# Patient Record
Sex: FEMALE | Race: WHITE | Employment: OTHER | RURAL
[De-identification: names, ages, dates, MRNs, and addresses within clinical notes are randomized per-mention and may not be internally consistent; named-entity substitution may affect disease eponyms.]

---

## 2017-01-01 ENCOUNTER — TELEPHONE (OUTPATIENT)
Dept: FAMILY MEDICINE CLINIC | Age: 72
End: 2017-01-01

## 2017-01-06 ENCOUNTER — OFFICE VISIT (OUTPATIENT)
Dept: FAMILY MEDICINE CLINIC | Age: 72
End: 2017-01-06

## 2017-01-06 VITALS
BODY MASS INDEX: 42.4 KG/M2 | DIASTOLIC BLOOD PRESSURE: 60 MMHG | OXYGEN SATURATION: 95 % | WEIGHT: 202 LBS | RESPIRATION RATE: 20 BRPM | SYSTOLIC BLOOD PRESSURE: 120 MMHG | HEIGHT: 58 IN | TEMPERATURE: 97.9 F | HEART RATE: 56 BPM

## 2017-01-06 DIAGNOSIS — J20.9 ACUTE BRONCHITIS, UNSPECIFIED ORGANISM: ICD-10-CM

## 2017-01-06 DIAGNOSIS — R73.02 GLUCOSE INTOLERANCE (IMPAIRED GLUCOSE TOLERANCE): ICD-10-CM

## 2017-01-06 DIAGNOSIS — J42 CHRONIC BRONCHITIS, UNSPECIFIED CHRONIC BRONCHITIS TYPE (HCC): Primary | ICD-10-CM

## 2017-01-06 DIAGNOSIS — I10 ESSENTIAL HYPERTENSION, BENIGN: ICD-10-CM

## 2017-01-06 DIAGNOSIS — E78.2 MIXED HYPERLIPIDEMIA: ICD-10-CM

## 2017-01-06 DIAGNOSIS — Z79.01 LONG TERM CURRENT USE OF ANTICOAGULANT: ICD-10-CM

## 2017-01-06 DIAGNOSIS — I25.10 ATHEROSCLEROSIS OF NATIVE CORONARY ARTERY OF NATIVE HEART WITHOUT ANGINA PECTORIS: ICD-10-CM

## 2017-01-06 DIAGNOSIS — I48.0 PAROXYSMAL ATRIAL FIBRILLATION (HCC): ICD-10-CM

## 2017-01-06 DIAGNOSIS — M54.50 ACUTE BILATERAL LOW BACK PAIN WITHOUT SCIATICA: ICD-10-CM

## 2017-01-06 RX ORDER — DOXYCYCLINE 100 MG/1
100 TABLET ORAL 2 TIMES DAILY
Qty: 20 TAB | Refills: 0 | Status: SHIPPED | OUTPATIENT
Start: 2017-01-06 | End: 2017-01-16

## 2017-01-06 RX ORDER — TRIAMCINOLONE ACETONIDE 40 MG/ML
40 INJECTION, SUSPENSION INTRA-ARTICULAR; INTRAMUSCULAR ONCE
Qty: 1 ML | Refills: 0
Start: 2017-01-06 | End: 2017-01-06

## 2017-01-06 NOTE — MR AVS SNAPSHOT
Visit Information Date & Time Provider Department Dept. Phone Encounter #  
 1/6/2017 10:30 AM ROLY Carl [facility] [address] [phone] [id number] Your Appointments 2/20/2017 11:00 AM  
ESTABLISHED PATIENT with Luis Manuel Rowland MD  
[address] [place] [facility]) Appt Note: 6 mo fu; 6 mth fu  $0   
 [address] [id number] [phone]  
  
   
 [address] [place] [address] [address] [address]  
  
    
 2/24/2017 10:00 AM  
Nurse Visit with Ahsley ([facility]) Appt Note: Labs for [employer] [address] [place] [address]  
[address] [facility] [address] [address] Upcoming Health Maintenance Date Due Hepatitis C Screening 1945 FOOT EXAM Q1 11/13/1956 DTaP/Tdap/Td series (1 - Tdap) 11/13/1967 FOBT Q 1 YEAR AGE 50-75 11/13/1996 EYE EXAM RETINAL OR DILATED Q1 12/9/2014 BREAST CANCER SCRN MAMMOGRAM 7/15/2015 GLAUCOMA SCREENING Q2Y 12/9/2015 MEDICARE YEARLY EXAM 12/16/2016 HEMOGLOBIN A1C Q6M 1/28/2017 LIPID PANEL Q1 7/22/2017 MICROALBUMIN Q1 9/13/2017 Allergies as of 1/6/2017  Review Complete On: 1/6/2017 By: ROLY Carl Severity Noted Reaction Type Reactions Tramadol Medium 07/29/2013    Hives, Rash Pcn [Penicillins]  10/05/2015    Rash, Nausea and Vomiting Shellfish Containing Products  08/17/2012    Shortness of Breath Statins-hmg-coa Reductase Inhibitors  01/27/2015   Side Effect Myalgia Current Immunizations  Reviewed on 4/15/2016 Name Date Influenza High Dose Vaccine PF 9/26/2016, 11/17/2015 Influenza Vaccine 11/8/2013  1:28 PM  
 Pneumococcal Conjugate (PCV-13) 12/20/2015 Pneumococcal Polysaccharide (PPSV-23) 11/8/2013  1:30 PM  
 Zoster Vaccine, Live 1/1/2010 Not reviewed this visit You Were Diagnosed With   
  
 Codes Comments Chronic bronchitis, unspecified chronic bronchitis type ([facility]    -  Primary ICD-10-CM: Y82 ICD-9-CM: 491.9 Long term current use of anticoagulant     ICD-10-CM: Z79.01 
ICD-9-CM: V58.61 Paroxysmal atrial fibrillation (HCC)     ICD-10-CM: I48.0 ICD-9-CM: 427.31 Atherosclerosis of native coronary artery of native heart without angina pectoris     ICD-10-CM: I25.10 ICD-9-CM: 414.01 Essential hypertension, benign     ICD-10-CM: I10 
ICD-9-CM: 401.1 Mixed hyperlipidemia     ICD-10-CM: E78.2 ICD-9-CM: 272.2 Glucose intolerance (impaired glucose tolerance)     ICD-10-CM: R73.02 
ICD-9-CM: 790.22 Acute bronchitis, unspecified organism     ICD-10-CM: J20.9 ICD-9-CM: 466.0 Acute bilateral low back pain without sciatica     ICD-10-CM: M54.5 ICD-9-CM: 724.2, 338.19 Vitals BP Pulse Temp Resp Height(growth percentile) Weight(growth percentile) 120/60 (BP 1 Location: Left arm, BP Patient Position: Sitting) (!) 56 97.9 °F (36.6 °C) (Oral) 20 4' 10.25\" (1.48 m) 202 lb (91.6 kg) SpO2 BMI OB Status Smoking Status 95% 41.86 kg/m2 Hysterectomy Former Smoker Vitals History BMI and BSA Data Body Mass Index Body Surface Area  
 41.86 kg/m 2 1.94 m 2 Preferred Pharmacy Pharmacy Name [facility] [address] [phone] Your Updated Medication List  
  
   
This list is accurate as of: 1/6/17 11:48 AM.  Always use your most recent med list.  
  
  
  
  
 arformoterol 15 mcg/2 mL Nebu neb solution Commonly known as:  Krishna Crease 15 mcg by Nebulization route two (2) times a day. aspirin 81 mg chewable tablet Take 81 mg by mouth daily. 4 Tablets Daily. atorvastatin 20 mg tablet Commonly known as:  LIPITOR  
TAKE 1 TABLET EVERY DAY Blood-Glucose Meter monitoring kit Commonly known as:  FREESTYLE LITE METER Dx E 11.5  
  
 budesonide 0.25 mg/2 mL Nbsp Commonly known as:  PULMICORT  
by Nebulization route two (2) times a day. bumetanide 1 mg tablet Commonly known as:  [place] TAKE 1 TABLET TWICE DAILY AND THEN MAY TAKE ADDITIONAL DOSE AS NEEDED FOR EDEMA  
  
 cholecalciferol 1,000 unit tablet Commonly known as:  VITAMIN D3 Take 1 Tab by mouth daily. COQ10  PO Take 100 mg by mouth daily. dilTIAZem  mg ER capsule Commonly known as:  CARDIZEM CD Take 1 Cap by mouth daily. doxycycline 100 mg tablet Commonly known as:  ADOXA Take 1 Tab by mouth two (2) times a day for 10 days. esomeprazole 20 mg capsule Commonly known as:  NexIUM Take 1 Cap by mouth daily. Indications: GASTROESOPHAGEAL REFLUX  
  
 famotidine 40 mg tablet Commonly known as:  PEPCID Take 1 Tab by mouth daily. Indications: GASTROESOPHAGEAL REFLUX  
  
 ferrous sulfate 325 mg (65 mg iron) tablet Take 1 Tab by mouth Daily (before breakfast). Indications: IRON DEFICIENCY ANEMIA  
  
 fluticasone-vilanterol 100-25 mcg/dose inhaler Commonly known as:  BREO ELLIPTA Take 1 Puff by inhalation daily. Indications: BRONCHOSPASM PREVENTION WITH COPD  
  
 glucose blood VI test strips strip Commonly known as:  ASCENSIA AUTODISC VI, ONE TOUCH ULTRA TEST VI Test 1 x per day * ipratropium-albuterol  mcg/actuation inhaler Commonly known as:  COMBIVENT One actuation QID * albuterol-ipratropium 2.5 mg-0.5 mg/3 ml Nebu Commonly known as:  DUO-NEB  
3 mL by Nebulization route every six (6) hours as needed. isosorbide mononitrate ER 30 mg tablet Commonly known as:  IMDUR  
TAKE 1 TABLET EVERY DAY  
  
 metoprolol succinate 100 mg tablet Commonly known as:  TOPROL-XL Take  by mouth daily. NITROSTAT 0.4 mg SL tablet Generic drug:  nitroglycerin 0.4 mg by SubLINGual route every five (5) minutes as needed. nystatin powder Commonly known as:  MYCOSTATIN Apply  to affected area three (3) times daily. OT Ultra/FastTrack Control Soln Generic drug:  Blood Glucose Control, Normal  
Dx E11.5 OXYGEN-AIR DELIVERY SYSTEMS  
2 L by Does Not Apply route. predniSONE 10 mg tablet Commonly known as:  DELTASONE  
3 tabs bid for 2 days, then 2 tabs bid for 4 days, then 1 tab bid for 4 days  
  
 theophylline ER(12 hour) 450 mg tablet Commonly known as:  Jenise Lackey Take 1 Tab by mouth daily. You need blood work for this medication * triamcinolone acetonide 0.1 % ointment Commonly known as:  KENALOG Apply  to affected area two (2) times a day. use thin layer * triamcinolone acetonide 40 mg/mL injection Commonly known as:  KENALOG  
1 mL by IntraMUSCular route once for 1 dose. warfarin 5 mg tablet Commonly known as:  COUMADIN Take 1.5 Tabs by mouth daily. TAKE ONE TABLET BY MOUTH ONCE DAILY TO  PREVENT  THROMBOEMBOLISM  WITH  CHRONIC  ATRIAL  FIBRILLATION  
  
 * Notice: This list has 4 medication(s) that are the same as other medications prescribed for you. Read the directions carefully, and ask your doctor or other care provider to review them with you. Prescriptions Sent to Pharmacy Refills  
 doxycycline (ADOXA) 100 mg tablet 0 Sig: Take 1 Tab by mouth two (2) times a day for 10 days. Class: Normal  
 Pharmacy: [facility] [address] [address] [address] Ph #: [phone] Route: Oral  
  
We Performed the Following TRIAMCINOLONE ACETONIDE INJ [ [facility]] To-Do List   
 02/27/2017 Lab:  CBC WITH AUTOMATED DIFF   
  
 02/27/2017 Lab:  HEMOGLOBIN A1C WITH EAG   
  
 02/27/2017 Lab:  LIPID PANEL WITH LDL/HDL RATIO   
  
 02/27/2017 Lab:  METABOLIC PANEL, COMPREHENSIVE Introducing hospitals & HEALTH SERVICES!    
 [facility] [employer] introduces User[employer]jo patient portal. Now you can access parts of your medical record, email your doctor's office, and request medication refills online. 1. In your internet browser, go to [email or web address] 2. Click on the First Time User? Click Here link in the Sign In box. You will see the New Member Sign Up page. 3. Enter your [employer] Access Code exactly as it appears below. You will not need to use this code after youve completed the sign-up process. If you do not sign up before the expiration date, you must request a new code. · [employer] Access Code: [email or web address] Expires: 4/6/2017 11:48 AM 
 
4. Enter the last four digits of your Social Security Number (xxxx) and Date of Birth (mm/dd/yyyy) as indicated and click Submit. You will be taken to the next sign-up page. 5. Create a [employer] ID. This will be your [employer] login ID and cannot be changed, so think of one that is secure and easy to remember. 6. Create a [employer] password. You can change your password at any time. 7. Enter your Password Reset Question and Answer. This can be used at a later time if you forget your password. 8. Enter your e-mail address. You will receive e-mail notification when new information is available in [address] 9. Click Sign Up. You can now view and download portions of your medical record. 10. Click the Download Summary menu link to download a portable copy of your medical information. If you have questions, please visit the Frequently Asked Questions section of the [employer] website. Remember, [employer] is NOT to be used for urgent needs. For medical emergencies, dial 911. Now available from your iPhone and Android! Please provide this summary of care documentation to your next provider. Your primary care clinician is listed as Mony Slade. If you have any questions after today's visit, please call [phone].

## 2017-01-06 NOTE — PROGRESS NOTES
[facility] [address]  [place], [address]   [phone]       HPI  Ms. Fabiola Plasencia is a 80 y.o. female presents today in routine follow up. Left hand- bruised- 2 weeks ago hit left hand-     Recent lab work obtained 11/2016 per Dr. Tashi Yousif- scanned into lab section. Back pain- acute on chronic pain over the past week. Pain bilateral, radiating to buttocks. Patient Active Problem List   Diagnosis Code    COPD (chronic obstructive pulmonary disease) ([facility] J44.9    GERD (gastroesophageal reflux disease) K21.9    Essential hypertension, benign I10    Hyperlipidemia E78.5    Gout M10.9    Osteoporosis M81.0    Anemia D64.9    Morbid obesity with BMI of 45.0-49.9, adult ([facility] E66.01, Z68.42    Coronary atherosclerosis of native coronary artery I25.10    Heart failure (HCC) I50.9    Paroxysmal atrial fibrillation (HCC) I48.0    Long term current use of anticoagulant Z79.01    Bronchitis J40    Pruritic dermatitis L29.9    CYRIL on CPAP G47.33    Dry skin dermatitis L85.3      Allergies   Allergen Reactions    Tramadol Hives and Rash    Pcn [Penicillins] Rash and Nausea and Vomiting    Shellfish Containing Products Shortness of Breath    Statins-Hmg-Coa Reductase Inhibitors Myalgia       Current Outpatient Prescriptions on File Prior to Visit   Medication Sig Dispense Refill    nystatin (MYCOSTATIN) powder Apply  to affected area three (3) times daily. 60 g 3    bumetanide (BUMEX) 1 mg tablet TAKE 1 TABLET TWICE DAILY AND THEN MAY TAKE ADDITIONAL DOSE AS NEEDED FOR EDEMA 200 Tab 1    atorvastatin (LIPITOR) 20 mg tablet TAKE 1 TABLET EVERY DAY 90 Tab 2    esomeprazole (NEXIUM) 20 mg capsule Take 1 Cap by mouth daily. Indications: GASTROESOPHAGEAL REFLUX 90 Cap 1    theophylline ER,12 hour, (THEOCHRON) 450 mg tablet Take 1 Tab by mouth daily.  You need blood work for this medication 90 Tab 1    metoprolol succinate (TOPROL-XL) 100 mg tablet Take  by mouth daily.  aspirin 81 mg chewable tablet Take 81 mg by mouth daily. 4 Tablets Daily.  diltiazem CD (CARDIZEM CD) 120 mg ER capsule Take 1 Cap by mouth daily. (Patient taking differently: Take 120 mg by mouth two (2) times a day. CARTIA XT) 90 Cap 1    warfarin (COUMADIN) 5 mg tablet Take 1.5 Tabs by mouth daily. TAKE ONE TABLET BY MOUTH ONCE DAILY TO  PREVENT  THROMBOEMBOLISM  WITH  CHRONIC  ATRIAL  FIBRILLATION 135 Tab 1    UBIDECARENONE/VITAMIN E MIXED (COQ10  PO) Take 100 mg by mouth daily.  arformoterol (BROVANA) 15 mcg/2 mL nebu neb solution 15 mcg by Nebulization route two (2) times a day.  budesonide (PULMICORT) 0.25 mg/2 mL nbsp by Nebulization route two (2) times a day.  isosorbide mononitrate ER (IMDUR) 30 mg tablet TAKE 1 TABLET EVERY DAY 90 Tab 3    ferrous sulfate 325 mg (65 mg iron) tablet Take 1 Tab by mouth Daily (before breakfast). Indications: IRON DEFICIENCY ANEMIA 90 Tab 1    famotidine (PEPCID) 40 mg tablet Take 1 Tab by mouth daily. Indications: GASTROESOPHAGEAL REFLUX 90 Tab 3    triamcinolone acetonide (KENALOG) 0.1 % ointment Apply  to affected area two (2) times a day. use thin layer 30 g 3    albuterol-ipratropium (DUO-NEB) 2.5 mg-0.5 mg/3 ml nebu 3 mL by Nebulization route every six (6) hours as needed. 90 mL 2    fluticasone-vilanterol (BREO ELLIPTA) 100-25 mcg/dose inhaler Take 1 Puff by inhalation daily. Indications: BRONCHOSPASM PREVENTION WITH COPD 3 Inhaler 1    Blood-Glucose Meter (FREESTYLE LITE METER) monitoring kit Dx E 11.5 1 Kit 0    glucose blood VI test strips (ASCENSIA AUTODISC VI, ONE TOUCH ULTRA TEST VI) strip Test 1 x per day 100 Strip 3    OT ULTRA/FASTTRACK CONTROL soln Dx E11.5 2 Bottle 4    OXYGEN-AIR DELIVERY SYSTEMS 2 L by Does Not Apply route.       ipratropium-albuterol (COMBIVENT)  mcg/actuation inhaler One actuation QID 3 Inhaler 3    cholecalciferol (VITAMIN D3) 1,000 unit tablet Take 1 Tab by mouth daily. (Patient taking differently: Take 2,000 Units by mouth daily. Indications: VITAMIN D DEFICIENCY) 30 Tab 7    nitroglycerin (NITROSTAT) 0.4 mg SL tablet 0.4 mg by SubLINGual route every five (5) minutes as needed.  predniSONE (DELTASONE) 10 mg tablet 3 tabs bid for 2 days, then 2 tabs bid for 4 days, then 1 tab bid for 4 days 36 Tab 0     No current facility-administered medications on file prior to visit. Lab Results   Component Value Date/Time    Hemoglobin A1c 5.7 07/22/2016 09:42 AM    Hemoglobin A1c 6.1 04/15/2016 01:44 PM    Hemoglobin A1c 8.0 01/20/2016 09:41 AM    Glucose 88 09/13/2016 02:35 PM    Microalb/Creat ratio (ug/mg creat.) 1176.1 09/13/2016 02:35 PM    LDL, calculated 88 07/22/2016 09:42 AM    Creatinine 1.38 09/13/2016 02:35 PM    Hemoglobin A1c, External 5.8 07/28/2016           Physical Exam   Cardiovascular: Normal heart sounds. Pulmonary/Chest: She has decreased breath sounds in the right lower field and the left lower field. She has wheezes in the left middle field. She has no rhonchi. She has no rales. Abdominal: Soft. There is no tenderness. Visit Vitals    /60 (BP 1 Location: Left arm, BP Patient Position: Sitting)    Pulse (!) 56    Temp 97.9 °F (36.6 °C) (Oral)    Resp 20    Ht 4' 10.25\" (1.48 m)    Wt 202 lb (91.6 kg)    SpO2 95%    BMI 41.86 kg/m2         Pérez Pang was seen today for follow-up. Diagnoses and all orders for this visit:    Chronic bronchitis, unspecified chronic bronchitis type ([address]    Long term current use of anticoagulant  -     CBC WITH AUTOMATED DIFF; Future    Paroxysmal atrial fibrillation (HCC)    Atherosclerosis of native coronary artery of native heart without angina pectoris    Essential hypertension, benign  -     METABOLIC PANEL, COMPREHENSIVE;  Future    Mixed hyperlipidemia  -     LIPID PANEL WITH LDL/HDL RATIO; Future    Glucose intolerance (impaired glucose tolerance)  -     HEMOGLOBIN A1C WITH EAG; Future    Acute bronchitis, unspecified organism  -     doxycycline (ADOXA) 100 mg tablet; Take 1 Tab by mouth two (2) times a day for 10 days. Acute bilateral low back pain without sciatica  -     TRIAMCINOLONE ACETONIDE INJ  -     triamcinolone acetonide (KENALOG) 40 mg/mL injection; 1 mL by IntraMUSCular route once for 1 dose. Plan     Continue with current regimen     Follow-up Disposition:  Return in about 2 months (around 3/6/2017) for for labs only.       Yumiko Mcadams PA-C, P

## 2017-01-10 RX ORDER — DILTIAZEM HYDROCHLORIDE 120 MG/1
CAPSULE, EXTENDED RELEASE ORAL
Qty: 90 CAP | Refills: 1 | Status: SHIPPED | OUTPATIENT
Start: 2017-01-10 | End: 2017-05-30 | Stop reason: SDUPTHER

## 2017-01-12 ENCOUNTER — TELEPHONE ANTICOAG (OUTPATIENT)
Dept: FAMILY MEDICINE CLINIC | Age: 72
End: 2017-01-12

## 2017-01-12 LAB — INR, EXTERNAL: 3.1

## 2017-01-19 ENCOUNTER — TELEPHONE ANTICOAG (OUTPATIENT)
Dept: FAMILY MEDICINE CLINIC | Age: 72
End: 2017-01-19

## 2017-01-19 ENCOUNTER — TELEPHONE (OUTPATIENT)
Dept: FAMILY MEDICINE CLINIC | Age: 72
End: 2017-01-19

## 2017-01-19 DIAGNOSIS — Z79.01 LONG TERM CURRENT USE OF ANTICOAGULANT: ICD-10-CM

## 2017-01-19 LAB — INR, EXTERNAL: 5

## 2017-01-19 RX ORDER — WARFARIN SODIUM 5 MG/1
TABLET ORAL
Qty: 135 TAB | Refills: 1
Start: 2017-01-19 | End: 2017-05-10 | Stop reason: SDUPTHER

## 2017-01-19 NOTE — PROGRESS NOTES
Hold Coumadin for 2 days presently we have her at 7.5 mg each day if that is the case reduce to 5 mg Monday Wednesday Friday and 7.5 mg rest of the week

## 2017-01-27 ENCOUNTER — TELEPHONE ANTICOAG (OUTPATIENT)
Dept: FAMILY MEDICINE CLINIC | Age: 72
End: 2017-01-27

## 2017-01-27 LAB — INR, EXTERNAL: 2.2

## 2017-01-27 NOTE — PROGRESS NOTES
Spoke with patient has been taking 1 & 1/2 tablets since last week so I advised her to stay on that. TAYO :  Please call patient wants to talk with you about stopping coumadin and starting something else.

## 2017-02-02 RX ORDER — THEOPHYLLINE 450 MG/1
TABLET, EXTENDED RELEASE ORAL
Qty: 90 TAB | Refills: 1 | Status: SHIPPED | OUTPATIENT
Start: 2017-02-02 | End: 2017-07-18 | Stop reason: SDUPTHER

## 2017-02-07 DIAGNOSIS — I50.9 CHRONIC CONGESTIVE HEART FAILURE, UNSPECIFIED CONGESTIVE HEART FAILURE TYPE: ICD-10-CM

## 2017-02-10 ENCOUNTER — TELEPHONE (OUTPATIENT)
Dept: FAMILY MEDICINE CLINIC | Age: 72
End: 2017-02-10

## 2017-02-10 RX ORDER — BUMETANIDE 1 MG/1
TABLET ORAL
Qty: 270 TAB | Refills: 1 | Status: SHIPPED | OUTPATIENT
Start: 2017-02-10 | End: 2017-06-09 | Stop reason: SDUPTHER

## 2017-02-13 DIAGNOSIS — K21.9 GASTROESOPHAGEAL REFLUX DISEASE WITHOUT ESOPHAGITIS: ICD-10-CM

## 2017-02-16 ENCOUNTER — TELEPHONE (OUTPATIENT)
Dept: FAMILY MEDICINE CLINIC | Age: 72
End: 2017-02-16

## 2017-02-16 ENCOUNTER — TELEPHONE ANTICOAG (OUTPATIENT)
Dept: FAMILY MEDICINE CLINIC | Age: 72
End: 2017-02-16

## 2017-02-16 ENCOUNTER — DOCUMENTATION ONLY (OUTPATIENT)
Dept: FAMILY MEDICINE CLINIC | Age: 72
End: 2017-02-16

## 2017-02-16 LAB — INR, EXTERNAL: 4.2

## 2017-02-16 NOTE — TELEPHONE ENCOUNTER
Rogerio García called with abnormal  INR results 4.2.   Prosper Andino she will route results back for Dr. Navarro Read review.

## 2017-02-16 NOTE — PROGRESS NOTES
Patient unknown to me. INR 4.2 taken at home and placed in my inbox. I contacted patient and found that she has been avoiding greens. Has been taking warfarin 7.5 mg daily for 6 months or longer. Reviewed INR history and discovered that this is the third time she has had an INR>4 in 6 weeks. Advised patient on phone today to hold warfarin tonight. Will resume warfarin 7.5 mg 6 days a week and to skip sundays and to have INR checked again in a week. Invited patient to make appt with me in March.       Zaki Major

## 2017-02-17 RX ORDER — FAMOTIDINE 40 MG/1
TABLET, FILM COATED ORAL
Qty: 90 TAB | Refills: 3 | Status: SHIPPED | OUTPATIENT
Start: 2017-02-17 | End: 2017-11-14 | Stop reason: SDUPTHER

## 2017-02-20 ENCOUNTER — OFFICE VISIT (OUTPATIENT)
Dept: CARDIOLOGY CLINIC | Age: 72
End: 2017-02-20

## 2017-02-20 VITALS
SYSTOLIC BLOOD PRESSURE: 124 MMHG | BODY MASS INDEX: 42.82 KG/M2 | HEIGHT: 58 IN | HEART RATE: 76 BPM | WEIGHT: 204 LBS | RESPIRATION RATE: 18 BRPM | OXYGEN SATURATION: 96 % | DIASTOLIC BLOOD PRESSURE: 76 MMHG

## 2017-02-20 DIAGNOSIS — G47.33 OSA ON CPAP: ICD-10-CM

## 2017-02-20 DIAGNOSIS — I48.0 PAROXYSMAL ATRIAL FIBRILLATION (HCC): Primary | ICD-10-CM

## 2017-02-20 DIAGNOSIS — I10 ESSENTIAL HYPERTENSION, BENIGN: ICD-10-CM

## 2017-02-20 DIAGNOSIS — Z99.89 OSA ON CPAP: ICD-10-CM

## 2017-02-20 DIAGNOSIS — E11.9 CONTROLLED TYPE 2 DIABETES MELLITUS WITHOUT COMPLICATION, UNSPECIFIED LONG TERM INSULIN USE STATUS: ICD-10-CM

## 2017-02-20 DIAGNOSIS — K21.9 GASTROESOPHAGEAL REFLUX DISEASE WITHOUT ESOPHAGITIS: ICD-10-CM

## 2017-02-20 DIAGNOSIS — J42 CHRONIC BRONCHITIS, UNSPECIFIED CHRONIC BRONCHITIS TYPE (HCC): ICD-10-CM

## 2017-02-20 DIAGNOSIS — E66.01 MORBID OBESITY WITH BMI OF 45.0-49.9, ADULT (HCC): ICD-10-CM

## 2017-02-20 DIAGNOSIS — I50.32 CHRONIC DIASTOLIC HEART FAILURE (HCC): ICD-10-CM

## 2017-02-20 DIAGNOSIS — I25.10 ATHEROSCLEROSIS OF NATIVE CORONARY ARTERY OF NATIVE HEART WITHOUT ANGINA PECTORIS: ICD-10-CM

## 2017-02-20 NOTE — PROGRESS NOTES
Jeannette Sorensen is a 80 y.o. female is here for routine f/u. Stable PHAM, occ palpitations. Some issues with the warfarin--may discuss NOAC options with PCP. The patient denies chest pain, orthopnea, PND, LE edema,  syncope, presyncope or fatigue.        Patient Active Problem List    Diagnosis Date Noted    Dry skin dermatitis 07/22/2016    CYRIL on CPAP 05/03/2016    Pruritic dermatitis 04/15/2016    Bronchitis 10/01/2015    Long term current use of anticoagulant 08/25/2015    Paroxysmal atrial fibrillation ([facility] 06/22/2015    Heart failure ([facility]     Coronary atherosclerosis of native coronary artery     Morbid obesity with BMI of 45.0-49.9, adult ([facility] 01/23/2015    GERD (gastroesophageal reflux disease)     Essential hypertension, benign     Hyperlipidemia     Gout     Osteoporosis     Anemia     COPD (chronic obstructive pulmonary disease) ([facility] 08/18/2012      ROLY Ballesteros  Past Medical History   Diagnosis Date    Adjustment disorder with depressed mood 2009    Allergic rhinitis due to other allergen 2011    Anemia, unspecified 2009    COPD     Coronary atherosclerosis of native coronary artery     Essential hypertension, benign 2008    GERD (gastroesophageal reflux disease)     Gout, unspecified 2008    Heart failure ([address]      hx of, asymptomic now    Impaired glucose tolerance test 2012    Morbid obesity with BMI of 45.0-49.9, adult ([facility] 1/23/2015    Osteoporosis, unspecified 2009    Other and unspecified hyperlipidemia 2008    Unspecified hereditary and idiopathic peripheral neuropathy 2010    Unspecified vitamin D deficiency 2009      Past Surgical History   Procedure Laterality Date    Hx appendectomy      Hx nohelia and bso      Hx colonoscopy      Hx ptca  2012     Unsuccessful attempt to opne RCA    Hx heart catheterization  2000    Hx heart catheterization  2012     LVEWDP 11, PCWP 11, Inf Hypo EF 45%, LAD 30%, mRCA 100% R to R and L to R collaterals    Hx coronary stent placement  2000     LCx     Allergies   Allergen Reactions    Tramadol Hives and Rash    Pcn [Penicillins] Rash and Nausea and Vomiting    Shellfish Containing Products Shortness of Breath    Statins-Hmg-Coa Reductase Inhibitors Myalgia      Family History   Problem Relation Age of Onset    Arthritis-osteo Mother     Diabetes Mother     Heart Disease Mother     Lung Disease Mother     Hypertension Sister     Asthma Sister     Asthma Brother     Heart Disease Maternal Grandmother     No Known Problems Brother     No Known Problems Sister     Kidney Disease Daughter     Lupus Daughter       Social History     Social History    Marital status:      Spouse name: N/A    Number of children: N/A    Years of education: N/A     Occupational History    Not on file. Social History Main Topics    Smoking status: Former Smoker     Packs/day: 1.00     Years: 40.00     Quit date: 12/1/2013    Smokeless tobacco: Not on file    Alcohol use No    Drug use: No    Sexual activity: Not on file     Other Topics Concern    Not on file     Social History Narrative      Current Outpatient Prescriptions   Medication Sig    famotidine (PEPCID) 40 mg tablet TAKE 1 TAB BY MOUTH DAILY FOR GASTROESOPHAGEAL REFLUX    bumetanide (BUMEX) 1 mg tablet TAKE 1 TABLET TWICE DAILY AND THEN MAY TAKE ADDITIONAL DOSE AS NEEDED FOR EDEMA    theophylline ER,12 hour, (THEOCHRON) 450 mg tablet TAKE 1 TALETB BY MOUTH DAILY. YOU NEED BLOOD WORK FOR THIS MEDICATION    warfarin (COUMADIN) 5 mg tablet 5 mg Monday Wednesday Friday 7.5 mg rest the week    CARTIA  mg ER capsule TAKE 1 CAPSULE EVERY DAY    nystatin (MYCOSTATIN) powder Apply  to affected area three (3) times daily.  atorvastatin (LIPITOR) 20 mg tablet TAKE 1 TABLET EVERY DAY    esomeprazole (NEXIUM) 20 mg capsule Take 1 Cap by mouth daily.  Indications: GASTROESOPHAGEAL REFLUX    metoprolol succinate (TOPROL-XL) 100 mg tablet Take  by mouth daily.  aspirin 81 mg chewable tablet Take 81 mg by mouth daily. 4 Tablets Daily.  UBIDECARENONE/VITAMIN E MIXED (COQ10  PO) Take 100 mg by mouth daily.  arformoterol (BROVANA) 15 mcg/2 mL nebu neb solution 15 mcg by Nebulization route two (2) times a day.  budesonide (PULMICORT) 0.25 mg/2 mL nbsp by Nebulization route two (2) times a day.  isosorbide mononitrate ER (IMDUR) 30 mg tablet TAKE 1 TABLET EVERY DAY    ferrous sulfate 325 mg (65 mg iron) tablet Take 1 Tab by mouth Daily (before breakfast). Indications: IRON DEFICIENCY ANEMIA    triamcinolone acetonide (KENALOG) 0.1 % ointment Apply  to affected area two (2) times a day. use thin layer    albuterol-ipratropium (DUO-NEB) 2.5 mg-0.5 mg/3 ml nebu 3 mL by Nebulization route every six (6) hours as needed.  fluticasone-vilanterol (BREO ELLIPTA) 100-25 mcg/dose inhaler Take 1 Puff by inhalation daily. Indications: BRONCHOSPASM PREVENTION WITH COPD    Blood-Glucose Meter (FREESTYLE LITE METER) monitoring kit Dx E 11.5    glucose blood VI test strips (ASCENSIA AUTODISC VI, ONE TOUCH ULTRA TEST VI) strip Test 1 x per day    OT ULTRA/FASTTRACK CONTROL soln Dx E11.5    OXYGEN-AIR DELIVERY SYSTEMS 2 L by Does Not Apply route.  ipratropium-albuterol (COMBIVENT)  mcg/actuation inhaler One actuation QID    cholecalciferol (VITAMIN D3) 1,000 unit tablet Take 1 Tab by mouth daily. (Patient taking differently: Take 2,000 Units by mouth daily. Indications: VITAMIN D DEFICIENCY)    nitroglycerin (NITROSTAT) 0.4 mg SL tablet 0.4 mg by SubLINGual route every five (5) minutes as needed. No current facility-administered medications for this visit. Review of Symptoms:    CONST  No weight change. No fever, chills, sweats    ENT No visual changes, URI sx, sore throat    CV  See HPI   RESP  No cough, or sputum, wheezing. Also see HPI   GI  No abdominal pain or change in bowel habits.   No heartburn or dysphagia. No melena or rectal bleeding.   No dysuria, urgency, frequency, hematuria   MSKEL  No joint pain, swelling. No muscle pain. SKIN  No rash or lesions. NEURO  No headache, syncope, or seizure. No weakness, loss of sensation, or paresthesias. PSYCH  No low mood or depression  No anxiety. HE/LYMPH  No easy bruising, abnormal bleeding, or enlarged glands.         Physical ExamPhysical Exam:    Visit Vitals    /76 (BP 1 Location: Left arm, BP Patient Position: Sitting)    Pulse 76    Resp 18    Ht 4' 10.25\" (1.48 m)    Wt 204 lb (92.5 kg)    SpO2 96%    BMI 42.27 kg/m2     Gen: NAD  HEENT:  PERRL, throat clear  Neck: no mass or thyromegaly, no JVD   Heart:  sl irregular,Nl S1S2,  no murmur, gallop or rub.   Lungs:  clear  Abdomen:   Soft, non-tender, bowel sounds are active.   Extremities:  No edema  Pulse: symmetric  Neuro: A&O times 3, WNL      Cardiographics    ECG: NSR rate variation/PAC's, NST    CARDIAC TESTING    Lexiscan MPI, 10/2014: Normal perfusion, EF 68%    Echo 7/15/15--normal LV size, LVEF 60-65, mild AS (32.13/16.89), mod LAE, MAC, trace MR, inadequate TR for PASP    Echo 7/27/16--mild LVH, LVEF 60-65, D1, mod LAE, mild AS (p18/m10.8), mod MAC, mild MR, mild MS, RVSP 46.      Labs:   Lab Results   Component Value Date/Time    Sodium 144 09/13/2016 02:35 PM    Sodium 142 08/02/2016 01:50 PM    Sodium 140 07/22/2016 09:42 AM    Sodium 144 04/15/2016 01:44 PM    Sodium 142 01/20/2016 09:41 AM    Potassium 5.2 09/13/2016 02:35 PM    Potassium 5.1 08/02/2016 01:50 PM    Potassium 5.7 07/22/2016 09:42 AM    Potassium 4.3 04/15/2016 01:44 PM    Potassium 3.9 01/20/2016 09:41 AM    Chloride 102 09/13/2016 02:35 PM    Chloride 101 08/02/2016 01:50 PM    Chloride 101 07/22/2016 09:42 AM    Chloride 99 04/15/2016 01:44 PM    Chloride 98 01/20/2016 09:41 AM    CO2 25 09/13/2016 02:35 PM    CO2 25 08/02/2016 01:50 PM    CO2 20 07/22/2016 09:42 AM    CO2 28 04/15/2016 01:44 PM    CO2 29 01/20/2016 09:41 AM    Anion gap 7 08/18/2012 04:00 AM    Glucose 88 09/13/2016 02:35 PM    Glucose 101 08/02/2016 01:50 PM    Glucose 130 07/22/2016 09:42 AM    Glucose 153 04/15/2016 01:44 PM    Glucose 156 01/20/2016 09:41 AM    BUN 52 09/13/2016 02:35 PM    BUN 43 08/02/2016 01:50 PM    BUN 59 07/22/2016 09:42 AM    BUN 22 04/15/2016 01:44 PM    BUN 31 01/20/2016 09:41 AM    Creatinine 1.38 09/13/2016 02:35 PM    Creatinine 1.19 08/02/2016 01:50 PM    Creatinine 1.58 07/22/2016 09:42 AM    Creatinine 1.34 04/15/2016 01:44 PM    Creatinine 1.11 01/20/2016 09:41 AM    BUN/Creatinine ratio 38 09/13/2016 02:35 PM    BUN/Creatinine ratio 36 08/02/2016 01:50 PM    BUN/Creatinine ratio 37 07/22/2016 09:42 AM    BUN/Creatinine ratio 16 04/15/2016 01:44 PM    BUN/Creatinine ratio 28 01/20/2016 09:41 AM    GFR est AA 45 09/13/2016 02:35 PM    GFR est AA 54 08/02/2016 01:50 PM    GFR est AA 38 07/22/2016 09:42 AM    GFR est AA 47 04/15/2016 01:44 PM    GFR est AA 59 01/20/2016 09:41 AM    GFR est non-AA 39 09/13/2016 02:35 PM    GFR est non-AA 47 08/02/2016 01:50 PM    GFR est non-AA 33 07/22/2016 09:42 AM    GFR est non-AA 40 04/15/2016 01:44 PM    GFR est non-AA 51 01/20/2016 09:41 AM    Calcium 10.2 09/13/2016 02:35 PM    Calcium 9.4 08/02/2016 01:50 PM    Calcium 9.5 07/22/2016 09:42 AM    Calcium 10.0 04/15/2016 01:44 PM    Calcium 9.3 01/20/2016 09:41 AM    Bilirubin, total <0.2 08/02/2016 01:50 PM    Bilirubin, total <0.2 07/22/2016 09:42 AM    Bilirubin, total 0.2 04/15/2016 01:44 PM    Bilirubin, total 0.3 01/20/2016 09:41 AM    Bilirubin, total 0.4 12/16/2015 10:17 AM    AST (SGOT) 10 08/02/2016 01:50 PM    AST (SGOT) 13 07/22/2016 09:42 AM    AST (SGOT) 9 04/15/2016 01:44 PM    AST (SGOT) 10 01/20/2016 09:41 AM    AST (SGOT) 9 12/16/2015 10:17 AM    Alk. phosphatase 117 08/02/2016 01:50 PM    Alk. phosphatase 121 07/22/2016 09:42 AM    Alk. phosphatase 114 04/15/2016 01:44 PM    Alk.  phosphatase 111 01/20/2016 09:41 AM    Alk. phosphatase 122 12/16/2015 10:17 AM    Protein, total 6.3 08/02/2016 01:50 PM    Protein, total 7.0 07/22/2016 09:42 AM    Protein, total 6.5 04/15/2016 01:44 PM    Protein, total 6.1 01/20/2016 09:41 AM    Protein, total 6.4 12/16/2015 10:17 AM    Albumin 4.2 09/13/2016 02:35 PM    Albumin 3.9 08/02/2016 01:50 PM    Albumin 4.1 07/22/2016 09:42 AM    Albumin 3.8 04/15/2016 01:44 PM    Albumin 3.6 01/20/2016 09:41 AM    A-G Ratio 1.6 08/02/2016 01:50 PM    A-G Ratio 1.4 07/22/2016 09:42 AM    A-G Ratio 1.4 04/15/2016 01:44 PM    A-G Ratio 1.4 01/20/2016 09:41 AM    A-G Ratio 1.8 12/16/2015 10:17 AM    ALT (SGPT) 13 08/02/2016 01:50 PM    ALT (SGPT) 11 07/22/2016 09:42 AM    ALT (SGPT) 10 04/15/2016 01:44 PM    ALT (SGPT) 9 01/20/2016 09:41 AM    ALT (SGPT) 8 12/16/2015 10:17 AM     No results found for: CPK, CPKX, CPX  Lab Results   Component Value Date/Time    Cholesterol, total 195 07/22/2016 09:42 AM    Cholesterol, total 220 12/16/2015 10:17 AM    Cholesterol, total 227 06/15/2015 10:51 AM    Cholesterol, total 313 01/23/2015 10:56 AM    Cholesterol, total 287 10/10/2014 08:11 AM    HDL Cholesterol 51 07/22/2016 09:42 AM    HDL Cholesterol 59 12/16/2015 10:17 AM    HDL Cholesterol 58 06/15/2015 10:51 AM    HDL Cholesterol 58 01/23/2015 10:56 AM    HDL Cholesterol 53 10/10/2014 08:11 AM    LDL, calculated 88 07/22/2016 09:42 AM    LDL, calculated 111 12/16/2015 10:17 AM    LDL, calculated 120 06/15/2015 10:51 AM    LDL, calculated 209 01/23/2015 10:56 AM    LDL, calculated 170 10/10/2014 08:11 AM    Triglyceride 280 07/22/2016 09:42 AM    Triglyceride 249 12/16/2015 10:17 AM    Triglyceride 243 06/15/2015 10:51 AM    Triglyceride 232 01/23/2015 10:56 AM    Triglyceride 321 10/10/2014 08:11 AM     No results found for this or any previous visit.     Assessment:         Patient Active Problem List    Diagnosis Date Noted    Dry skin dermatitis 07/22/2016    CYRIL on CPAP 05/03/2016    Pruritic dermatitis 04/15/2016    Bronchitis 10/01/2015    Long term current use of anticoagulant 08/25/2015    Paroxysmal atrial fibrillation (HCC) 06/22/2015    Heart failure (HCC)     Coronary atherosclerosis of native coronary artery     Morbid obesity with BMI of 45.0-49.9, adult ([facility] 01/23/2015    GERD (gastroesophageal reflux disease)     Essential hypertension, benign     Hyperlipidemia     Gout     Osteoporosis     Anemia     COPD (chronic obstructive pulmonary disease) (HCC) 08/18/2012      Stable PHAM, occ palpitations. Some issues with the warfarin--may discuss NOAC options with PCP. Plan:     Doing well with no adverse cardiac symptoms. Lipids and labs followed by PCP. Continue current care and f/u in 6 months.     Adrian Chapa MD

## 2017-02-20 NOTE — PROGRESS NOTES
Verified patient with two patient identifiers. Medications reviewed/approved by Dr. Ankita Fong. Verbal from Dr. Ankita Fong to remove the medications that were deleted during the visit.

## 2017-02-20 NOTE — MR AVS SNAPSHOT
Visit Information Date & Time Provider Department Dept. Phone Encounter #  
 2/20/2017 11:00 AM Rm Siegel, [address] [facility] [phone] [id number] Follow-up Instructions Return in about 6 months (around 8/20/2017). Follow-up and Disposition History Your Appointments 2/24/2017 10:00 AM  
[facility] Appt Note: Labs for [employer] [address] [address]  
[address] [facility] [address] [address] Upcoming Health Maintenance Date Due Hepatitis C Screening 1945 FOOT EXAM Q1 11/13/1956 DTaP/Tdap/Td series (1 - Tdap) 11/13/1967 FOBT Q 1 YEAR AGE 50-75 11/13/1996 EYE EXAM RETINAL OR DILATED Q1 12/9/2014 BREAST CANCER SCRN MAMMOGRAM 7/15/2015 GLAUCOMA SCREENING Q2Y 12/9/2015 MEDICARE YEARLY EXAM 12/16/2016 HEMOGLOBIN A1C Q6M 1/28/2017 LIPID PANEL Q1 7/22/2017 MICROALBUMIN Q1 9/13/2017 Allergies as of 2/20/2017  Review Complete On: 2/20/2017 By: Rm Siegel MD  
  
 Severity Noted Reaction Type Reactions Tramadol Medium 07/29/2013    Hives, Rash Pcn [Penicillins]  10/05/2015    Rash, Nausea and Vomiting Shellfish Containing Products  08/17/2012    Shortness of Breath Statins-hmg-coa Reductase Inhibitors  01/27/2015   Side Effect Myalgia Current Immunizations  Reviewed on 4/15/2016 Name Date Influenza High Dose Vaccine PF 9/26/2016, 11/17/2015 Influenza Vaccine 11/8/2013  1:28 PM  
 Pneumococcal Conjugate (PCV-13) 12/20/2015 Pneumococcal Polysaccharide (PPSV-23) 11/8/2013  1:30 PM  
 Zoster Vaccine, Live 1/1/2010 Not reviewed this visit You Were Diagnosed With   
  
 Codes Comments Paroxysmal atrial fibrillation (HCC)    -  Primary ICD-10-CM: I48.0 ICD-9-CM: 427.31   
 Chronic bronchitis, unspecified chronic bronchitis type ([facility]     ICD-10-CM: B84 ICD-9-CM: 491.9 Chronic diastolic heart failure (HCC)     ICD-10-CM: I50.32 
ICD-9-CM: 428.32 Essential hypertension, benign     ICD-10-CM: I10 
ICD-9-CM: 401.1 Atherosclerosis of native coronary artery of native heart without angina pectoris     ICD-10-CM: I25.10 ICD-9-CM: 414.01   
 YCRIL on CPAP     ICD-10-CM: G47.33 
ICD-9-CM: 327.23 Controlled type 2 diabetes mellitus without complication, unspecified long term insulin use status ([facility]     ICD-10-CM: E11.9 ICD-9-CM: 250.00 Gastroesophageal reflux disease without esophagitis     ICD-10-CM: K21.9 ICD-9-CM: 530.81 Morbid obesity with BMI of 45.0-49.9, adult (HCC)     ICD-10-CM: E66.01, Z68.42 
ICD-9-CM: 278.01, V85.42 Vitals BP Pulse Resp Height(growth percentile) Weight(growth percentile) SpO2  
 124/76 (BP 1 Location: Left arm, BP Patient Position: Sitting) 76 18 4' 10.25\" (1.48 m) 204 lb (92.5 kg) 96% BMI OB Status Smoking Status 42.27 kg/m2 Hysterectomy Former Smoker Vitals History BMI and BSA Data Body Mass Index Body Surface Area  
 42.27 kg/m 2 1.95 m 2 Preferred Pharmacy Pharmacy Name Phone [address] [phone] Your Updated Medication List  
  
   
This list is accurate as of: 2/20/17 11:53 AM.  Always use your most recent med list.  
  
  
  
  
 arformoterol 15 mcg/2 mL Nebu neb solution Commonly known as:  Andrade Meager 15 mcg by Nebulization route two (2) times a day. aspirin 81 mg chewable tablet Take 81 mg by mouth daily. 4 Tablets Daily. atorvastatin 20 mg tablet Commonly known as:  LIPITOR  
TAKE 1 TABLET EVERY DAY Blood-Glucose Meter monitoring kit Commonly known as:  FREESTYLE LITE METER Dx E 11.5  
  
 budesonide 0.25 mg/2 mL Nbsp Commonly known as:  PULMICORT  
 by Nebulization route two (2) times a day. bumetanide 1 mg tablet Commonly known as:  Elina Harms TAKE 1 TABLET TWICE DAILY AND THEN MAY TAKE ADDITIONAL DOSE AS NEEDED FOR EDEMA CARTIA  mg ER capsule Generic drug:  dilTIAZem CD  
TAKE 1 CAPSULE EVERY DAY  
  
 cholecalciferol 1,000 unit tablet Commonly known as:  VITAMIN D3 Take 1 Tab by mouth daily. COQ10  PO Take 100 mg by mouth daily. esomeprazole 20 mg capsule Commonly known as:  NexIUM Take 1 Cap by mouth daily. Indications: GASTROESOPHAGEAL REFLUX  
  
 famotidine 40 mg tablet Commonly known as:  PEPCID  
TAKE 1 TAB BY MOUTH DAILY FOR GASTROESOPHAGEAL REFLUX  
  
 ferrous sulfate 325 mg (65 mg iron) tablet Take 1 Tab by mouth Daily (before breakfast). Indications: IRON DEFICIENCY ANEMIA  
  
 fluticasone-vilanterol 100-25 mcg/dose inhaler Commonly known as:  BREO ELLIPTA Take 1 Puff by inhalation daily. Indications: BRONCHOSPASM PREVENTION WITH COPD  
  
 glucose blood VI test strips strip Commonly known as:  ASCENSIA AUTODISC VI, ONE TOUCH ULTRA TEST VI Test 1 x per day * ipratropium-albuterol  mcg/actuation inhaler Commonly known as:  COMBIVENT One actuation QID * albuterol-ipratropium 2.5 mg-0.5 mg/3 ml Nebu Commonly known as:  DUO-NEB  
3 mL by Nebulization route every six (6) hours as needed. isosorbide mononitrate ER 30 mg tablet Commonly known as:  IMDUR  
TAKE 1 TABLET EVERY DAY  
  
 metoprolol succinate 100 mg tablet Commonly known as:  TOPROL-XL Take  by mouth daily. NITROSTAT 0.4 mg SL tablet Generic drug:  nitroglycerin 0.4 mg by SubLINGual route every five (5) minutes as needed. nystatin powder Commonly known as:  MYCOSTATIN Apply  to affected area three (3) times daily. OT Ultra/FastTrack Control Soln Generic drug:  Blood Glucose Control, Normal  
Dx E11.5 OXYGEN-AIR DELIVERY SYSTEMS  
2 L by Does Not Apply route. theophylline ER(12 hour) 450 mg tablet Commonly known as:  THEOCHRON  
TAKE 1 TALETB BY MOUTH DAILY. YOU NEED BLOOD WORK FOR THIS MEDICATION  
  
 triamcinolone acetonide 0.1 % ointment Commonly known as:  KENALOG Apply  to affected area two (2) times a day. use thin layer  
  
 warfarin 5 mg tablet Commonly known as:  COUMADIN  
5 mg Monday Wednesday Friday 7.5 mg rest the week * Notice: This list has 2 medication(s) that are the same as other medications prescribed for you. Read the directions carefully, and ask your doctor or other care provider to review them with you. We Performed the Following AMB POC EKG ROUTINE W/ 12 LEADS, INTER & REP [87190 CPT(R)] Follow-up Instructions Return in about 6 months (around 8/20/2017). Introducing [facility]! [facility] introduces Tasspass patient portal. Now you can access parts of your medical record, email your doctor's office, and request medication refills online. 1. In your internet browser, go to [email or web address] 2. Click on the First Time User? Click Here link in the Sign In box. You will see the New Member Sign Up page. 3. Enter your Tasspass Access Code exactly as it appears below. You will not need to use this code after youve completed the sign-up process. If you do not sign up before the expiration date, you must request a new code. · Tasspass Access Code: [email or web address] Expires: 4/6/2017 11:48 AM 
 
4. Enter the last four digits of your Social Security Number (xxxx) and Date of Birth (mm/dd/yyyy) as indicated and click Submit. You will be taken to the next sign-up page. 5. Create a Zygat ID. This will be your Tasspass login ID and cannot be changed, so think of one that is secure and easy to remember. 6. Create a Tasspass password. You can change your password at any time. 7. Enter your Password Reset Question and Answer.  This can be used at a later time if you forget your password. 8. Enter your e-mail address. You will receive e-mail notification when new information is available in [address] 9. Click Sign Up. You can now view and download portions of your medical record. 10. Click the Download Summary menu link to download a portable copy of your medical information. If you have questions, please visit the Frequently Asked Questions section of the Piktochart website. Remember, Piktochart is NOT to be used for urgent needs. For medical emergencies, dial 911. Now available from your iPhone and Android! Please provide this summary of care documentation to your next provider. Your primary care clinician is listed as Omkar Mario. If you have any questions after today's visit, please call [phone].

## 2017-02-24 ENCOUNTER — CLINICAL SUPPORT (OUTPATIENT)
Dept: FAMILY MEDICINE CLINIC | Age: 72
End: 2017-02-24

## 2017-02-24 DIAGNOSIS — I10 ESSENTIAL HYPERTENSION, BENIGN: ICD-10-CM

## 2017-02-24 DIAGNOSIS — E78.2 MIXED HYPERLIPIDEMIA: ICD-10-CM

## 2017-02-24 DIAGNOSIS — Z79.01 LONG TERM CURRENT USE OF ANTICOAGULANT: ICD-10-CM

## 2017-02-24 DIAGNOSIS — R73.02 GLUCOSE INTOLERANCE (IMPAIRED GLUCOSE TOLERANCE): ICD-10-CM

## 2017-02-24 NOTE — MR AVS SNAPSHOT
Visit Information Date & Time Provider Department Dept. Phone Encounter #  
 2/24/2017 10:00 AM [facility] [address] [facility] [phone] [id number] Your Appointments 9/1/2017 11:00 AM  
ESTABLISHED PATIENT with Atiya Batres MD  
[address] E[facility] - [facility] Appt Note: 6 mth fu  $0 cp  
 [address] [id number] [phone]  
  
   
 [address] [place] [address] [address] [address] Upcoming Health Maintenance Date Due Hepatitis C Screening 1945 FOOT EXAM Q1 11/13/1956 DTaP/Tdap/Td series (1 - Tdap) 11/13/1967 FOBT Q 1 YEAR AGE 50-75 11/13/1996 EYE EXAM RETINAL OR DILATED Q1 12/9/2014 BREAST CANCER SCRN MAMMOGRAM 7/15/2015 GLAUCOMA SCREENING Q2Y 12/9/2015 MEDICARE YEARLY EXAM 12/16/2016 HEMOGLOBIN A1C Q6M 1/28/2017 LIPID PANEL Q1 7/22/2017 MICROALBUMIN Q1 9/13/2017 Allergies as of 2/24/2017  Review Complete On: 2/20/2017 By: Atiya Batres MD  
  
 Severity Noted Reaction Type Reactions Tramadol Medium 07/29/2013    Hives, Rash Pcn [Penicillins]  10/05/2015    Rash, Nausea and Vomiting Shellfish Containing Products  08/17/2012    Shortness of Breath Statins-hmg-coa Reductase Inhibitors  01/27/2015   Side Effect Myalgia Current Immunizations  Reviewed on 4/15/2016 Name Date Influenza High Dose Vaccine PF 9/26/2016, 11/17/2015 Influenza Vaccine 11/8/2013  1:28 PM  
 Pneumococcal Conjugate (PCV-13) 12/20/2015 Pneumococcal Polysaccharide (PPSV-23) 11/8/2013  1:30 PM  
 Zoster Vaccine, Live 1/1/2010 Not reviewed this visit You Were Diagnosed With   
  
 Codes Comments Long term current use of anticoagulant     ICD-10-CM: Z79.01 
ICD-9-CM: V58.61 Essential hypertension, benign     ICD-10-CM: I10 
ICD-9-CM: 401.1  Glucose intolerance (impaired glucose tolerance)     ICD-10-CM: R73.02 
 ICD-9-CM: 790.22 Mixed hyperlipidemia     ICD-10-CM: E78.2 ICD-9-CM: 272.2 Vitals OB Status Hysterectomy Preferred Pharmacy Pharmacy Name Phone [facility] [place] - [address] [facility] [address] [phone] Your Updated Medication List  
  
   
This list is accurate as of: 2/24/17 10:17 AM.  Always use your most recent med list.  
  
  
  
  
 arformoterol 15 mcg/2 mL Nebu neb solution Commonly known as:  Arleta Trenton 15 mcg by Nebulization route two (2) times a day. aspirin 81 mg chewable tablet Take 81 mg by mouth daily. 4 Tablets Daily. atorvastatin 20 mg tablet Commonly known as:  LIPITOR  
TAKE 1 TABLET EVERY DAY Blood-Glucose Meter monitoring kit Commonly known as:  FREESTYLE LITE METER Dx E 11.5  
  
 budesonide 0.25 mg/2 mL Nbsp Commonly known as:  PULMICORT  
by Nebulization route two (2) times a day. bumetanide 1 mg tablet Commonly known as:  Royal Hutching TAKE 1 TABLET TWICE DAILY AND THEN MAY TAKE ADDITIONAL DOSE AS NEEDED FOR EDEMA CARTIA  mg ER capsule Generic drug:  dilTIAZem CD  
TAKE 1 CAPSULE EVERY DAY  
  
 cholecalciferol 1,000 unit tablet Commonly known as:  VITAMIN D3 Take 1 Tab by mouth daily. COQ10  PO Take 100 mg by mouth daily. esomeprazole 20 mg capsule Commonly known as:  NexIUM Take 1 Cap by mouth daily. Indications: GASTROESOPHAGEAL REFLUX  
  
 famotidine 40 mg tablet Commonly known as:  PEPCID  
TAKE 1 TAB BY MOUTH DAILY FOR GASTROESOPHAGEAL REFLUX  
  
 ferrous sulfate 325 mg (65 mg iron) tablet Take 1 Tab by mouth Daily (before breakfast). Indications: IRON DEFICIENCY ANEMIA  
  
 fluticasone-vilanterol 100-25 mcg/dose inhaler Commonly known as:  BREO ELLIPTA Take 1 Puff by inhalation daily. Indications: BRONCHOSPASM PREVENTION WITH COPD  
  
 glucose blood VI test strips strip Commonly known as:  ASCENSIA AUTODISC VI, ONE TOUCH ULTRA TEST VI Test 1 x per day * ipratropium-albuterol  mcg/actuation inhaler Commonly known as:  COMBIVENT One actuation QID * albuterol-ipratropium 2.5 mg-0.5 mg/3 ml Nebu Commonly known as:  DUO-NEB  
3 mL by Nebulization route every six (6) hours as needed. isosorbide mononitrate ER 30 mg tablet Commonly known as:  IMDUR  
TAKE 1 TABLET EVERY DAY  
  
 metoprolol succinate 100 mg tablet Commonly known as:  TOPROL-XL Take  by mouth daily. NITROSTAT 0.4 mg SL tablet Generic drug:  nitroglycerin 0.4 mg by SubLINGual route every five (5) minutes as needed. nystatin powder Commonly known as:  MYCOSTATIN Apply  to affected area three (3) times daily. OT Ultra/FastTrack Control Soln Generic drug:  Blood Glucose Control, Normal  
Dx E11.5 OXYGEN-AIR DELIVERY SYSTEMS  
2 L by Does Not Apply route. theophylline ER(12 hour) 450 mg tablet Commonly known as:  THEOCHRON  
TAKE 1 TALETB BY MOUTH DAILY. YOU NEED BLOOD WORK FOR THIS MEDICATION  
  
 triamcinolone acetonide 0.1 % ointment Commonly known as:  KENALOG Apply  to affected area two (2) times a day. use thin layer  
  
 warfarin 5 mg tablet Commonly known as:  COUMADIN  
5 mg Monday Wednesday Friday 7.5 mg rest the week * Notice: This list has 2 medication(s) that are the same as other medications prescribed for you. Read the directions carefully, and ask your doctor or other care provider to review them with you. We Performed the Following CBC WITH AUTOMATED DIFF [76004 CPT(R)] HEMOGLOBIN A1C WITH EAG [95127 CPT(R)] LIPID PANEL WITH LDL/HDL RATIO [30102 CPT(R)] METABOLIC PANEL, COMPREHENSIVE [90878 CPT(R)] WI COLLECTION VENOUS BLOOD,VENIPUNCTURE [id number] CPT(R)] Introducing [facility] & HEALTH SERVICES!    
 [facility] introduces [employer] patient portal. Now you can access parts of your medical record, email your doctor's office, and request medication refills online. 1. In your internet browser, go to [email or web address] 2. Click on the First Time User? Click Here link in the Sign In box. You will see the New Member Sign Up page. 3. Enter your [facility] Access Code exactly as it appears below. You will not need to use this code after youve completed the sign-up process. If you do not sign up before the expiration date, you must request a new code. · [facility] Access Code: [email or web address] Expires: 4/6/2017 11:48 AM 
 
4. Enter the last four digits of your Social Security Number (xxxx) and Date of Birth (mm/dd/yyyy) as indicated and click Submit. You will be taken to the next sign-up page. 5. Create a [facility] ID. This will be your [facility] login ID and cannot be changed, so think of one that is secure and easy to remember. 6. Create a [facility] password. You can change your password at any time. 7. Enter your Password Reset Question and Answer. This can be used at a later time if you forget your password. 8. Enter your e-mail address. You will receive e-mail notification when new information is available in [address] 9. Click Sign Up. You can now view and download portions of your medical record. 10. Click the Download Summary menu link to download a portable copy of your medical information. If you have questions, please visit the Frequently Asked Questions section of the [facility] website. Remember, [facility] is NOT to be used for urgent needs. For medical emergencies, dial 911. Now available from your iPhone and Android! Please provide this summary of care documentation to your next provider. Your primary care clinician is listed as Robinsonena Angry. If you have any questions after today's visit, please call [phone].

## 2017-02-25 LAB
ALBUMIN SERPL-MCNC: 4.1 G/DL (ref 3.5–4.8)
ALBUMIN/GLOB SERPL: 1.5 {RATIO} (ref 1.1–2.5)
ALP SERPL-CCNC: 121 IU/L (ref 39–117)
ALT SERPL-CCNC: 11 IU/L (ref 0–32)
AST SERPL-CCNC: 9 IU/L (ref 0–40)
BASOPHILS # BLD AUTO: 0 X10E3/UL (ref 0–0.2)
BASOPHILS NFR BLD AUTO: 0 %
BILIRUB SERPL-MCNC: 0.2 MG/DL (ref 0–1.2)
BUN SERPL-MCNC: 43 MG/DL (ref 8–27)
BUN/CREAT SERPL: 35 (ref 11–26)
CALCIUM SERPL-MCNC: 9.8 MG/DL (ref 8.7–10.3)
CHLORIDE SERPL-SCNC: 97 MMOL/L (ref 96–106)
CHOLEST SERPL-MCNC: 236 MG/DL (ref 100–199)
CO2 SERPL-SCNC: 23 MMOL/L (ref 18–29)
CREAT SERPL-MCNC: 1.24 MG/DL (ref 0.57–1)
EOSINOPHIL # BLD AUTO: 0.2 X10E3/UL (ref 0–0.4)
EOSINOPHIL NFR BLD AUTO: 2 %
ERYTHROCYTE [DISTWIDTH] IN BLOOD BY AUTOMATED COUNT: 16.5 % (ref 12.3–15.4)
EST. AVERAGE GLUCOSE BLD GHB EST-MCNC: 137 MG/DL
GLOBULIN SER CALC-MCNC: 2.7 G/DL (ref 1.5–4.5)
GLUCOSE SERPL-MCNC: 135 MG/DL (ref 65–99)
HBA1C MFR BLD: 6.4 % (ref 4.8–5.6)
HCT VFR BLD AUTO: 32.6 % (ref 34–46.6)
HDLC SERPL-MCNC: 56 MG/DL
HGB BLD-MCNC: 10.5 G/DL (ref 11.1–15.9)
IMM GRANULOCYTES # BLD: 0.1 X10E3/UL (ref 0–0.1)
IMM GRANULOCYTES NFR BLD: 1 %
INTERPRETATION: NORMAL
LDLC SERPL CALC-MCNC: 126 MG/DL (ref 0–99)
LDLC/HDLC SERPL: 2.3 RATIO UNITS (ref 0–3.2)
LYMPHOCYTES # BLD AUTO: 1.8 X10E3/UL (ref 0.7–3.1)
LYMPHOCYTES NFR BLD AUTO: 18 %
MCH RBC QN AUTO: 27.2 PG (ref 26.6–33)
MCHC RBC AUTO-ENTMCNC: 32.2 G/DL (ref 31.5–35.7)
MCV RBC AUTO: 85 FL (ref 79–97)
MONOCYTES # BLD AUTO: 0.6 X10E3/UL (ref 0.1–0.9)
MONOCYTES NFR BLD AUTO: 6 %
NEUTROPHILS # BLD AUTO: 7.5 X10E3/UL (ref 1.4–7)
NEUTROPHILS NFR BLD AUTO: 73 %
PLATELET # BLD AUTO: 270 X10E3/UL (ref 150–379)
POTASSIUM SERPL-SCNC: 4.2 MMOL/L (ref 3.5–5.2)
PROT SERPL-MCNC: 6.8 G/DL (ref 6–8.5)
RBC # BLD AUTO: 3.86 X10E6/UL (ref 3.77–5.28)
SODIUM SERPL-SCNC: 141 MMOL/L (ref 134–144)
TRIGL SERPL-MCNC: 272 MG/DL (ref 0–149)
VLDLC SERPL CALC-MCNC: 54 MG/DL (ref 5–40)
WBC # BLD AUTO: 10.2 X10E3/UL (ref 3.4–10.8)

## 2017-02-27 ENCOUNTER — TELEPHONE (OUTPATIENT)
Dept: FAMILY MEDICINE CLINIC | Age: 72
End: 2017-02-27

## 2017-02-27 NOTE — TELEPHONE ENCOUNTER
Patient needs an OV for a P.T. and INR either this Thursday or Friday.   Where do you want to work her in at?

## 2017-02-27 NOTE — TELEPHONE ENCOUNTER
Message routed to MEME Castanon to see if she wants to take care of this or to forward the message to Minal Marion to schedule for Friday.

## 2017-03-03 ENCOUNTER — OFFICE VISIT (OUTPATIENT)
Dept: FAMILY MEDICINE CLINIC | Age: 72
End: 2017-03-03

## 2017-03-03 VITALS
BODY MASS INDEX: 43.12 KG/M2 | WEIGHT: 205.4 LBS | SYSTOLIC BLOOD PRESSURE: 156 MMHG | TEMPERATURE: 97.2 F | DIASTOLIC BLOOD PRESSURE: 90 MMHG | HEIGHT: 58 IN | OXYGEN SATURATION: 96 % | HEART RATE: 70 BPM | RESPIRATION RATE: 20 BRPM

## 2017-03-03 DIAGNOSIS — F41.1 GENERALIZED ANXIETY DISORDER: ICD-10-CM

## 2017-03-03 DIAGNOSIS — L29.9 PRURITIC DERMATITIS: ICD-10-CM

## 2017-03-03 DIAGNOSIS — I48.0 PAROXYSMAL ATRIAL FIBRILLATION (HCC): Primary | ICD-10-CM

## 2017-03-03 LAB
INR BLD: 2.6
PT POC: 31.3 SEC
VALID INTERNAL CONTROL?: YES

## 2017-03-03 RX ORDER — TRIAMCINOLONE ACETONIDE 1 MG/G
OINTMENT TOPICAL 2 TIMES DAILY
Qty: 30 G | Refills: 3 | Status: SHIPPED | OUTPATIENT
Start: 2017-03-03 | End: 2018-01-01 | Stop reason: SDUPTHER

## 2017-03-03 RX ORDER — ALPRAZOLAM 0.5 MG/1
0.5 TABLET ORAL
Qty: 45 TAB | Refills: 1 | Status: SHIPPED | OUTPATIENT
Start: 2017-03-03 | End: 2018-01-01

## 2017-03-03 NOTE — MR AVS SNAPSHOT
Visit Information Date & Time Provider Department Dept. Phone Encounter #  
 3/3/2017  2:30 PM Vanessa Merida, [address] [phone] [id number] Follow-up Instructions Return in about 2 months (around 5/3/2017) for annual wellness . Your Appointments 9/1/2017 11:00 AM  
ESTABLISHED PATIENT with Luis Manuel Rowland MD  
[address] Parrish - [facility]) Appt Note: 6 mth fu  $0 cp  
 [address] [facility] [address] [address] [phone]  
  
   
 [address] [address] Upcoming Health Maintenance Date Due Hepatitis C Screening 1945 FOOT EXAM Q1 11/13/1956 DTaP/Tdap/Td series (1 - Tdap) 11/13/1967 FOBT Q 1 YEAR AGE 50-75 11/13/1996 EYE EXAM RETINAL OR DILATED Q1 12/9/2014 BREAST CANCER SCRN MAMMOGRAM 7/15/2015 GLAUCOMA SCREENING Q2Y 12/9/2015 MEDICARE YEARLY EXAM 12/16/2016 HEMOGLOBIN A1C Q6M 8/24/2017 MICROALBUMIN Q1 9/13/2017 LIPID PANEL Q1 2/24/2018 Allergies as of 3/3/2017  Review Complete On: 3/3/2017 By: ROLY Carl Severity Noted Reaction Type Reactions Tramadol Medium 07/29/2013    Hives, Rash Pcn [Penicillins]  10/05/2015    Rash, Nausea and Vomiting Shellfish Containing Products  08/17/2012    Shortness of Breath Statins-hmg-coa Reductase Inhibitors  01/27/2015   Side Effect Myalgia Current Immunizations  Reviewed on 4/15/2016 Name Date Influenza High Dose Vaccine PF 9/26/2016, 11/17/2015 Influenza Vaccine 11/8/2013  1:28 PM  
 Pneumococcal Conjugate (PCV-13) 12/20/2015 Pneumococcal Polysaccharide (PPSV-23) 11/8/2013  1:30 PM  
 Zoster Vaccine, Live 1/1/2010 Not reviewed this visit You Were Diagnosed With   
  
 Codes Comments Paroxysmal atrial fibrillation (HCC)    -  Primary ICD-10-CM: I48.0 ICD-9-CM: 427.31 Pruritic dermatitis     ICD-10-CM: L29.9 ICD-9-CM: 698.9 Generalized anxiety disorder     ICD-10-CM: F41.1 ICD-9-CM: 300.02 Vitals BP Pulse Temp Resp Height(growth percentile) 156/90 (BP 1 Location: Right arm, BP Patient Position: Sitting) 70 97.2 °F (36.2 °C) (Temporal) 20 4' 10.25\" (1.48 m) Weight(growth percentile) SpO2 BMI OB Status Smoking Status 205 lb 6.4 oz (93.2 kg) 96% 42.56 kg/m2 Hysterectomy Former Smoker Vitals History BMI and BSA Data Body Mass Index Body Surface Area 42.56 kg/m 2 1.96 m 2 Preferred Pharmacy Pharmacy Name Phone [facility] [place] [facility], [place] - [address] [phone] Your Updated Medication List  
  
   
This list is accurate as of: 3/3/17  3:49 PM.  Always use your most recent med list.  
  
  
  
  
 ALPRAZolam 0.5 mg tablet Commonly known as:  Rogerio January Take 1 Tab by mouth two (2) times daily as needed for Anxiety or Sleep. Max Daily Amount: 1 mg. Indications: ANXIETY  
  
 arformoterol 15 mcg/2 mL Nebu neb solution Commonly known as:  [place] 15 mcg by Nebulization route two (2) times a day. aspirin 81 mg chewable tablet Take 81 mg by mouth daily. 4 Tablets Daily. atorvastatin 20 mg tablet Commonly known as:  LIPITOR  
TAKE 1 TABLET EVERY DAY Blood-Glucose Meter monitoring kit Commonly known as:  FREESTYLE LITE METER Dx E 11.5  
  
 budesonide 0.25 mg/2 mL Nbsp Commonly known as:  PULMICORT  
by Nebulization route two (2) times a day. bumetanide 1 mg tablet Commonly known as:  Lacho Parag TAKE 1 TABLET TWICE DAILY AND THEN MAY TAKE ADDITIONAL DOSE AS NEEDED FOR EDEMA CARTIA  mg ER capsule Generic drug:  dilTIAZem CD  
TAKE 1 CAPSULE EVERY DAY  
  
 cholecalciferol 1,000 unit tablet Commonly known as:  VITAMIN D3 Take 1 Tab by mouth daily. COQ10  PO Take 100 mg by mouth daily. esomeprazole 20 mg capsule Commonly known as:  NexIUM Take 1 Cap by mouth daily. Indications: GASTROESOPHAGEAL REFLUX  
  
 famotidine 40 mg tablet Commonly known as:  PEPCID  
TAKE 1 TAB BY MOUTH DAILY FOR GASTROESOPHAGEAL REFLUX  
  
 ferrous sulfate 325 mg (65 mg iron) tablet Take 1 Tab by mouth Daily (before breakfast). Indications: IRON DEFICIENCY ANEMIA  
  
 fluticasone-vilanterol 100-25 mcg/dose inhaler Commonly known as:  BREO ELLIPTA Take 1 Puff by inhalation daily. Indications: BRONCHOSPASM PREVENTION WITH COPD  
  
 glucose blood VI test strips strip Commonly known as:  ASCENSIA AUTODISC VI, ONE TOUCH ULTRA TEST VI Test 1 x per day * ipratropium-albuterol  mcg/actuation inhaler Commonly known as:  COMBIVENT One actuation QID * albuterol-ipratropium 2.5 mg-0.5 mg/3 ml Nebu Commonly known as:  DUO-NEB  
3 mL by Nebulization route every six (6) hours as needed. isosorbide mononitrate ER 30 mg tablet Commonly known as:  IMDUR  
TAKE 1 TABLET EVERY DAY  
  
 metoprolol succinate 100 mg tablet Commonly known as:  TOPROL-XL Take  by mouth daily. NITROSTAT 0.4 mg SL tablet Generic drug:  nitroglycerin 0.4 mg by SubLINGual route every five (5) minutes as needed. nystatin powder Commonly known as:  MYCOSTATIN Apply  to affected area three (3) times daily. OT Ultra/FastTrack Control Soln Generic drug:  Blood Glucose Control, Normal  
Dx E11.5 OXYGEN-AIR DELIVERY SYSTEMS Take 2 L by inhalation continuous. theophylline ER(12 hour) 450 mg tablet Commonly known as:  THEOCHRON  
TAKE 1 TALETB BY MOUTH DAILY. YOU NEED BLOOD WORK FOR THIS MEDICATION  
  
 triamcinolone acetonide 0.1 % ointment Commonly known as:  KENALOG Apply  to affected area two (2) times a day. use thin layer  
  
 warfarin 5 mg tablet Commonly known as:  COUMADIN  
5 mg Monday Wednesday Friday 7.5 mg rest the week * Notice:   This list has 2 medication(s) that are the same as other medications prescribed for you. Read the directions carefully, and ask your doctor or other care provider to review them with you. Prescriptions Printed Refills ALPRAZolam (XANAX) 0.5 mg tablet 1 Sig: Take 1 Tab by mouth two (2) times daily as needed for Anxiety or Sleep. Max Daily Amount: 1 mg. Indications: ANXIETY Class: Print Route: Oral  
  
Prescriptions Sent to Pharmacy Refills  
 triamcinolone acetonide (KENALOG) 0.1 % ointment 3 Sig: Apply  to affected area two (2) times a day. use thin layer Class: Normal  
 Pharmacy: [address] [facility] [address] Ph #: [phone] Route: Topical  
  
March 2017 Details Mazin [place] Tue Wed Thu Fri Sat  
     1  
  
  
  
   2  
  
  
  
   3  
2.6  
5 mg See details 4  
  
7.5 mg  
  
  
  5  
  
7.5 mg  
  
   6  
  
5 mg  
  
   7  
  
7.5 mg  
  
   8  
  
5 mg  
  
   9  
  
7.5 mg  
  
   10  
  
5 mg  
  
   11  
  
7.5 mg  
  
  
  12  
  
7.5 mg  
  
   13  
  
5 mg  
  
   14  
  
7.5 mg  
  
   15  
  
5 mg  
  
   16  
  
7.5 mg  
  
   17  
  
5 mg  
  
   18  
  
7.5 mg  
  
  
  19  
  
7.5 mg  
  
   20  
  
5 mg  
  
   21  
  
7.5 mg  
  
   22  
  
5 mg  
  
   23  
  
7.5 mg  
  
   24  
  
5 mg  
  
   25  
  
  
  
  
  26  
  
  
  
   27  
  
  
  
   28  
  
  
  
   29  
  
  
  
   30  
  
  
  
   31  
  
  
  
   
 Date Details 03/03 This INR check INR: 2.6 Date of next INR:  3/24/2017 How to take your warfarin dose To take:  5 mg Take 1 of the 5 mg tablets. To take:  7.5 mg Take 1.5 of the 5 mg tablets. We Performed the Following AMB POC PT/INR [32957 CPT(R)] COLLECTION CAPILLARY BLOOD SPECIMEN [28136 CPT(R)] Follow-up Instructions Return in about 2 months (around 5/3/2017) for annual wellness . Introducing hospitals & HEALTH SERVICES!    
 Anibal Mccollum introduces [facility] patient portal. Now you can access parts of your medical record, email your doctor's office, and request medication refills online. 1. In your internet browser, go to [email or web address] 2. Click on the First Time User? Click Here link in the Sign In box. You will see the New Member Sign Up page. 3. Enter your [employer] Access Code exactly as it appears below. You will not need to use this code after youve completed the sign-up process. If you do not sign up before the expiration date, you must request a new code. · [employer] Access Code: [email or web address] Expires: 4/6/2017 11:48 AM 
 
4. Enter the last four digits of your Social Security Number (xxxx) and Date of Birth (mm/dd/yyyy) as indicated and click Submit. You will be taken to the next sign-up page. 5. Create a [employer] ID. This will be your [employer] login ID and cannot be changed, so think of one that is secure and easy to remember. 6. Create a [employer] password. You can change your password at any time. 7. Enter your Password Reset Question and Answer. This can be used at a later time if you forget your password. 8. Enter your e-mail address. You will receive e-mail notification when new information is available in [address] 9. Click Sign Up. You can now view and download portions of your medical record. 10. Click the Download Summary menu link to download a portable copy of your medical information. If you have questions, please visit the Frequently Asked Questions section of the [employer] website. Remember, [employer] is NOT to be used for urgent needs. For medical emergencies, dial 911. Now available from your iPhone and Android! Please provide this summary of care documentation to your next provider. Your primary care clinician is listed as Darin Franks. If you have any questions after today's visit, please call [phone].

## 2017-03-03 NOTE — PROGRESS NOTES
Anemia has improved. Continue with iron supplementation  Kidney function is diminished but stable  Blood sugar is well controlled  Cholesterol, LDL levels have increased. Minimize saturated fats.

## 2017-03-03 NOTE — PROGRESS NOTES
Results for orders placed or performed in visit on 03/03/17   AMB POC PT/INR   Result Value Ref Range    VALID INTERNAL CONTROL POC Yes     Prothrombin time (POC) 31.3 sec    INR POC 2.6      Doc Casarez is a 80 y.o. female who presents today for Anticoagulation monitoring. Indication: Atrial Fibrillation  INR Goal: 2.0-3.0. Current dose:  Coumadin 5 mg M W F and 7.5 mg all other days  Missed Coumadin Doses:  None   Medication Changes:  no  Dietary Changes:  no    Symptoms: taking coumadin appropriately without any bleeding. Latest INRs:  Lab Results   Component Value Date/Time    INR, External 4.2 02/16/2017    INR, External 2.2 01/27/2017    INR, External 5.0 01/19/2017    INR POC 2.6 03/03/2017 03:02 PM        New Coumadin dose:.current treatment plan is effective, no change in therapy. Next check to be scheduled for 1 week home check.

## 2017-03-03 NOTE — PROGRESS NOTES
[address], [facility]   [phone]     3/3/2017  Chief Complaint   Patient presents with    Anticoagulation       HPI  Ms. Silvino Kramer is a 80 y.o. female     Needs refill of anxiety medication and cream for rash       Patient Active Problem List   Diagnosis Code    COPD (chronic obstructive pulmonary disease) ([facility] J44.9    GERD (gastroesophageal reflux disease) K21.9    Essential hypertension, benign I10    Hyperlipidemia E78.5    Gout M10.9    Osteoporosis M81.0    Anemia D64.9    Morbid obesity with BMI of 45.0-49.9, adult ([facility] E66.01, Z68.42    Coronary atherosclerosis of native coronary artery I25.10    Heart failure (HCC) I50.9    Paroxysmal atrial fibrillation (HCC) I48.0    Long term current use of anticoagulant Z79.01    Bronchitis J40    Pruritic dermatitis L29.9    CYRIL on CPAP G47.33    Dry skin dermatitis L85.3      Allergies   Allergen Reactions    Tramadol Hives and Rash    Pcn [Penicillins] Rash and Nausea and Vomiting    Shellfish Containing Products Shortness of Breath    Statins-Hmg-Coa Reductase Inhibitors Myalgia       Current Outpatient Prescriptions on File Prior to Visit   Medication Sig Dispense Refill    famotidine (PEPCID) 40 mg tablet TAKE 1 TAB BY MOUTH DAILY FOR GASTROESOPHAGEAL REFLUX 90 Tab 3    bumetanide (BUMEX) 1 mg tablet TAKE 1 TABLET TWICE DAILY AND THEN MAY TAKE ADDITIONAL DOSE AS NEEDED FOR EDEMA 270 Tab 1    theophylline ER,12 hour, (THEOCHRON) 450 mg tablet TAKE 1 TALETB BY MOUTH DAILY. YOU NEED BLOOD WORK FOR THIS MEDICATION 90 Tab 1    warfarin (COUMADIN) 5 mg tablet 5 mg Monday Wednesday Friday 7.5 mg rest the week 135 Tab 1    CARTIA  mg ER capsule TAKE 1 CAPSULE EVERY DAY 90 Cap 1    nystatin (MYCOSTATIN) powder Apply  to affected area three (3) times daily.  60 g 3    atorvastatin (LIPITOR) 20 mg tablet TAKE 1 TABLET EVERY DAY 90 Tab 2    esomeprazole (NEXIUM) 20 mg capsule Take 1 Cap by mouth daily. Indications: GASTROESOPHAGEAL REFLUX 90 Cap 1    metoprolol succinate (TOPROL-XL) 100 mg tablet Take  by mouth daily.  aspirin 81 mg chewable tablet Take 81 mg by mouth daily. 4 Tablets Daily.  UBIDECARENONE/VITAMIN E MIXED (COQ10  PO) Take 100 mg by mouth daily.  arformoterol (BROVANA) 15 mcg/2 mL nebu neb solution 15 mcg by Nebulization route two (2) times a day.  budesonide (PULMICORT) 0.25 mg/2 mL nbsp by Nebulization route two (2) times a day.  isosorbide mononitrate ER (IMDUR) 30 mg tablet TAKE 1 TABLET EVERY DAY 90 Tab 3    ferrous sulfate 325 mg (65 mg iron) tablet Take 1 Tab by mouth Daily (before breakfast). Indications: IRON DEFICIENCY ANEMIA 90 Tab 1    albuterol-ipratropium (DUO-NEB) 2.5 mg-0.5 mg/3 ml nebu 3 mL by Nebulization route every six (6) hours as needed. 90 mL 2    fluticasone-vilanterol (BREO ELLIPTA) 100-25 mcg/dose inhaler Take 1 Puff by inhalation daily. Indications: BRONCHOSPASM PREVENTION WITH COPD 3 Inhaler 1    Blood-Glucose Meter (FREESTYLE LITE METER) monitoring kit Dx E 11.5 1 Kit 0    glucose blood VI test strips (ASCENSIA AUTODISC VI, ONE TOUCH ULTRA TEST VI) strip Test 1 x per day 100 Strip 3    OT ULTRA/FASTTRACK CONTROL soln Dx E11.5 2 Bottle 4    OXYGEN-AIR DELIVERY SYSTEMS Take 2 L by inhalation continuous.  ipratropium-albuterol (COMBIVENT)  mcg/actuation inhaler One actuation QID 3 Inhaler 3    cholecalciferol (VITAMIN D3) 1,000 unit tablet Take 1 Tab by mouth daily. (Patient taking differently: Take 2,000 Units by mouth daily. Indications: VITAMIN D DEFICIENCY) 30 Tab 7    nitroglycerin (NITROSTAT) 0.4 mg SL tablet 0.4 mg by SubLINGual route every five (5) minutes as needed. No current facility-administered medications on file prior to visit. Physical Exam   Vitals reviewed.     Visit Vitals    /90 (BP 1 Location: Right arm, BP Patient Position: Sitting)    Pulse 70    Temp 97.2 °F (36.2 °C) (Temporal)    Resp 20    Ht 4' 10.25\" (1.48 m)    Wt 205 lb 6.4 oz (93.2 kg)    SpO2 96%    BMI 42.56 kg/m2         Maegan Ansarier was seen today for anticoagulation. Diagnoses and all orders for this visit:    Paroxysmal atrial fibrillation (HCC)  -     AMB POC PT/INR  -     COLLECTION CAPILLARY BLOOD SPECIMEN    Pruritic dermatitis  -     triamcinolone acetonide (KENALOG) 0.1 % ointment; Apply  to affected area two (2) times a day. use thin layer    Generalized anxiety disorder  -     ALPRAZolam (XANAX) 0.5 mg tablet; Take 1 Tab by mouth two (2) times daily as needed for Anxiety or Sleep. Max Daily Amount: 1 mg. Indications: ANXIETY      Plan         Follow-up Disposition:  Return in about 2 months (around 5/3/2017) for annual wellness .       Layne Benson PA-C, P

## 2017-03-24 ENCOUNTER — TELEPHONE ANTICOAG (OUTPATIENT)
Dept: FAMILY MEDICINE CLINIC | Age: 72
End: 2017-03-24

## 2017-03-24 LAB — INR, EXTERNAL: 2.4

## 2017-04-03 ENCOUNTER — TELEPHONE (OUTPATIENT)
Dept: FAMILY MEDICINE CLINIC | Age: 72
End: 2017-04-03

## 2017-04-04 ENCOUNTER — TELEPHONE (OUTPATIENT)
Dept: FAMILY MEDICINE CLINIC | Age: 72
End: 2017-04-04

## 2017-04-04 ENCOUNTER — OFFICE VISIT (OUTPATIENT)
Dept: FAMILY MEDICINE CLINIC | Age: 72
End: 2017-04-04

## 2017-04-04 VITALS
BODY MASS INDEX: 43.03 KG/M2 | HEIGHT: 58 IN | HEART RATE: 76 BPM | TEMPERATURE: 96.7 F | OXYGEN SATURATION: 92 % | WEIGHT: 205 LBS | RESPIRATION RATE: 22 BRPM | SYSTOLIC BLOOD PRESSURE: 140 MMHG | DIASTOLIC BLOOD PRESSURE: 70 MMHG

## 2017-04-04 DIAGNOSIS — K21.9 GASTROESOPHAGEAL REFLUX DISEASE WITHOUT ESOPHAGITIS: ICD-10-CM

## 2017-04-04 DIAGNOSIS — M10.371 ACUTE GOUT DUE TO RENAL IMPAIRMENT INVOLVING RIGHT FOOT: Primary | ICD-10-CM

## 2017-04-04 RX ORDER — PREDNISONE 20 MG/1
40 TABLET ORAL
Qty: 6 TAB | Refills: 0 | Status: SHIPPED | OUTPATIENT
Start: 2017-04-04 | End: 2017-04-21 | Stop reason: SDUPTHER

## 2017-04-04 RX ORDER — HYDROGEN PEROXIDE 3 %
20 SOLUTION, NON-ORAL MISCELLANEOUS DAILY
Qty: 90 CAP | Refills: 1 | Status: SHIPPED | OUTPATIENT
Start: 2017-04-04 | End: 2018-01-01 | Stop reason: SDUPTHER

## 2017-04-04 NOTE — TELEPHONE ENCOUNTER
Xiao Pérez called for clarification on prednisone script written today. Spoke to Dr. Oliver Stark and he stated take 2 tabs daily for 3 days.

## 2017-04-04 NOTE — PROGRESS NOTES
Luda Potts is a 80 y.o. female presenting for/with:    Gout      HPI:  Symptoms include acute swelling, warmth and pain to the R foot 1st TMT. Started 4d ago, gradually worsening. Taking APAP, helps a little. Feels like the last time she had gout. Evaluation to date: none. Treatment to date: rest, APAP. Number of flareups in past year:0  Current treatment: none  Lab Results   Component Value Date/Time    Uric acid 5.4 12/09/2013 09:41 AM     PMH, , Medications/Allergies: reviewed, on chart. ROS:  Constitutional: No fever, chills or weight loss  Respiratory: No cough, SOB   CV: No chest pain or Palpitations    Visit Vitals    /70 (BP 1 Location: Left arm, BP Patient Position: Sitting)    Pulse 76    Temp 96.7 °F (35.9 °C) (Temporal)    Resp 22    Ht 4' 10.25\" (1.48 m)    Wt 205 lb (93 kg)    SpO2 92%    BMI 42.48 kg/m2     Wt Readings from Last 3 Encounters:   04/04/17 205 lb (93 kg)   03/03/17 205 lb 6.4 oz (93.2 kg)   02/20/17 204 lb (92.5 kg)     Physical Examination: General appearance - alert, well appearing, and in no distress  Mental status - alert, oriented to person, place, and time  Extremities - peripheral pulses normal, 2+ pedal edema with moderate varicose vv bilat, no clubbing or cyanosis. R foot with intense warmtha and mod erythema to the instep medially, markedly ttp    A/p:  Acute gout. tx with prednisone 40mg every day x3 days. Check uric acid level, consider r/s allopurinol. F/U with Porsche Singh in a couple weeks for INR.

## 2017-04-04 NOTE — PATIENT INSTRUCTIONS

## 2017-04-04 NOTE — MR AVS SNAPSHOT
Visit Information Date & Time Provider Department Dept. Phone Encounter #  
 4/4/2017 10:50 AM MD VELMA Zapata [facility] [address] [facility] [phone] [id number] Follow-up Instructions Return in about 3 weeks (around 4/25/2017). Follow-up and Disposition History Your Appointments 4/21/2017 11:00 AM  
Medicare Physical with ROLY Boland 4064 ([facility]) Appt Note: Last: none listed [address] [address] [phone]  
  
   
 [facility] [address] [id number]  
  
    
 9/1/2017 11:00 AM  
ESTABLISHED PATIENT with Nataliya Lovett MD  
[address] [facility] Appt Note: 6 mth fu  $0 cp  
 [address] [id number] [phone]  
  
   
 [address] [place] [address] Upcoming Health Maintenance Date Due Hepatitis C Screening 1945 FOOT EXAM Q1 11/13/1956 DTaP/Tdap/Td series (1 - Tdap) 11/13/1967 FOBT Q 1 YEAR AGE 50-75 11/13/1996 EYE EXAM RETINAL OR DILATED Q1 12/9/2014 BREAST CANCER SCRN MAMMOGRAM 7/15/2015 GLAUCOMA SCREENING Q2Y 12/9/2015 MEDICARE YEARLY EXAM 12/16/2016 HEMOGLOBIN A1C Q6M 8/24/2017 MICROALBUMIN Q1 9/13/2017 LIPID PANEL Q1 2/24/2018 Allergies as of 4/4/2017  Review Complete On: 4/4/2017 By: Mila Brooks MD  
  
 Severity Noted Reaction Type Reactions Tramadol Medium 07/29/2013    Hives, Rash Pcn [Penicillins]  10/05/2015    Rash, Nausea and Vomiting Shellfish Containing Products  08/17/2012    Shortness of Breath Statins-hmg-coa Reductase Inhibitors  01/27/2015   Side Effect Myalgia Current Immunizations  Reviewed on 4/15/2016 Name Date Influenza High Dose Vaccine PF 9/26/2016, 11/17/2015 Influenza Vaccine 11/8/2013  1:28 PM  
 Pneumococcal Conjugate (PCV-13) 12/20/2015 Pneumococcal Polysaccharide (PPSV-23) 11/8/2013  1:30 PM  
 Zoster Vaccine, Live 1/1/2010 Not reviewed this visit You Were Diagnosed With   
  
 Codes Comments Acute gout due to renal impairment involving right foot    -  Primary ICD-10-CM: M10.371 ICD-9-CM: 274.10 Gastroesophageal reflux disease without esophagitis     ICD-10-CM: K21.9 ICD-9-CM: 530.81 Vitals BP Pulse Temp Resp Height(growth percentile) 140/70 (BP 1 Location: Left arm, BP Patient Position: Sitting) 76 96.7 °F (35.9 °C) (Temporal) 22 4' 10.25\" (1.48 m) Weight(growth percentile) SpO2 BMI OB Status Smoking Status 205 lb (93 kg) 92% 42.48 kg/m2 Hysterectomy Former Smoker BMI and BSA Data Body Mass Index Body Surface Area  
 42.48 kg/m 2 1.96 m 2 Preferred Pharmacy Pharmacy Name Phone [facility] [place] [facility] [place] [address] [phone] Your Updated Medication List  
  
   
This list is accurate as of: 4/4/17 12:17 PM.  Always use your most recent med list.  
  
  
  
  
 ALPRAZolam 0.5 mg tablet Commonly known as:  Shavon Oh Take 1 Tab by mouth two (2) times daily as needed for Anxiety or Sleep. Max Daily Amount: 1 mg. Indications: ANXIETY  
  
 arformoterol 15 mcg/2 mL Nebu neb solution Commonly known as:  Meagan Dredge 15 mcg by Nebulization route two (2) times a day. aspirin 81 mg chewable tablet Take 81 mg by mouth daily. 4 Tablets Daily. atorvastatin 20 mg tablet Commonly known as:  LIPITOR  
TAKE 1 TABLET EVERY DAY Blood-Glucose Meter monitoring kit Commonly known as:  FREESTYLE LITE METER Dx E 11.5  
  
 budesonide 0.25 mg/2 mL Nbsp Commonly known as:  PULMICORT  
by Nebulization route two (2) times a day. bumetanide 1 mg tablet Commonly known as:  Silvestre Golds TAKE 1 TABLET TWICE DAILY AND THEN MAY TAKE ADDITIONAL DOSE AS NEEDED FOR EDEMA CARTIA  mg ER capsule Generic drug:  dilTIAZem CD  
 TAKE 1 CAPSULE EVERY DAY  
  
 cholecalciferol 1,000 unit tablet Commonly known as:  VITAMIN D3 Take 1 Tab by mouth daily. COQ10  PO Take 100 mg by mouth daily. esomeprazole 20 mg capsule Commonly known as:  NexIUM Take 1 Cap by mouth daily. Indications: GASTROESOPHAGEAL REFLUX  
  
 famotidine 40 mg tablet Commonly known as:  PEPCID  
TAKE 1 TAB BY MOUTH DAILY FOR GASTROESOPHAGEAL REFLUX  
  
 ferrous sulfate 325 mg (65 mg iron) tablet Take 1 Tab by mouth Daily (before breakfast). Indications: IRON DEFICIENCY ANEMIA  
  
 fluticasone-vilanterol 100-25 mcg/dose inhaler Commonly known as:  BREO ELLIPTA Take 1 Puff by inhalation daily. Indications: BRONCHOSPASM PREVENTION WITH COPD  
  
 glucose blood VI test strips strip Commonly known as:  ASCENSIA AUTODISC VI, ONE TOUCH ULTRA TEST VI Test 1 x per day * ipratropium-albuterol  mcg/actuation inhaler Commonly known as:  COMBIVENT One actuation QID * albuterol-ipratropium 2.5 mg-0.5 mg/3 ml Nebu Commonly known as:  DUO-NEB  
3 mL by Nebulization route every six (6) hours as needed. isosorbide mononitrate ER 30 mg tablet Commonly known as:  IMDUR  
TAKE 1 TABLET EVERY DAY  
  
 metoprolol succinate 100 mg tablet Commonly known as:  TOPROL-XL Take  by mouth daily. NITROSTAT 0.4 mg SL tablet Generic drug:  nitroglycerin 0.4 mg by SubLINGual route every five (5) minutes as needed. nystatin powder Commonly known as:  MYCOSTATIN Apply  to affected area three (3) times daily. OT Ultra/FastTrack Control Soln Generic drug:  Blood Glucose Control, Normal  
Dx E11.5 OXYGEN-AIR DELIVERY SYSTEMS Take 2 L by inhalation continuous. predniSONE 20 mg tablet Commonly known as:  Buck Grief Take 2 Tabs by mouth daily (with breakfast) for 4 days. Indications: gout  
  
 theophylline ER(12 hour) 450 mg tablet Commonly known as:  Syed Police  
 TAKE 1 TALETB BY MOUTH DAILY. YOU NEED BLOOD WORK FOR THIS MEDICATION  
  
 triamcinolone acetonide 0.1 % ointment Commonly known as:  KENALOG Apply  to affected area two (2) times a day. use thin layer  
  
 warfarin 5 mg tablet Commonly known as:  COUMADIN  
5 mg Monday Wednesday Friday 7.5 mg rest the week * Notice: This list has 2 medication(s) that are the same as other medications prescribed for you. Read the directions carefully, and ask your doctor or other care provider to review them with you. Prescriptions Sent to Pharmacy Refills  
 esomeprazole (NEXIUM) 20 mg capsule 1 Sig: Take 1 Cap by mouth daily. Indications: GASTROESOPHAGEAL REFLUX Class: Normal  
 Pharmacy: [address] [facility], [address] [address] [address] Ph #: [phone] Route: Oral  
 predniSONE (DELTASONE) 20 mg tablet 0 Sig: Take 2 Tabs by mouth daily (with breakfast) for 4 days. Indications: gout Class: Normal  
 Pharmacy: [address] [facility], [address] [address] [address] Ph #: [phone] Route: Oral  
  
We Performed the Following COLLECTION VENOUS BLOOD,VENIPUNCTURE [id number] CPT(R)] URIC ACID [id number] CPT(R)] Follow-up Instructions Return in about 3 weeks (around 4/25/2017). Patient Instructions Gout: Care Instructions Your Care Instructions Gout is a form of arthritis caused by a buildup of uric acid crystals in a joint. It causes sudden attacks of pain, swelling, redness, and stiffness, usually in one joint, especially the big toe. Gout usually comes on without a cause. But it can be brought on by drinking alcohol (especially beer) or eating seafood and red meat. Taking certain medicines, such as diuretics or aspirin, also can bring on an attack of gout. Taking your medicines as prescribed and following up with your doctor regularly can help you avoid gout attacks in the future. Follow-up care is a key part of your treatment and safety. Be sure to make and go to all appointments, and call your doctor if you are having problems. Its also a good idea to know your test results and keep a list of the medicines you take. How can you care for yourself at home? · If the joint is swollen, put ice or a cold pack on the area for 10 to 20 minutes at a time. Put a thin cloth between the ice and your skin. · Prop up the sore limb on a pillow when you ice it or anytime you sit or lie down during the next 3 days. Try to keep it above the level of your heart. This will help reduce swelling. · Rest sore joints. Avoid activities that put weight or strain on the joints for a few days. Take short rest breaks from your regular activities during the day. · Take your medicines exactly as prescribed. Call your doctor if you think you are having a problem with your medicine. · Take pain medicines exactly as directed. ¨ If the doctor gave you a prescription medicine for pain, take it as prescribed. ¨ If you are not taking a prescription pain medicine, ask your doctor if you can take an over-the-counter medicine. · Eat less seafood and red meat. · Check with your doctor before drinking alcohol. · Losing weight, if you are overweight, may help reduce attacks of gout. But do not go on a [employer]. \" Losing a lot of weight in a short amount of time can cause a gout attack. When should you call for help? Call your doctor now or seek immediate medical care if: 
· You have a fever. · The joint is so painful you cannot use it. · You have sudden, unexplained swelling, redness, warmth, or severe pain in one or more joints. Watch closely for changes in your health, and be sure to contact your doctor if: 
· You have joint pain. · Your symptoms get worse or are not improving after 2 or 3 days. Where can you learn more? Go to http://peter-susi.info/. Enter E495 in the search box to learn more about \"Gout: Care Instructions. \" Current as of: October 31, 2016 Content Version: 11.2 © 8479-8183 [employer], [email or web address]. Care instructions adapted under license by [employer] (which disclaims liability or warranty for this information). If you have questions about a medical condition or this instruction, always ask your healthcare professional. [address] any warranty or liability for your use of this information. Introducing \[facility] [place] [facility]\"" & HEALTH SERVICES! [employer] introduces [employer] patient portal. Now you can access parts of your medical record, email your doctor's office, and request medication refills online. 1. In your internet browser, go to [email or web address] 2. Click on the First Time User? Click Here link in the Sign In box. You will see the New Member Sign Up page. 3. Enter your [employer] Access Code exactly as it appears below. You will not need to use this code after youve completed the sign-up process. If you do not sign up before the expiration date, you must request a new code. · [employer] Access Code: [email or web address] Expires: 4/6/2017 12:48 PM 
 
4. Enter the last four digits of your Social Security Number (xxxx) and Date of Birth (mm/dd/yyyy) as indicated and click Submit. You will be taken to the next sign-up page. 5. Create a [employer] ID. This will be your [employer] login ID and cannot be changed, so think of one that is secure and easy to remember. 6. Create a [employer] password. You can change your password at any time. 7. Enter your Password Reset Question and Answer. This can be used at a later time if you forget your password. 8. Enter your e-mail address. You will receive e-mail notification when new information is available in [address] 9. Click Sign Up. You can now view and download portions of your medical record. 10. Click the Download Summary menu link to download a portable copy of your medical information. If you have questions, please visit the Frequently Asked Questions section of the [employer] website. Remember, [employer] is NOT to be used for urgent needs. For medical emergencies, dial 911. Now available from your iPhone and Android! Please provide this summary of care documentation to your next provider. Your primary care clinician is listed as Lesa Sylvester. If you have any questions after today's visit, please call [phone].

## 2017-04-05 LAB — URATE SERPL-MCNC: 13.4 MG/DL (ref 2.5–7.1)

## 2017-04-06 ENCOUNTER — TELEPHONE (OUTPATIENT)
Dept: FAMILY MEDICINE CLINIC | Age: 72
End: 2017-04-06

## 2017-04-13 ENCOUNTER — OFFICE VISIT (OUTPATIENT)
Dept: FAMILY MEDICINE CLINIC | Age: 72
End: 2017-04-13

## 2017-04-13 VITALS
DIASTOLIC BLOOD PRESSURE: 60 MMHG | HEART RATE: 84 BPM | TEMPERATURE: 98.6 F | WEIGHT: 210.6 LBS | BODY MASS INDEX: 44.21 KG/M2 | SYSTOLIC BLOOD PRESSURE: 124 MMHG | RESPIRATION RATE: 26 BRPM | HEIGHT: 58 IN | OXYGEN SATURATION: 91 %

## 2017-04-13 DIAGNOSIS — Z79.01 LONG TERM CURRENT USE OF ANTICOAGULANT: ICD-10-CM

## 2017-04-13 DIAGNOSIS — I48.0 PAROXYSMAL ATRIAL FIBRILLATION (HCC): Primary | ICD-10-CM

## 2017-04-13 DIAGNOSIS — M10.9 ACUTE GOUT OF RIGHT ANKLE, UNSPECIFIED CAUSE: ICD-10-CM

## 2017-04-13 LAB
INR BLD: 4.3
PT POC: 52 SEC
VALID INTERNAL CONTROL?: YES

## 2017-04-13 RX ORDER — ALLOPURINOL 100 MG/1
300 TABLET ORAL DAILY
Qty: 90 TAB | Refills: 1 | Status: SHIPPED | OUTPATIENT
Start: 2017-04-13 | End: 2017-04-21 | Stop reason: SDUPTHER

## 2017-04-13 NOTE — MR AVS SNAPSHOT
Visit Information Date & Time Provider Department Dept. Phone Encounter #  
 4/13/2017 11:00 AM Renaee Boas, NP [facility] [address] [facility] [phone] [id number] Your Appointments 4/21/2017 11:00 AM  
Medicare Physical with Oral Heimlich, [place] B[facility] Joaquin Suarez [address] ([address]) Appt Note: Last: none listed [address] [address] [phone]  
  
   
 [facility] [id number]  
  
    
 9/1/2017 11:00 AM  
ESTABLISHED PATIENT with Argenis Neville MD  
[address] [facility] [place] [address]) Appt Note: 6 mth fu  $0 cp  
 [address] [facility] [address] [address] [phone]  
  
   
 [address] [address] Upcoming Health Maintenance Date Due Hepatitis C Screening 1945 FOOT EXAM Q1 11/13/1956 DTaP/Tdap/Td series (1 - Tdap) 11/13/1967 FOBT Q 1 YEAR AGE 50-75 11/13/1996 EYE EXAM RETINAL OR DILATED Q1 12/9/2014 BREAST CANCER SCRN MAMMOGRAM 7/15/2015 GLAUCOMA SCREENING Q2Y 12/9/2015 MEDICARE YEARLY EXAM 12/16/2016 HEMOGLOBIN A1C Q6M 8/24/2017 MICROALBUMIN Q1 9/13/2017 LIPID PANEL Q1 2/24/2018 Allergies as of 4/13/2017  Review Complete On: 4/13/2017 By: Renaee Boas, NP Severity Noted Reaction Type Reactions Tramadol Medium 07/29/2013    Hives, Rash Pcn [Penicillins]  10/05/2015    Rash, Nausea and Vomiting Shellfish Containing Products  08/17/2012    Shortness of Breath Statins-hmg-coa Reductase Inhibitors  01/27/2015   Side Effect Myalgia Current Immunizations  Reviewed on 4/15/2016 Name Date Influenza High Dose Vaccine PF 9/26/2016, 11/17/2015 Influenza Vaccine 11/8/2013  1:28 PM  
 Pneumococcal Conjugate (PCV-13) 12/20/2015 Pneumococcal Polysaccharide (PPSV-23) 11/8/2013  1:30 PM  
 Zoster Vaccine, Live 1/1/2010 Not reviewed this visit You Were Diagnosed With   
  
 Codes Comments Paroxysmal atrial fibrillation (HCC)    -  Primary ICD-10-CM: I48.0 ICD-9-CM: 427.31 Long term current use of anticoagulant     ICD-10-CM: Z79.01 
ICD-9-CM: V58.61 Acute gout of right ankle, unspecified cause     ICD-10-CM: M10.9 ICD-9-CM: 274.01 Vitals BP Pulse Temp Resp Height(growth percentile) Weight(growth percentile) 124/60 (BP 1 Location: Left arm, BP Patient Position: Sitting) 84 98.6 °F (37 °C) (Temporal) 26 4' 10.25\" (1.48 m) 210 lb 9.6 oz (95.5 kg) SpO2 BMI OB Status Smoking Status 91% 43.64 kg/m2 Hysterectomy Former Smoker BMI and BSA Data Body Mass Index Body Surface Area  
 43.64 kg/m 2 1.98 m 2 Preferred Pharmacy Pharmacy Name Phone [facility] [place] [address], [place] [address] [phone] Your Updated Medication List  
  
   
This list is accurate as of: 4/13/17 11:54 AM.  Always use your most recent med list.  
  
  
  
  
 allopurinol 100 mg tablet Commonly known as:  Cleatus Comp Take 3 Tabs by mouth daily. Indications: GOUT  
  
 ALPRAZolam 0.5 mg tablet Commonly known as:  Kunal Poke Take 1 Tab by mouth two (2) times daily as needed for Anxiety or Sleep. Max Daily Amount: 1 mg. Indications: ANXIETY  
  
 arformoterol 15 mcg/2 mL Nebu neb solution Commonly known as:  Ryan Donath 15 mcg by Nebulization route two (2) times a day. aspirin 81 mg chewable tablet Take 81 mg by mouth daily. 4 Tablets Daily. atorvastatin 20 mg tablet Commonly known as:  LIPITOR  
TAKE 1 TABLET EVERY DAY Blood-Glucose Meter monitoring kit Commonly known as:  FREESTYLE LITE METER Dx E 11.5  
  
 budesonide 0.25 mg/2 mL Nbsp Commonly known as:  PULMICORT  
by Nebulization route two (2) times a day. bumetanide 1 mg tablet Commonly known as:  Oscar Maffucci TAKE 1 TABLET TWICE DAILY AND THEN MAY TAKE ADDITIONAL DOSE AS NEEDED FOR EDEMA CARTIA  mg ER capsule Generic drug:  dilTIAZem CD  
TAKE 1 CAPSULE EVERY DAY  
  
 cholecalciferol 1,000 unit tablet Commonly known as:  VITAMIN D3 Take 1 Tab by mouth daily. COQ10  PO Take 100 mg by mouth daily. esomeprazole 20 mg capsule Commonly known as:  NexIUM Take 1 Cap by mouth daily. Indications: GASTROESOPHAGEAL REFLUX  
  
 famotidine 40 mg tablet Commonly known as:  PEPCID  
TAKE 1 TAB BY MOUTH DAILY FOR GASTROESOPHAGEAL REFLUX  
  
 ferrous sulfate 325 mg (65 mg iron) tablet Take 1 Tab by mouth Daily (before breakfast). Indications: IRON DEFICIENCY ANEMIA  
  
 fluticasone-vilanterol 100-25 mcg/dose inhaler Commonly known as:  BREO ELLIPTA Take 1 Puff by inhalation daily. Indications: BRONCHOSPASM PREVENTION WITH COPD  
  
 glucose blood VI test strips strip Commonly known as:  ASCENSIA AUTODISC VI, ONE TOUCH ULTRA TEST VI Test 1 x per day * ipratropium-albuterol  mcg/actuation inhaler Commonly known as:  COMBIVENT One actuation QID * albuterol-ipratropium 2.5 mg-0.5 mg/3 ml Nebu Commonly known as:  DUO-NEB  
3 mL by Nebulization route every six (6) hours as needed. isosorbide mononitrate ER 30 mg tablet Commonly known as:  IMDUR  
TAKE 1 TABLET EVERY DAY  
  
 metoprolol succinate 100 mg tablet Commonly known as:  TOPROL-XL Take  by mouth daily. NITROSTAT 0.4 mg SL tablet Generic drug:  nitroglycerin 0.4 mg by SubLINGual route every five (5) minutes as needed. nystatin powder Commonly known as:  MYCOSTATIN Apply  to affected area three (3) times daily. OT Ultra/FastTrack Control Soln Generic drug:  Blood Glucose Control, Normal  
Dx E11.5 OXYGEN-AIR DELIVERY SYSTEMS Take 2 L by inhalation continuous. theophylline ER(12 hour) 450 mg tablet Commonly known as:  THEOCHRON  
TAKE 1 TALETB BY MOUTH DAILY.  YOU NEED BLOOD WORK FOR THIS MEDICATION  
  
 triamcinolone acetonide 0.1 % ointment Commonly known as:  KENALOG Apply  to affected area two (2) times a day. use thin layer  
  
 warfarin 5 mg tablet Commonly known as:  COUMADIN  
5 mg Monday Wednesday Friday 7.5 mg rest the week * Notice: This list has 2 medication(s) that are the same as other medications prescribed for you. Read the directions carefully, and ask your doctor or other care provider to review them with you. Prescriptions Sent to Pharmacy Refills  
 allopurinol (ZYLOPRIM) 100 mg tablet 1 Sig: Take 3 Tabs by mouth daily. Indications: GOUT Class: Normal  
 Pharmacy: [facility] [address] Ph #: [phone] Route: Oral  
  
We Performed the Following AMB POC PT/INR [16204 CPT(R)] COLLECTION CAPILLARY BLOOD SPECIMEN [07670 CPT(R)] Introducing [facility] SERVICES! [employer] introduces [email or web address] patient portal. Now you can access parts of your medical record, email your doctor's office, and request medication refills online. 1. In your internet browser, go to [email or web address] 2. Click on the First Time User? Click Here link in the Sign In box. You will see the New Member Sign Up page. 3. Enter your [email or web address] Access Code exactly as it appears below. You will not need to use this code after youve completed the sign-up process. If you do not sign up before the expiration date, you must request a new code. · [email or web address] Access Code: [email or web address] Expires: 7/12/2017 11:53 AM 
 
4. Enter the last four digits of your Social Security Number (xxxx) and Date of Birth (mm/dd/yyyy) as indicated and click Submit. You will be taken to the next sign-up page. 5. Create a [employer] ID. This will be your [employer] login ID and cannot be changed, so think of one that is secure and easy to remember. 6. Create a [employer] password. You can change your password at any time. 7. Enter your Password Reset Question and Answer. This can be used at a later time if you forget your password. 8. Enter your e-mail address. You will receive e-mail notification when new information is available in [address] 9. Click Sign Up. You can now view and download portions of your medical record. 10. Click the Download Summary menu link to download a portable copy of your medical information. If you have questions, please visit the Frequently Asked Questions section of the [employer] website. Remember, [employer] is NOT to be used for urgent needs. For medical emergencies, dial 911. Now available from your iPhone and Android! Please provide this summary of care documentation to your next provider. Your primary care clinician is listed as Sully Sifuentes. If you have any questions after today's visit, please call [phone].

## 2017-04-14 NOTE — PROGRESS NOTES
Subjective: Nanette Parks is a 80 y.o. female who presents today with the following:  Chief Complaint   Patient presents with    Foot Swelling     both feet    Anticoagulation   Seen last week for gout tx with prednisone   Gout: Symptoms include acute swelling, warmth and pain to the R foot 1st TMT. Started 10 days ago. . Taking APAP, helps a little. Similar to the last time she had gout. Last evaluation to date: 4/4/17. Treatment to date: rest, APAP. prednisone x 3 days. Number of flareups in past year:1/current  Current treatment: prednisone. Uric acid level 13.4 (4/4/17). ROS:  Gen: denies fever, chills, fatigue, weight loss, weight gain  HEENT:denies blurry vision, nasal congestion, sore throat  Resp: denies dypsnea, cough, wheezing  CV: denies chest pain radiating to the jaws or arms, palpitations, lower extremity edema  Abd: denies nausea, vomiting, diarrhea, constipation  Neuro: denies numbness/tingling  Endo: denies polyuria, polydipsia, heat/cold intolerance  Heme: no lymphadenopathy    Allergies   Allergen Reactions    Tramadol Hives and Rash    Pcn [Penicillins] Rash and Nausea and Vomiting    Shellfish Containing Products Shortness of Breath    Statins-Hmg-Coa Reductase Inhibitors Myalgia         Current Outpatient Prescriptions:     allopurinol (ZYLOPRIM) 100 mg tablet, Take 3 Tabs by mouth daily. Indications: GOUT, Disp: 90 Tab, Rfl: 1    esomeprazole (NEXIUM) 20 mg capsule, Take 1 Cap by mouth daily. Indications: GASTROESOPHAGEAL REFLUX, Disp: 90 Cap, Rfl: 1    triamcinolone acetonide (KENALOG) 0.1 % ointment, Apply  to affected area two (2) times a day. use thin layer, Disp: 30 g, Rfl: 3    ALPRAZolam (XANAX) 0.5 mg tablet, Take 1 Tab by mouth two (2) times daily as needed for Anxiety or Sleep. Max Daily Amount: 1 mg.  Indications: ANXIETY, Disp: 45 Tab, Rfl: 1    famotidine (PEPCID) 40 mg tablet, TAKE 1 TAB BY MOUTH DAILY FOR GASTROESOPHAGEAL REFLUX, Disp: 90 Tab, Rfl: 3   bumetanide (BUMEX) 1 mg tablet, TAKE 1 TABLET TWICE DAILY AND THEN MAY TAKE ADDITIONAL DOSE AS NEEDED FOR EDEMA, Disp: 270 Tab, Rfl: 1    theophylline ER,12 hour, (THEOCHRON) 450 mg tablet, TAKE 1 TALETB BY MOUTH DAILY. YOU NEED BLOOD WORK FOR THIS MEDICATION, Disp: 90 Tab, Rfl: 1    warfarin (COUMADIN) 5 mg tablet, 5 mg Monday Wednesday Friday 7.5 mg rest the week (Patient taking differently: 7.5 mg daily.), Disp: 135 Tab, Rfl: 1    CARTIA  mg ER capsule, TAKE 1 CAPSULE EVERY DAY, Disp: 90 Cap, Rfl: 1    nystatin (MYCOSTATIN) powder, Apply  to affected area three (3) times daily. , Disp: 60 g, Rfl: 3    atorvastatin (LIPITOR) 20 mg tablet, TAKE 1 TABLET EVERY DAY, Disp: 90 Tab, Rfl: 2    metoprolol succinate (TOPROL-XL) 100 mg tablet, Take  by mouth daily. , Disp: , Rfl:     aspirin 81 mg chewable tablet, Take 81 mg by mouth daily. 4 Tablets Daily. , Disp: , Rfl:     UBIDECARENONE/VITAMIN E MIXED (COQ10  PO), Take 100 mg by mouth daily. , Disp: , Rfl:     arformoterol (BROVANA) 15 mcg/2 mL nebu neb solution, 15 mcg by Nebulization route two (2) times a day., Disp: , Rfl:     budesonide (PULMICORT) 0.25 mg/2 mL nbsp, by Nebulization route two (2) times a day., Disp: , Rfl:     isosorbide mononitrate ER (IMDUR) 30 mg tablet, TAKE 1 TABLET EVERY DAY, Disp: 90 Tab, Rfl: 3    ferrous sulfate 325 mg (65 mg iron) tablet, Take 1 Tab by mouth Daily (before breakfast). Indications: IRON DEFICIENCY ANEMIA, Disp: 90 Tab, Rfl: 1    albuterol-ipratropium (DUO-NEB) 2.5 mg-0.5 mg/3 ml nebu, 3 mL by Nebulization route every six (6) hours as needed. , Disp: 90 mL, Rfl: 2    fluticasone-vilanterol (BREO ELLIPTA) 100-25 mcg/dose inhaler, Take 1 Puff by inhalation daily.  Indications: BRONCHOSPASM PREVENTION WITH COPD, Disp: 3 Inhaler, Rfl: 1    Blood-Glucose Meter (FREESTYLE LITE METER) monitoring kit, Dx E 11.5, Disp: 1 Kit, Rfl: 0    glucose blood VI test strips (ASCENSIA AUTODISC VI, ONE TOUCH ULTRA TEST VI) strip, Test 1 x per day, Disp: 100 Strip, Rfl: 3    OT ULTRA/FASTTRACK CONTROL soln, Dx E11.5, Disp: 2 Bottle, Rfl: 4    OXYGEN-AIR DELIVERY SYSTEMS, Take 2 L by inhalation continuous. , Disp: , Rfl:     ipratropium-albuterol (COMBIVENT)  mcg/actuation inhaler, One actuation QID, Disp: 3 Inhaler, Rfl: 3    cholecalciferol (VITAMIN D3) 1,000 unit tablet, Take 1 Tab by mouth daily. (Patient taking differently: Take 2,000 Units by mouth daily. Indications: VITAMIN D DEFICIENCY), Disp: 30 Tab, Rfl: 7    nitroglycerin (NITROSTAT) 0.4 mg SL tablet, 0.4 mg by SubLINGual route every five (5) minutes as needed.   , Disp: , Rfl:     Past Medical History:   Diagnosis Date    Adjustment disorder with depressed mood 2009    Allergic rhinitis due to other allergen 2011    Anemia, unspecified 2009    COPD     Coronary atherosclerosis of native coronary artery     Essential hypertension, benign 2008    GERD (gastroesophageal reflux disease)     Gout, unspecified 2008    Heart failure ([facility]     hx of, asymptomic now    Impaired glucose tolerance test 2012    Morbid obesity with BMI of 45.0-49.9, adult ([facility] 1/23/2015    Osteoporosis, unspecified 2009    Other and unspecified hyperlipidemia 2008    Unspecified hereditary and idiopathic peripheral neuropathy 2010    Unspecified vitamin D deficiency 2009       Past Surgical History:   Procedure Laterality Date    HX APPENDECTOMY      HX COLONOSCOPY      HX CORONARY STENT PLACEMENT  2000    LCx    HX HEART CATHETERIZATION  2000    HX HEART CATHETERIZATION  2012    LVEWDP 6, PCWP 11, Inf Hypo EF 45%, LAD 30%, mRCA 100% R to R and L to R collaterals    HX PTCA  2012    Unsuccessful attempt to opne RCA    HX ROBERT AND BSO         History   Smoking Status    Former Smoker    Packs/day: 1.00    Years: 40.00    Quit date: 12/1/2013   Smokeless Tobacco    Not on file       Social History     Social History    Marital status:      Spouse name: N/A    Number of children: N/A    Years of education: N/A     Social History Main Topics    Smoking status: Former Smoker     Packs/day: 1.00     Years: 40.00     Quit date: 12/1/2013    Smokeless tobacco: None    Alcohol use No    Drug use: No    Sexual activity: Not Asked     Other Topics Concern    None     Social History Narrative       Family History   Problem Relation Age of Onset    Arthritis-osteo Mother     Diabetes Mother     Heart Disease Mother     Lung Disease Mother     Hypertension Sister     Asthma Sister     Asthma Brother     Heart Disease Maternal Grandmother     No Known Problems Brother     No Known Problems Sister     Kidney Disease Daughter     Lupus Daughter          Objective:     Visit Vitals    /60 (BP 1 Location: Left arm, BP Patient Position: Sitting)    Pulse 84    Temp 98.6 °F (37 °C) (Temporal)    Resp 26    Ht 4' 10.25\" (1.48 m)    Wt 210 lb 9.6 oz (95.5 kg)    SpO2 91%  Comment: 02 2/L started    BMI 43.64 kg/m2     Body mass index is 43.64 kg/(m^2). Physical Examination: General appearance - alert, well appearing, and in no distress  Mental status - alert, oriented to person, place, and time  Extremities - peripheral pulses normal, 2+ pedal edema with moderate varicose vv bilat, no clubbing or cyanosis. R foot with  warmth and mod erythema to the instep medially, markedly ttp     A/p:  Acute gout. Allopurinol 300 mg daily . Repeat  uric acid levelat next visit.          Results for orders placed or performed in visit on 04/13/17   AMB POC PT/INR   Result Value Ref Range    VALID INTERNAL CONTROL POC Yes     Prothrombin time (POC) 52.0 sec    INR POC 4.3        Results for orders placed or performed in visit on 04/13/17   AMB POC PT/INR   Result Value Ref Range    VALID INTERNAL CONTROL POC Yes     Prothrombin time (POC) 52.0 sec    INR POC 4.3        Assessment/ Plan: Rocael Varner was seen today for foot swelling and anticoagulation.     Diagnoses and all orders for this visit:    Paroxysmal atrial fibrillation (HCC)  -     COLLECTION CAPILLARY BLOOD SPECIMEN  -     AMB POC PT/INR    Long term current use of anticoagulant  -     COLLECTION CAPILLARY BLOOD SPECIMEN  -     AMB POC PT/INR    Acute gout of right ankle, unspecified cause    Other orders  -     allopurinol (ZYLOPRIM) 100 mg tablet; Take 3 Tabs by mouth daily. Indications: GOUT         1. Paroxysmal atrial fibrillation (HCC)    2. Long term current use of anticoagulant    3. Acute gout of right ankle, unspecified cause        Orders Placed This Encounter    COLLECTION CAPILLARY BLOOD SPECIMEN    AMB POC PT/INR    allopurinol (ZYLOPRIM) 100 mg tablet     Sig: Take 3 Tabs by mouth daily. Indications: GOUT     Dispense:  90 Tab     Refill:  1     RTO in 2 weeks f/u Angelina DUNHAM    Verbal and written instructions (see AVS) provided.  Patient expresses understanding of diagnosis and treatment plan. RADHA Markham    Anticoagulation. PT and INR 4.3/52.0  Dx: Atrial fibrillation. Current symptoms: none  Current control agent: B-blocker  Current anticoagulant: warfarinhold x 2 days. Then 5 mg daily.    History of bleeding problems: none  Lab Results   Component Value Date/Time    INR, External 2.4 03/24/2017    INR, External 4.2 02/16/2017    INR, External 2.2 01/27/2017    INR POC 4.3 04/13/2017 11:15 AM    INR POC 2.6 03/03/2017 03:02 PM

## 2017-04-21 ENCOUNTER — OFFICE VISIT (OUTPATIENT)
Dept: FAMILY MEDICINE CLINIC | Age: 72
End: 2017-04-21

## 2017-04-21 VITALS
WEIGHT: 210 LBS | TEMPERATURE: 98.4 F | DIASTOLIC BLOOD PRESSURE: 72 MMHG | OXYGEN SATURATION: 91 % | BODY MASS INDEX: 44.08 KG/M2 | HEART RATE: 71 BPM | SYSTOLIC BLOOD PRESSURE: 146 MMHG | RESPIRATION RATE: 20 BRPM | HEIGHT: 58 IN

## 2017-04-21 DIAGNOSIS — I10 ESSENTIAL HYPERTENSION, BENIGN: ICD-10-CM

## 2017-04-21 DIAGNOSIS — N18.30 CKD (CHRONIC KIDNEY DISEASE) STAGE 3, GFR 30-59 ML/MIN (HCC): ICD-10-CM

## 2017-04-21 DIAGNOSIS — Z13.39 SCREENING FOR ALCOHOLISM: ICD-10-CM

## 2017-04-21 DIAGNOSIS — E78.2 MIXED HYPERLIPIDEMIA: ICD-10-CM

## 2017-04-21 DIAGNOSIS — Z00.00 ROUTINE GENERAL MEDICAL EXAMINATION AT A HEALTH CARE FACILITY: Primary | ICD-10-CM

## 2017-04-21 DIAGNOSIS — M10.371 ACUTE GOUT DUE TO RENAL IMPAIRMENT INVOLVING RIGHT FOOT: ICD-10-CM

## 2017-04-21 DIAGNOSIS — J42 CHRONIC BRONCHITIS, UNSPECIFIED CHRONIC BRONCHITIS TYPE (HCC): ICD-10-CM

## 2017-04-21 DIAGNOSIS — E11.9 CONTROLLED TYPE 2 DIABETES MELLITUS WITHOUT COMPLICATION, WITHOUT LONG-TERM CURRENT USE OF INSULIN (HCC): ICD-10-CM

## 2017-04-21 DIAGNOSIS — E66.01 MORBID OBESITY WITH BMI OF 45.0-49.9, ADULT (HCC): ICD-10-CM

## 2017-04-21 DIAGNOSIS — M1A.09X0 CHRONIC GOUT OF MULTIPLE SITES, UNSPECIFIED CAUSE: ICD-10-CM

## 2017-04-21 RX ORDER — PREDNISONE 20 MG/1
40 TABLET ORAL
Qty: 8 TAB | Refills: 0 | Status: SHIPPED | OUTPATIENT
Start: 2017-04-21 | End: 2017-04-25

## 2017-04-21 RX ORDER — PREDNISONE 20 MG/1
40 TABLET ORAL
Qty: 6 TAB | Refills: 0 | Status: SHIPPED | OUTPATIENT
Start: 2017-04-21 | End: 2017-04-21 | Stop reason: SDUPTHER

## 2017-04-21 RX ORDER — ALLOPURINOL 100 MG/1
200 TABLET ORAL DAILY
Qty: 90 TAB | Refills: 1
Start: 2017-04-21 | End: 2018-01-01 | Stop reason: SDUPTHER

## 2017-04-21 NOTE — PROGRESS NOTES
This is a Subsequent Medicare Annual Wellness Visit providing Personalized Prevention Plan Services (PPPS) (Performed 12 months after initial AWV and PPPS )    I have reviewed the patient's medical history in detail and updated the computerized patient record. History     Past Medical History:   Diagnosis Date    Adjustment disorder with depressed mood 2009    Allergic rhinitis due to other allergen 2011    Anemia, unspecified 2009    COPD     Coronary atherosclerosis of native coronary artery     Essential hypertension, benign 2008    GERD (gastroesophageal reflux disease)     Gout, unspecified 2008    Heart failure ([facility]     hx of, asymptomic now    Impaired glucose tolerance test 2012    Morbid obesity with BMI of 45.0-49.9, adult ([facility] 1/23/2015    Osteoporosis, unspecified 2009    Other and unspecified hyperlipidemia 2008    Unspecified hereditary and idiopathic peripheral neuropathy 2010    Unspecified vitamin D deficiency 2009      Past Surgical History:   Procedure Laterality Date    HX APPENDECTOMY      HX COLONOSCOPY      HX CORONARY STENT PLACEMENT  2000    LCx    HX HEART CATHETERIZATION  2000    HX HEART CATHETERIZATION  2012    LVEWDP 11, PCWP 11, Inf Hypo EF 45%, LAD 30%, mRCA 100% R to R and L to R collaterals    HX PTCA  2012    Unsuccessful attempt to opne RCA    HX ROBERT AND BSO       Current Outpatient Prescriptions   Medication Sig Dispense Refill    allopurinol (ZYLOPRIM) 100 mg tablet Take 3 Tabs by mouth daily. Indications: GOUT 90 Tab 1    esomeprazole (NEXIUM) 20 mg capsule Take 1 Cap by mouth daily. Indications: GASTROESOPHAGEAL REFLUX 90 Cap 1    triamcinolone acetonide (KENALOG) 0.1 % ointment Apply  to affected area two (2) times a day. use thin layer 30 g 3    ALPRAZolam (XANAX) 0.5 mg tablet Take 1 Tab by mouth two (2) times daily as needed for Anxiety or Sleep. Max Daily Amount: 1 mg.  Indications: ANXIETY 45 Tab 1    famotidine (PEPCID) 40 mg tablet TAKE 1 TAB BY MOUTH DAILY FOR GASTROESOPHAGEAL REFLUX 90 Tab 3    bumetanide (BUMEX) 1 mg tablet TAKE 1 TABLET TWICE DAILY AND THEN MAY TAKE ADDITIONAL DOSE AS NEEDED FOR EDEMA 270 Tab 1    theophylline ER,12 hour, (THEOCHRON) 450 mg tablet TAKE 1 TALETB BY MOUTH DAILY. YOU NEED BLOOD WORK FOR THIS MEDICATION 90 Tab 1    warfarin (COUMADIN) 5 mg tablet 5 mg Monday Wednesday Friday 7.5 mg rest the week (Patient taking differently: 5 mg daily.) 135 Tab 1    CARTIA  mg ER capsule TAKE 1 CAPSULE EVERY DAY 90 Cap 1    nystatin (MYCOSTATIN) powder Apply  to affected area three (3) times daily. 60 g 3    atorvastatin (LIPITOR) 20 mg tablet TAKE 1 TABLET EVERY DAY 90 Tab 2    metoprolol succinate (TOPROL-XL) 100 mg tablet Take  by mouth daily.  aspirin 81 mg chewable tablet Take 81 mg by mouth daily. 4 Tablets Daily.  UBIDECARENONE/VITAMIN E MIXED (COQ10  PO) Take 100 mg by mouth daily.  arformoterol (BROVANA) 15 mcg/2 mL nebu neb solution 15 mcg by Nebulization route two (2) times a day.  budesonide (PULMICORT) 0.25 mg/2 mL nbsp by Nebulization route two (2) times a day.  isosorbide mononitrate ER (IMDUR) 30 mg tablet TAKE 1 TABLET EVERY DAY 90 Tab 3    ferrous sulfate 325 mg (65 mg iron) tablet Take 1 Tab by mouth Daily (before breakfast). Indications: IRON DEFICIENCY ANEMIA 90 Tab 1    albuterol-ipratropium (DUO-NEB) 2.5 mg-0.5 mg/3 ml nebu 3 mL by Nebulization route every six (6) hours as needed. 90 mL 2    fluticasone-vilanterol (BREO ELLIPTA) 100-25 mcg/dose inhaler Take 1 Puff by inhalation daily. Indications: BRONCHOSPASM PREVENTION WITH COPD 3 Inhaler 1    Blood-Glucose Meter (FREESTYLE LITE METER) monitoring kit Dx E 11.5 1 Kit 0    glucose blood VI test strips (ASCENSIA AUTODISC VI, ONE TOUCH ULTRA TEST VI) strip Test 1 x per day 100 Strip 3    OT ULTRA/FASTTRACK CONTROL soln Dx E11.5 2 Bottle 4    OXYGEN-AIR DELIVERY SYSTEMS Take 2 L by inhalation continuous.  ipratropium-albuterol (COMBIVENT)  mcg/actuation inhaler One actuation QID 3 Inhaler 3    cholecalciferol (VITAMIN D3) 1,000 unit tablet Take 1 Tab by mouth daily. (Patient taking differently: Take 2,000 Units by mouth daily. Indications: VITAMIN D DEFICIENCY) 30 Tab 7    nitroglycerin (NITROSTAT) 0.4 mg SL tablet 0.4 mg by SubLINGual route every five (5) minutes as needed.          Allergies   Allergen Reactions    Tramadol Hives and Rash    Pcn [Penicillins] Rash and Nausea and Vomiting    Shellfish Containing Products Shortness of Breath    Statins-Hmg-Coa Reductase Inhibitors Myalgia     Family History   Problem Relation Age of Onset    Arthritis-osteo Mother     Diabetes Mother     Heart Disease Mother     Lung Disease Mother     Hypertension Sister     Asthma Sister     Asthma Brother     Heart Disease Maternal Grandmother     No Known Problems Brother     No Known Problems Sister     Kidney Disease Daughter     Lupus Daughter      Social History   Substance Use Topics    Smoking status: Former Smoker     Packs/day: 1.00     Years: 40.00     Quit date: 12/1/2013    Smokeless tobacco: Not on file    Alcohol use No     Patient Active Problem List   Diagnosis Code    COPD (chronic obstructive pulmonary disease) ([facility] J44.9    GERD (gastroesophageal reflux disease) K21.9    Essential hypertension, benign I10    Hyperlipidemia E78.5    Gout M10.9    Osteoporosis M81.0    Anemia D64.9    Morbid obesity with BMI of 45.0-49.9, adult ([facility] E66.01, Z68.42    Coronary atherosclerosis of native coronary artery I25.10    Heart failure ([facility]) I50.9    Paroxysmal atrial fibrillation ([facility]) I48.0    Long term current use of anticoagulant Z79.01    Bronchitis J40    Pruritic dermatitis L29.9    CYRIL on CPAP G47.33, Z99.89    Dry skin dermatitis L85.3       Depression Risk Factor Screening:     PHQ 2 / 9, over the last two weeks 4/21/2017   Little interest or pleasure in doing things Not at all   Feeling down, depressed or hopeless Not at all   Total Score PHQ 2 0     Alcohol Risk Factor Screening: On any occasion during the past 3 months, have you had more than 3 drinks containing alcohol? No    Do you average more than 7 drinks per week? No      Functional Ability and Level of Safety:     Hearing Loss   none    Activities of Daily Living   Self-care. Requires assistance with: no ADLs    Fall Risk     Fall Risk Assessment, last 12 mths 4/21/2017   Able to walk? Yes   Fall in past 12 months? No     Abuse Screen   Patient is not abused    Review of Systems   A comprehensive review of systems was negative except for that written in the HPI. Physical Examination     Evaluation of Cognitive Function:  Mood/affect:  happy  Appearance: age appropriate  Family member/caregiver input: none      Patient Care Team:  ROLY Laurent as PCP - General Jonas Carlson MD (Internal Medicine)  Joni Mendoza MD (Cardiology)    Advice/Referrals/Counseling   Education and counseling provided:  Are appropriate based on today's review and evaluation      Assessment/Plan       ICD-10-CM ICD-9-CM    1. Routine general medical examination at a health care facility Z00.00 V70.0    2. Screening for alcoholism Z13.89 V79.1    3. Chronic bronchitis, unspecified chronic bronchitis type ([facility] J42 491.9    4. Morbid obesity with BMI of 45.0-49.9, adult (HCC) E66.01 278.01     Z68.42 V85.42    5. Essential hypertension, benign I10 401.1    6. Mixed hyperlipidemia E78.2 272.2    7. Chronic gout of multiple sites, unspecified cause M1A. 09X0 274.02      lab results and schedule of future lab studies reviewed with patient.

## 2017-04-21 NOTE — MR AVS SNAPSHOT
Visit Information Date & Time Provider Department Dept. Phone Encounter #  
 4/21/2017 11:00 AM Jeny Valadez, [address] [phone] [id number] Your Appointments 9/1/2017 11:00 AM  
ESTABLISHED PATIENT with Catherine Chadwick MD  
[address] [facility]) Appt Note: 6 mth fu  $0 cp  
 [address] [id number] [phone]  
  
   
 [facility] [address] [address] Upcoming Health Maintenance Date Due Hepatitis C Screening 1945 FOOT EXAM Q1 11/13/1956 DTaP/Tdap/Td series (1 - Tdap) 11/13/1967 FOBT Q 1 YEAR AGE 50-75 11/13/1996 EYE EXAM RETINAL OR DILATED Q1 12/9/2014 GLAUCOMA SCREENING Q2Y 12/9/2015 MEDICARE YEARLY EXAM 12/16/2016 HEMOGLOBIN A1C Q6M 8/24/2017 MICROALBUMIN Q1 9/13/2017 LIPID PANEL Q1 2/24/2018 BREAST CANCER SCRN MAMMOGRAM 3/21/2019 Allergies as of 4/21/2017  Review Complete On: 4/21/2017 By: ROLY Christian Severity Noted Reaction Type Reactions Tramadol Medium 07/29/2013    Hives, Rash Pcn [Penicillins]  10/05/2015    Rash, Nausea and Vomiting Shellfish Containing Products  08/17/2012    Shortness of Breath Statins-hmg-coa Reductase Inhibitors  01/27/2015   Side Effect Myalgia Current Immunizations  Reviewed on 4/21/2017 Name Date Influenza High Dose Vaccine PF 9/26/2016, 11/17/2015 Influenza Vaccine 11/8/2013  1:28 PM  
 Pneumococcal Conjugate (PCV-13) 12/20/2015 Pneumococcal Polysaccharide (PPSV-23) 11/8/2013  1:30 PM  
 Zoster Vaccine, Live 1/1/2010 Reviewed by ROLY Christian on 4/21/2017 at 11:19 AM  
You Were Diagnosed With   
  
 Codes Comments Routine general medical examination at a health care facility    -  Primary ICD-10-CM: Z00.00 ICD-9-CM: V70.0 Screening for alcoholism     ICD-10-CM: Z13.89 ICD-9-CM: V79.1 Chronic bronchitis, unspecified chronic bronchitis type ([facility].)     ICD-10-CM: U44 ICD-9-CM: 491.9 Morbid obesity with BMI of 45.0-49.9, adult (HCC)     ICD-10-CM: E66.01, Z68.42 
ICD-9-CM: 278.01, V85.42 Essential hypertension, benign     ICD-10-CM: I10 
ICD-9-CM: 401.1 Mixed hyperlipidemia     ICD-10-CM: E78.2 ICD-9-CM: 272.2 Chronic gout of multiple sites, unspecified cause     ICD-10-CM: M1A. 70T0 ICD-9-CM: 274.02 Controlled type 2 diabetes mellitus without complication, without long-term current use of insulin ([facility].)     ICD-10-CM: E11.9 ICD-9-CM: 250.00 Acute gout due to renal impairment involving right foot     ICD-10-CM: M10.371 ICD-9-CM: 274.10 Vitals BP Pulse Temp Resp Height(growth percentile) 146/72 (BP 1 Location: Left arm, BP Patient Position: Sitting) 71 98.4 °F (36.9 °C) (Temporal) 20 4' 10.25\" (1.48 m) Weight(growth percentile) SpO2 BMI OB Status Smoking Status 210 lb (95.3 kg) 91% 43.51 kg/m2 Hysterectomy Former Smoker Vitals History BMI and BSA Data Body Mass Index Body Surface Area  
 43.51 kg/m 2 1.98 m 2 Preferred Pharmacy Pharmacy Name Phone [address], [facility] [phone] Your Updated Medication List  
  
   
This list is accurate as of: 4/21/17 11:59 AM.  Always use your most recent med list.  
  
  
  
  
 allopurinol 100 mg tablet Commonly known as:  Ralphadeel Doherty Take 2 Tabs by mouth daily. Indications: GOUT  
  
 ALPRAZolam 0.5 mg tablet Commonly known as:  Peola Jenny Take 1 Tab by mouth two (2) times daily as needed for Anxiety or Sleep. Max Daily Amount: 1 mg. Indications: ANXIETY  
  
 arformoterol 15 mcg/2 mL Nebu neb solution Commonly known as:  [place] Crumb 15 mcg by Nebulization route two (2) times a day. aspirin 81 mg chewable tablet Take 81 mg by mouth daily. 4 Tablets Daily. atorvastatin 20 mg tablet Commonly known as:  LIPITOR TAKE 1 TABLET EVERY DAY Blood-Glucose Meter monitoring kit Commonly known as:  FREESTYLE LITE METER Dx E 11.5  
  
 budesonide 0.25 mg/2 mL Nbsp Commonly known as:  PULMICORT  
by Nebulization route two (2) times a day. bumetanide 1 mg tablet Commonly known as:  Doc Emery TAKE 1 TABLET TWICE DAILY AND THEN MAY TAKE ADDITIONAL DOSE AS NEEDED FOR EDEMA CARTIA  mg ER capsule Generic drug:  dilTIAZem CD  
TAKE 1 CAPSULE EVERY DAY  
  
 cholecalciferol 1,000 unit tablet Commonly known as:  VITAMIN D3 Take 1 Tab by mouth daily. COQ10  PO Take 100 mg by mouth daily. esomeprazole 20 mg capsule Commonly known as:  NexIUM Take 1 Cap by mouth daily. Indications: GASTROESOPHAGEAL REFLUX  
  
 famotidine 40 mg tablet Commonly known as:  PEPCID  
TAKE 1 TAB BY MOUTH DAILY FOR GASTROESOPHAGEAL REFLUX  
  
 ferrous sulfate 325 mg (65 mg iron) tablet Take 1 Tab by mouth Daily (before breakfast). Indications: IRON DEFICIENCY ANEMIA  
  
 fluticasone-vilanterol 100-25 mcg/dose inhaler Commonly known as:  BREO ELLIPTA Take 1 Puff by inhalation daily. Indications: BRONCHOSPASM PREVENTION WITH COPD  
  
 glucose blood VI test strips strip Commonly known as:  ASCENSIA AUTODISC VI, ONE TOUCH ULTRA TEST VI Test 1 x per day * ipratropium-albuterol  mcg/actuation inhaler Commonly known as:  COMBIVENT One actuation QID * albuterol-ipratropium 2.5 mg-0.5 mg/3 ml Nebu Commonly known as:  DUO-NEB  
3 mL by Nebulization route every six (6) hours as needed. isosorbide mononitrate ER 30 mg tablet Commonly known as:  IMDUR  
TAKE 1 TABLET EVERY DAY  
  
 metoprolol succinate 100 mg tablet Commonly known as:  TOPROL-XL Take  by mouth daily. NITROSTAT 0.4 mg SL tablet Generic drug:  nitroglycerin 0.4 mg by SubLINGual route every five (5) minutes as needed. nystatin powder Commonly known as:  MYCOSTATIN  
 Apply  to affected area three (3) times daily. OT Ultra/FastTrack Control Soln Generic drug:  Blood Glucose Control, Normal  
Dx E11.5 OXYGEN-AIR DELIVERY SYSTEMS Take 2 L by inhalation continuous. predniSONE 20 mg tablet Commonly known as:  Chelsie Ringer Take 2 Tabs by mouth daily (with breakfast) for 4 days. 2 tabs daily with breakfast for 3 days  Indications: gout  
  
 theophylline ER(12 hour) 450 mg tablet Commonly known as:  THEOCHRON  
TAKE 1 TALETB BY MOUTH DAILY. YOU NEED BLOOD WORK FOR THIS MEDICATION  
  
 triamcinolone acetonide 0.1 % ointment Commonly known as:  KENALOG Apply  to affected area two (2) times a day. use thin layer  
  
 warfarin 5 mg tablet Commonly known as:  COUMADIN  
5 mg Monday Wednesday Friday 7.5 mg rest the week * Notice: This list has 2 medication(s) that are the same as other medications prescribed for you. Read the directions carefully, and ask your doctor or other care provider to review them with you. Prescriptions Sent to Pharmacy Refills  
 predniSONE (DELTASONE) 20 mg tablet 0 Sig: Take 2 Tabs by mouth daily (with breakfast) for 4 days. 2 tabs daily with breakfast for 3 days  Indications: gout Class: Normal  
 Pharmacy: [address], [address] [facility] Ph #: [phone] Route: Oral  
  
We Performed the Following CBC WITH AUTOMATED DIFF [37372 CPT(R)] HEMOGLOBIN A1C WITH EAG [42445 CPT(R)] LIPID PANEL WITH LDL/HDL RATIO [02231 CPT(R)] METABOLIC PANEL, COMPREHENSIVE [66485 CPT(R)] URIC ACID [id number] CPT(R)] To-Do List   
 06/01/2017 Lab:  URIC ACID Patient Instructions Medicare Wellness Visit, Female The best way to live healthy is to have a healthy lifestyle by eating a well-balanced diet, exercising regularly, limiting alcohol and stopping smoking.  
 
Regular physical exams and screening tests are another way to keep healthy. Preventive exams provided by your health care provider can find health problems before they become diseases or illnesses. Preventive services including immunizations, screening tests, monitoring and exams can help you take care of your own health. All people over age 72 should have a pneumovax  and and a prevnar shot to prevent pneumonia. These are once in a lifetime unless you and your provider decide differently. All people over 65 should have a yearly flu shot and a tetanus vaccine every 10 years. A bone mass density to screen for osteoporosis or thinning of the bones should be done every 2 years after 65. Screening for diabetes mellitus with a blood sugar test should be done every year. Glaucoma is a disease of the eye due to increased ocular pressure that can lead to blindness and it should be done every year by an eye professional. 
 
Cardiovascular screening tests that check for elevated lipids (fatty part of blood) which can lead to heart disease and strokes should be done every 5 years. Colorectal screening that evaluates for blood or polyps in your colon should be done yearly as a stool test or every five years as a flexible sigmoidoscope or every 10 years as a colonoscopy up to age 76. Breast cancer screening with a mammogram is recommended biennially  for women age 54-69. Screening for cervical cancer with a pap smear and pelvic exam is recommended for women after age 72 years every 2 years up to age 79 or when the provider and patient decide to stop. If there is a history of cervical abnormalities or other increased risk for cancer then the test is recommended yearly. Hepatitis C screening is also recommended for anyone born between [birth year] through [address]. A shingles vaccine is also recommended once in a lifetime after age 61. Your Medicare Wellness Exam is recommended annually. Here is a list of your current Health Maintenance items with a due date: Health Maintenance Due Topic Date Due  
 Hepatitis C Test  1945 [facility] Diabetic Foot Care  11/13/1956  DTaP/Tdap/Td  (1 - Tdap) 11/13/1967  Stool testing for trace blood  11/13/1996 [facility] Eye Exam  12/09/2014  Glaucoma Screening   12/09/2015 [address] [place] [address] Annual Well Visit  12/16/2016 Purine-Restricted Diet: Care Instructions Your Care Instructions Purines are substances that are found in some foods. Your body turns purines into uric acid. High levels of uric acid can cause gout, which is a form of arthritis that causes pain and inflammation in joints. You may be able to help control the amount of uric acid in your body by limiting high-purine foods in your diet. Follow-up care is a key part of your treatment and safety. Be sure to make and go to all appointments, and call your doctor if you are having problems. It's also a good idea to know your test results and keep a list of the medicines you take. How can you care for yourself at home? · Plan your meals and snacks around foods that are low in purines and are safe for you to eat. These foods include: ¨ Green vegetables and tomatoes. ¨ Fruits. ¨ Whole-grain breads, rice, and cereals. ¨ Eggs, peanut butter, and nuts. ¨ Low-fat milk, cheese, and other milk products. ¨ Popcorn. ¨ Gelatin desserts, chocolate, cocoa, and cakes and sweets, in small amounts. · You can eat certain foods that are medium-high in purines, but eat them only once in a while. These foods include: ¨ Legumes, such as dried beans and dried peas. You can have 1 cup cooked legumes each day. ¨ Asparagus, cauliflower, spinach, mushrooms, and green peas. ¨ Fish and seafood (other than very high-purine seafood). ¨ Oatmeal, wheat bran, and wheat germ. · Limit very high-purine foods, including: ¨ Organ meats, such as liver, kidneys, sweetbreads, and brains. ¨ Meats, including avilez, beef, pork, and lamb. ¨ Game meats and any other meats in large amounts. ¨ Anchovies, sardines, herring, mackerel, and scallops. ¨ Gravy. ¨ Beer. Where can you learn more? Go to http://peter-susi.info/. Enter F448 in the search box to learn more about \"Purine-Restricted Diet: Care Instructions. \" Current as of: July 26, 2016 Content Version: 11.2 © 7751-8598 General Assembly. Care instructions adapted under license by [employer] (which disclaims liability or warranty for this information). If you have questions about a medical condition or this instruction, always ask your healthcare professional. [address] [address] any warranty or liability for your use of this information. Introducing hospitals & HEALTH SERVICES! Nimo Santana introduces FIZZA patient portal. Now you can access parts of your medical record, email your doctor's office, and request medication refills online. 1. In your internet browser, go to [email or web address] 2. Click on the First Time User? Click Here link in the Sign In box. You will see the New Member Sign Up page. 3. Enter your FIZZA Access Code exactly as it appears below. You will not need to use this code after youve completed the sign-up process. If you do not sign up before the expiration date, you must request a new code. · FIZZA Access Code: [email or web address] Expires: 7/12/2017 11:53 AM 
 
4. Enter the last four digits of your Social Security Number (xxxx) and Date of Birth (mm/dd/yyyy) as indicated and click Submit. You will be taken to the next sign-up page. 5. Create a BrightEdget ID. This will be your FIZZA login ID and cannot be changed, so think of one that is secure and easy to remember. 6. Create a FIZZA password. You can change your password at any time. 7. Enter your Password Reset Question and Answer. This can be used at a later time if you forget your password. 8. Enter your e-mail address.  You will receive e-mail notification when new information is available in Mengcao. 9. Click Sign Up. You can now view and download portions of your medical record. 10. Click the Download Summary menu link to download a portable copy of your medical information. If you have questions, please visit the Frequently Asked Questions section of the Mengcao website. Remember, Mengcao is NOT to be used for urgent needs. For medical emergencies, dial 911. Now available from your iPhone and Android! Please provide this summary of care documentation to your next provider. Your primary care clinician is listed as Bk Serra. If you have any questions after today's visit, please call [phone].

## 2017-04-21 NOTE — PATIENT INSTRUCTIONS
Medicare Wellness Visit, Female    The best way to live healthy is to have a healthy lifestyle by eating a well-balanced diet, exercising regularly, limiting alcohol and stopping smoking. Regular physical exams and screening tests are another way to keep healthy. Preventive exams provided by your health care provider can find health problems before they become diseases or illnesses. Preventive services including immunizations, screening tests, monitoring and exams can help you take care of your own health. All people over age 72 should have a pneumovax  and and a prevnar shot to prevent pneumonia. These are once in a lifetime unless you and your provider decide differently. All people over 65 should have a yearly flu shot and a tetanus vaccine every 10 years. A bone mass density to screen for osteoporosis or thinning of the bones should be done every 2 years after 65. Screening for diabetes mellitus with a blood sugar test should be done every year. Glaucoma is a disease of the eye due to increased ocular pressure that can lead to blindness and it should be done every year by an eye professional.    Cardiovascular screening tests that check for elevated lipids (fatty part of blood) which can lead to heart disease and strokes should be done every 5 years. Colorectal screening that evaluates for blood or polyps in your colon should be done yearly as a stool test or every five years as a flexible sigmoidoscope or every 10 years as a colonoscopy up to age 76. Breast cancer screening with a mammogram is recommended biennially  for women age 54-69. Screening for cervical cancer with a pap smear and pelvic exam is recommended for women after age 72 years every 2 years up to age 79 or when the provider and patient decide to stop. If there is a history of cervical abnormalities or other increased risk for cancer then the test is recommended yearly.     Hepatitis C screening is also recommended for anyone born between [birth year] [facility]. A shingles vaccine is also recommended once in a lifetime after age 61. Your Medicare Wellness Exam is recommended annually. Here is a list of your current Health Maintenance items with a due date:  Health Maintenance Due   Topic Date Due    Hepatitis C Test  1945    Diabetic Foot Care  11/13/1956    DTaP/Tdap/Td  (1 - Tdap) 11/13/1967    Stool testing for trace blood  11/13/1996    Eye Exam  12/09/2014    Glaucoma Screening   12/09/2015    Annual Well Visit  12/16/2016          Purine-Restricted Diet: Care Instructions  Your Care Instructions  Purines are substances that are found in some foods. Your body turns purines into uric acid. High levels of uric acid can cause gout, which is a form of arthritis that causes pain and inflammation in joints. You may be able to help control the amount of uric acid in your body by limiting high-purine foods in your diet. Follow-up care is a key part of your treatment and safety. Be sure to make and go to all appointments, and call your doctor if you are having problems. It's also a good idea to know your test results and keep a list of the medicines you take. How can you care for yourself at home? · Plan your meals and snacks around foods that are low in purines and are safe for you to eat. These foods include:  ¨ Green vegetables and tomatoes. ¨ Fruits. ¨ Whole-grain breads, rice, and cereals. ¨ Eggs, peanut butter, and nuts. ¨ Low-fat milk, cheese, and other milk products. ¨ Popcorn. ¨ Gelatin desserts, chocolate, cocoa, and cakes and sweets, in small amounts. · You can eat certain foods that are medium-high in purines, but eat them only once in a while. These foods include:  ¨ Legumes, such as dried beans and dried peas. You can have 1 cup cooked legumes each day. ¨ Asparagus, cauliflower, spinach, mushrooms, and green peas. ¨ Fish and seafood (other than very high-purine seafood).   ¨ Oatmeal, wheat bran, and wheat germ. · Limit very high-purine foods, including:  ¨ Organ meats, such as liver, kidneys, sweetbreads, and brains. ¨ Meats, including avilez, beef, pork, and lamb. ¨ Game meats and any other meats in large amounts. ¨ Anchovies, sardines, herring, mackerel, and scallops. ¨ Gravy. ¨ Beer. Where can you learn more? Go to http://peter-susi.info/. Enter F448 in the search box to learn more about \"Purine-Restricted Diet: Care Instructions. \"  Current as of: July 26, 2016  Content Version: 11.2  © 8340-7186 [facility]. Care instructions adapted under license by [employer] (which disclaims liability or warranty for this information). If you have questions about a medical condition or this instruction, always ask your healthcare professional. [address] any warranty or liability for your use of this information.

## 2017-04-21 NOTE — PROGRESS NOTES
[address]   [phone]     4/21/2017  Chief Complaint   Patient presents with    Annual Wellness Visit    Foot Pain     both       HPI  Ms. Lexx Pires is a 80 y.o. female     Gout- acute attack 2 weeks ago. Patient reports pain improved with treatment but the pain began to reoccur last night. Patient Active Problem List   Diagnosis Code    COPD (chronic obstructive pulmonary disease) ([facility] J44.9    GERD (gastroesophageal reflux disease) K21.9    Essential hypertension, benign I10    Hyperlipidemia E78.5    Gout M10.9    Osteoporosis M81.0    Anemia D64.9    Morbid obesity with BMI of 45.0-49.9, adult ([facility] E66.01, Z68.42    Coronary atherosclerosis of native coronary artery I25.10    Heart failure ([facility] I50.9    Paroxysmal atrial fibrillation (HCC) I48.0    Long term current use of anticoagulant Z79.01    Bronchitis J40    Pruritic dermatitis L29.9    CYRIL on CPAP G47.33, Z99.89    Dry skin dermatitis L85.3    Chronic gout of multiple sites M1A. 200X0    CKD (chronic kidney disease) stage 3, GFR 30-59 ml/min N18.3      Allergies   Allergen Reactions    Tramadol Hives and Rash    Pcn [Penicillins] Rash and Nausea and Vomiting    Shellfish Containing Products Shortness of Breath    Statins-Hmg-Coa Reductase Inhibitors Myalgia       Current Outpatient Prescriptions on File Prior to Visit   Medication Sig Dispense Refill    esomeprazole (NEXIUM) 20 mg capsule Take 1 Cap by mouth daily. Indications: GASTROESOPHAGEAL REFLUX 90 Cap 1    triamcinolone acetonide (KENALOG) 0.1 % ointment Apply  to affected area two (2) times a day. use thin layer 30 g 3    ALPRAZolam (XANAX) 0.5 mg tablet Take 1 Tab by mouth two (2) times daily as needed for Anxiety or Sleep. Max Daily Amount: 1 mg.  Indications: ANXIETY 45 Tab 1    famotidine (PEPCID) 40 mg tablet TAKE 1 TAB BY MOUTH DAILY FOR GASTROESOPHAGEAL REFLUX 90 Tab 3    bumetanide (BUMEX) 1 mg tablet TAKE 1 TABLET TWICE DAILY AND THEN MAY TAKE ADDITIONAL DOSE AS NEEDED FOR EDEMA 270 Tab 1    theophylline ER,12 hour, (THEOCHRON) 450 mg tablet TAKE 1 TALETB BY MOUTH DAILY. YOU NEED BLOOD WORK FOR THIS MEDICATION 90 Tab 1    warfarin (COUMADIN) 5 mg tablet 5 mg Monday Wednesday Friday 7.5 mg rest the week (Patient taking differently: 5 mg daily.) 135 Tab 1    CARTIA  mg ER capsule TAKE 1 CAPSULE EVERY DAY 90 Cap 1    nystatin (MYCOSTATIN) powder Apply  to affected area three (3) times daily. 60 g 3    atorvastatin (LIPITOR) 20 mg tablet TAKE 1 TABLET EVERY DAY 90 Tab 2    metoprolol succinate (TOPROL-XL) 100 mg tablet Take  by mouth daily.  aspirin 81 mg chewable tablet Take 81 mg by mouth daily. 4 Tablets Daily.  UBIDECARENONE/VITAMIN E MIXED (COQ10  PO) Take 100 mg by mouth daily.  arformoterol (BROVANA) 15 mcg/2 mL nebu neb solution 15 mcg by Nebulization route two (2) times a day.  budesonide (PULMICORT) 0.25 mg/2 mL nbsp by Nebulization route two (2) times a day.  isosorbide mononitrate ER (IMDUR) 30 mg tablet TAKE 1 TABLET EVERY DAY 90 Tab 3    ferrous sulfate 325 mg (65 mg iron) tablet Take 1 Tab by mouth Daily (before breakfast). Indications: IRON DEFICIENCY ANEMIA 90 Tab 1    albuterol-ipratropium (DUO-NEB) 2.5 mg-0.5 mg/3 ml nebu 3 mL by Nebulization route every six (6) hours as needed. 90 mL 2    fluticasone-vilanterol (BREO ELLIPTA) 100-25 mcg/dose inhaler Take 1 Puff by inhalation daily. Indications: BRONCHOSPASM PREVENTION WITH COPD 3 Inhaler 1    Blood-Glucose Meter (FREESTYLE LITE METER) monitoring kit Dx E 11.5 1 Kit 0    glucose blood VI test strips (ASCENSIA AUTODISC VI, ONE TOUCH ULTRA TEST VI) strip Test 1 x per day 100 Strip 3    OT ULTRA/FASTTRACK CONTROL soln Dx E11.5 2 Bottle 4    OXYGEN-AIR DELIVERY SYSTEMS Take 2 L by inhalation continuous.       ipratropium-albuterol (COMBIVENT)  mcg/actuation inhaler One actuation QID 3 Inhaler 3    cholecalciferol (VITAMIN D3) 1,000 unit tablet Take 1 Tab by mouth daily. (Patient taking differently: Take 2,000 Units by mouth daily. Indications: VITAMIN D DEFICIENCY) 30 Tab 7    nitroglycerin (NITROSTAT) 0.4 mg SL tablet 0.4 mg by SubLINGual route every five (5) minutes as needed. No current facility-administered medications on file prior to visit. Visit Vitals    /72 (BP 1 Location: Left arm, BP Patient Position: Sitting)    Pulse 71    Temp 98.4 °F (36.9 °C) (Temporal)    Resp 20    Ht 4' 10.25\" (1.48 m)    Wt 210 lb (95.3 kg)    SpO2 91%    BMI 43.51 kg/m2     Physical Exam   Cardiovascular: Normal heart sounds. Pulmonary/Chest: Effort normal. She has decreased breath sounds in the right lower field and the left lower field. She has no wheezes. Musculoskeletal:        Right foot: There is swelling. Left foot: There is swelling. Swelling with no erythema noted on feet bilaterally    Vitals reviewed. [facility] was seen today for annual wellness visit and foot pain. Diagnoses and all orders for this visit:    Routine general medical examination at a health care facility  -     CBC WITH AUTOMATED DIFF    Screening for alcoholism    Chronic bronchitis, unspecified chronic bronchitis type (HCC)    CKD (chronic kidney disease) stage 3, GFR 30-59 ml/min    Acute gout due to renal impairment involving right foot  -     predniSONE (DELTASONE) 20 mg tablet; Take 2 Tabs by mouth daily (with breakfast) for 4 days. 2 tabs daily with breakfast for 3 days  Indications: gout  -     URIC ACID;  Future  -     URIC ACID    Morbid obesity with BMI of 45.0-49.9, adult ([facility])    Controlled type 2 diabetes mellitus without complication, without long-term current use of insulin ([facility])  -     HEMOGLOBIN A1C WITH EAG    Essential hypertension, benign  -     METABOLIC PANEL, COMPREHENSIVE    Mixed hyperlipidemia  -     LIPID PANEL WITH LDL/HDL RATIO    Chronic gout of multiple sites, unspecified cause    Other orders  -     allopurinol (ZYLOPRIM) 100 mg tablet; Take 2 Tabs by mouth daily. Indications: GOUT      Plan   Discussed treatment for gout using allopurinol can precipitate acute attacks. Advised to decrease dosing of allopurinol to 2 tablets daily. To use prednisone if pain increases in the next 245-48 hours. Labs pending. Follow-up Disposition:  Return in about 2 months (around 6/21/2017) for for labs only. repeat Uric acid level.           Diana Vallejo PA-C, BRYANNA

## 2017-04-22 LAB
ALBUMIN SERPL-MCNC: 4.2 G/DL (ref 3.5–4.8)
ALBUMIN/GLOB SERPL: 1.6 {RATIO} (ref 1.2–2.2)
ALP SERPL-CCNC: 131 IU/L (ref 39–117)
ALT SERPL-CCNC: 10 IU/L (ref 0–32)
AST SERPL-CCNC: 11 IU/L (ref 0–40)
BASOPHILS # BLD AUTO: 0.1 X10E3/UL (ref 0–0.2)
BASOPHILS NFR BLD AUTO: 1 %
BILIRUB SERPL-MCNC: 0.4 MG/DL (ref 0–1.2)
BUN SERPL-MCNC: 59 MG/DL (ref 8–27)
BUN/CREAT SERPL: 38 (ref 12–28)
CALCIUM SERPL-MCNC: 9.8 MG/DL (ref 8.7–10.3)
CHLORIDE SERPL-SCNC: 96 MMOL/L (ref 96–106)
CHOLEST SERPL-MCNC: 276 MG/DL (ref 100–199)
CO2 SERPL-SCNC: 25 MMOL/L (ref 18–29)
CREAT SERPL-MCNC: 1.54 MG/DL (ref 0.57–1)
EOSINOPHIL # BLD AUTO: 0.3 X10E3/UL (ref 0–0.4)
EOSINOPHIL NFR BLD AUTO: 2 %
ERYTHROCYTE [DISTWIDTH] IN BLOOD BY AUTOMATED COUNT: 16.2 % (ref 12.3–15.4)
EST. AVERAGE GLUCOSE BLD GHB EST-MCNC: 140 MG/DL
GLOBULIN SER CALC-MCNC: 2.7 G/DL (ref 1.5–4.5)
GLUCOSE SERPL-MCNC: 152 MG/DL (ref 65–99)
HBA1C MFR BLD: 6.5 % (ref 4.8–5.6)
HCT VFR BLD AUTO: 34.1 % (ref 34–46.6)
HDLC SERPL-MCNC: 65 MG/DL
HGB BLD-MCNC: 10.7 G/DL (ref 11.1–15.9)
IMM GRANULOCYTES # BLD: 0 X10E3/UL (ref 0–0.1)
IMM GRANULOCYTES NFR BLD: 0 %
INTERPRETATION: NORMAL
LDLC SERPL CALC-MCNC: 139 MG/DL (ref 0–99)
LDLC/HDLC SERPL: 2.1 RATIO UNITS (ref 0–3.2)
LYMPHOCYTES # BLD AUTO: 1.9 X10E3/UL (ref 0.7–3.1)
LYMPHOCYTES NFR BLD AUTO: 15 %
MCH RBC QN AUTO: 27 PG (ref 26.6–33)
MCHC RBC AUTO-ENTMCNC: 31.4 G/DL (ref 31.5–35.7)
MCV RBC AUTO: 86 FL (ref 79–97)
MONOCYTES # BLD AUTO: 0.7 X10E3/UL (ref 0.1–0.9)
MONOCYTES NFR BLD AUTO: 5 %
NEUTROPHILS # BLD AUTO: 10 X10E3/UL (ref 1.4–7)
NEUTROPHILS NFR BLD AUTO: 77 %
PLATELET # BLD AUTO: 278 X10E3/UL (ref 150–379)
POTASSIUM SERPL-SCNC: 4.8 MMOL/L (ref 3.5–5.2)
PROT SERPL-MCNC: 6.9 G/DL (ref 6–8.5)
RBC # BLD AUTO: 3.96 X10E6/UL (ref 3.77–5.28)
SODIUM SERPL-SCNC: 140 MMOL/L (ref 134–144)
TRIGL SERPL-MCNC: 358 MG/DL (ref 0–149)
URATE SERPL-MCNC: 9.4 MG/DL (ref 2.5–7.1)
VLDLC SERPL CALC-MCNC: 72 MG/DL (ref 5–40)
WBC # BLD AUTO: 13 X10E3/UL (ref 3.4–10.8)

## 2017-04-27 ENCOUNTER — TELEPHONE (OUTPATIENT)
Dept: FAMILY MEDICINE CLINIC | Age: 72
End: 2017-04-27

## 2017-04-27 DIAGNOSIS — E13.40 DIABETIC NEUROPATHY ASSOCIATED WITH OTHER SPECIFIED DIABETES MELLITUS: Primary | ICD-10-CM

## 2017-04-28 NOTE — PROGRESS NOTES
Cholesterol, triglyceride and LDL levels are increased from 2 months ago but good cholesterol HDL has increased and the ratio is stable. Kidney function is slightly worse. You are dehydrated.   Increase fluid intake   Blood sugar level is stable and well controlled     F/u in 3 months to repeat blood work

## 2017-05-05 RX ORDER — GABAPENTIN 300 MG/1
CAPSULE ORAL
Qty: 60 CAP | Refills: 6 | Status: SHIPPED | OUTPATIENT
Start: 2017-05-05 | End: 2017-09-21 | Stop reason: SDUPTHER

## 2017-05-05 NOTE — TELEPHONE ENCOUNTER
Spoke with Brenton Venita Sanchez    Continued c/o bilateral foot pain, R>L    Swelling has improved  No redness   Denies burning sensation but has occasional tingling     States it is different than the gout pain.

## 2017-05-10 DIAGNOSIS — Z79.01 LONG TERM CURRENT USE OF ANTICOAGULANT: ICD-10-CM

## 2017-05-12 RX ORDER — WARFARIN SODIUM 5 MG/1
TABLET ORAL
Qty: 135 TAB | Refills: 1 | Status: SHIPPED | OUTPATIENT
Start: 2017-05-12 | End: 2017-08-09 | Stop reason: SDUPTHER

## 2017-05-26 RX ORDER — ATORVASTATIN CALCIUM 20 MG/1
TABLET, FILM COATED ORAL
Qty: 90 TAB | Refills: 2 | Status: SHIPPED | OUTPATIENT
Start: 2017-05-26 | End: 2018-01-01 | Stop reason: SDUPTHER

## 2017-05-26 RX ORDER — METOPROLOL SUCCINATE 100 MG/1
50 TABLET, EXTENDED RELEASE ORAL DAILY
Qty: 90 TAB | Refills: 3 | Status: SHIPPED | OUTPATIENT
Start: 2017-05-26 | End: 2017-08-09 | Stop reason: DRUGHIGH

## 2017-05-30 RX ORDER — DILTIAZEM HYDROCHLORIDE 120 MG/1
CAPSULE, COATED, EXTENDED RELEASE ORAL
Qty: 90 CAP | Refills: 1 | Status: SHIPPED | OUTPATIENT
Start: 2017-05-30 | End: 2018-01-01 | Stop reason: SDUPTHER

## 2017-06-09 DIAGNOSIS — I50.9 CHRONIC CONGESTIVE HEART FAILURE, UNSPECIFIED CONGESTIVE HEART FAILURE TYPE: ICD-10-CM

## 2017-06-09 RX ORDER — BUMETANIDE 1 MG/1
TABLET ORAL
Qty: 300 TAB | Refills: 1 | Status: SHIPPED | OUTPATIENT
Start: 2017-06-09 | End: 2017-06-23 | Stop reason: SDUPTHER

## 2017-06-09 RX ORDER — METOLAZONE 2.5 MG/1
2.5 TABLET ORAL EVERY OTHER DAY
Qty: 45 TAB | Refills: 1 | Status: SHIPPED | OUTPATIENT
Start: 2017-06-09 | End: 2017-06-23 | Stop reason: SDUPTHER

## 2017-06-09 RX ORDER — METOPROLOL TARTRATE 50 MG/1
50 TABLET ORAL 2 TIMES DAILY
Qty: 180 TAB | Refills: 3 | Status: SHIPPED | OUTPATIENT
Start: 2017-06-09 | End: 2018-01-01 | Stop reason: SDUPTHER

## 2017-06-09 NOTE — TELEPHONE ENCOUNTER
Spoke with Brenton Nat Cabot     she reports increased peripheral edema and has increased the dose of the Bumex to 3 pills BID with minimal improvement. Plan:    Begin metolazone every other day for edema.

## 2017-06-09 NOTE — TELEPHONE ENCOUNTER
Spoke with patient needs some refills ,reorders done. She wanted me to advise Shnaice Lanius that swelling edema has gotten worse. Has increased Bumex to 3 pills bid. Can something else be called in ? Please advise.

## 2017-06-12 ENCOUNTER — TELEPHONE (OUTPATIENT)
Dept: FAMILY MEDICINE CLINIC | Age: 72
End: 2017-06-12

## 2017-06-12 NOTE — TELEPHONE ENCOUNTER
Pharmacist calling had a Rx for metoprolol for 1/2 tablet daily. Then a new Rx come in 06-09-17 for 50 mg 1 pill bid . Just wanted to confirm the latest Rx is the correct Rx to fill which is what patient has been on all along. Advised pharmacy [patient os on 50 mg 1 bid.

## 2017-06-22 ENCOUNTER — TELEPHONE (OUTPATIENT)
Dept: FAMILY MEDICINE CLINIC | Age: 72
End: 2017-06-22

## 2017-06-23 ENCOUNTER — OFFICE VISIT (OUTPATIENT)
Dept: FAMILY MEDICINE CLINIC | Age: 72
End: 2017-06-23

## 2017-06-23 VITALS
SYSTOLIC BLOOD PRESSURE: 124 MMHG | HEART RATE: 74 BPM | BODY MASS INDEX: 44.42 KG/M2 | DIASTOLIC BLOOD PRESSURE: 68 MMHG | OXYGEN SATURATION: 91 % | RESPIRATION RATE: 20 BRPM | TEMPERATURE: 97.7 F | WEIGHT: 211.6 LBS | HEIGHT: 58 IN

## 2017-06-23 DIAGNOSIS — R60.0 BILATERAL EDEMA OF LOWER EXTREMITY: ICD-10-CM

## 2017-06-23 DIAGNOSIS — I50.9 CHRONIC CONGESTIVE HEART FAILURE, UNSPECIFIED CONGESTIVE HEART FAILURE TYPE: Primary | ICD-10-CM

## 2017-06-23 DIAGNOSIS — N18.30 CKD (CHRONIC KIDNEY DISEASE) STAGE 3, GFR 30-59 ML/MIN (HCC): ICD-10-CM

## 2017-06-23 RX ORDER — BUMETANIDE 1 MG/1
2 TABLET ORAL DAILY
Qty: 300 TAB | Refills: 1
Start: 2017-06-23 | End: 2017-08-09 | Stop reason: SDUPTHER

## 2017-06-23 RX ORDER — METOLAZONE 2.5 MG/1
5 TABLET ORAL EVERY OTHER DAY
Qty: 45 TAB | Refills: 1
Start: 2017-06-23 | End: 2017-07-28 | Stop reason: SDUPTHER

## 2017-06-23 NOTE — MR AVS SNAPSHOT
Visit Information Date & Time Provider Department Dept. Phone Encounter #  
 6/23/2017 11:30 AM Kale Avalos, [address] [phone] [id number] Your Appointments 9/1/2017 11:00 AM  
ESTABLISHED PATIENT with Manas Esparza MD  
[address] [facility] Appt Note: 6 mth fu  $0 cp  
 [address] [id number] [phone]  
  
   
 [address] [address] Upcoming Health Maintenance Date Due Hepatitis C Screening 1945 FOOT EXAM Q1 11/13/1956 DTaP/Tdap/Td series (1 - Tdap) 11/13/1967 FOBT Q 1 YEAR AGE 50-75 11/13/1996 EYE EXAM RETINAL OR DILATED Q1 12/9/2014 GLAUCOMA SCREENING Q2Y 12/9/2015 INFLUENZA AGE 9 TO ADULT 8/1/2017 MICROALBUMIN Q1 9/13/2017 HEMOGLOBIN A1C Q6M 10/21/2017 LIPID PANEL Q1 4/21/2018 MEDICARE YEARLY EXAM 4/22/2018 BREAST CANCER SCRN MAMMOGRAM 3/21/2019 Allergies as of 6/23/2017  Review Complete On: 6/23/2017 By: ROLY Park Severity Noted Reaction Type Reactions Tramadol Medium 07/29/2013    Hives, Rash Pcn [Penicillins]  10/05/2015    Rash, Nausea and Vomiting Shellfish Containing Products  08/17/2012    Shortness of Breath Statins-hmg-coa Reductase Inhibitors  01/27/2015   Side Effect Myalgia Current Immunizations  Reviewed on 4/21/2017 Name Date Influenza High Dose Vaccine PF 9/26/2016, 11/17/2015 Influenza Vaccine 11/8/2013  1:28 PM  
 Pneumococcal Conjugate (PCV-13) 12/20/2015 Pneumococcal Polysaccharide (PPSV-23) 11/8/2013  1:30 PM  
 Zoster Vaccine, Live 1/1/2010 Not reviewed this visit You Were Diagnosed With   
  
 Codes Comments Chronic congestive heart failure, unspecified congestive heart failure type ([facility]    -  Primary ICD-10-CM: I50.9 ICD-9-CM: 428.0 Bilateral edema of lower extremity     ICD-10-CM: R60.0 ICD-9-CM: 782.3 CKD (chronic kidney disease) stage 3, GFR 30-59 ml/min     ICD-10-CM: N18.3 ICD-9-CM: 887. 3 Vitals BP Pulse Temp Resp Height(growth percentile) 124/68 (BP 1 Location: Left arm, BP Patient Position: Sitting) 74 97.7 °F (36.5 °C) (Temporal) 20 4' 10.25\" (1.48 m) Weight(growth percentile) SpO2 BMI OB Status Smoking Status 211 lb 9.6 oz (96 kg) 91% 43.85 kg/m2 Hysterectomy Former Smoker BMI and BSA Data Body Mass Index Body Surface Area  
 43.85 kg/m 2 1.99 m 2 Preferred Pharmacy Pharmacy Name Phone [facility]  [address] [facility] [address] [phone] Your Updated Medication List  
  
   
This list is accurate as of: 6/23/17 12:18 PM.  Always use your most recent med list.  
  
  
  
  
 allopurinol 100 mg tablet Commonly known as:  Austin Bones Take 2 Tabs by mouth daily. Indications: GOUT  
  
 ALPRAZolam 0.5 mg tablet Commonly known as:  Berny Fuse Take 1 Tab by mouth two (2) times daily as needed for Anxiety or Sleep. Max Daily Amount: 1 mg. Indications: ANXIETY  
  
 arformoterol 15 mcg/2 mL Nebu neb solution Commonly known as:  [place] Leech 15 mcg by Nebulization route two (2) times a day. aspirin 81 mg chewable tablet Take 81 mg by mouth daily. 4 Tablets Daily. atorvastatin 20 mg tablet Commonly known as:  LIPITOR  
TAKE 1 TABLET EVERY DAY Blood-Glucose Meter monitoring kit Commonly known as:  FREESTYLE LITE METER Dx E 11.5  
  
 budesonide 0.25 mg/2 mL Nbsp Commonly known as:  PULMICORT  
by Nebulization route two (2) times a day. bumetanide 1 mg tablet Commonly known as:  Vicenta Finders Take 2 Tabs by mouth daily. TAKE 1 TABLET TWICE DAILY AND THEN MAY TAKE ADDITIONAL DOSE AS NEEDED FOR EDEMA  Indications: Edema  
  
 cholecalciferol 1,000 unit tablet Commonly known as:  VITAMIN D3 Take 1 Tab by mouth daily. COQ10  PO Take 100 mg by mouth daily. dilTIAZem  mg ER capsule Commonly known as:  CARTIA XT  
TAKE 1 CAPSULE EVERY DAY  
  
 esomeprazole 20 mg capsule Commonly known as:  NexIUM Take 1 Cap by mouth daily. Indications: GASTROESOPHAGEAL REFLUX  
  
 famotidine 40 mg tablet Commonly known as:  PEPCID  
TAKE 1 TAB BY MOUTH DAILY FOR GASTROESOPHAGEAL REFLUX  
  
 ferrous sulfate 325 mg (65 mg iron) tablet Take 1 Tab by mouth Daily (before breakfast). Indications: IRON DEFICIENCY ANEMIA  
  
 fluticasone-vilanterol 100-25 mcg/dose inhaler Commonly known as:  BREO ELLIPTA Take 1 Puff by inhalation daily. Indications: BRONCHOSPASM PREVENTION WITH COPD  
  
 gabapentin 300 mg capsule Commonly known as:  NEURONTIN Take 1 tablet nightly for 3 weeks then increase 2 tablets. Indications: NEUROPATHIC PAIN  
  
 glucose blood VI test strips strip Commonly known as:  ASCENSIA AUTODISC VI, ONE TOUCH ULTRA TEST VI Test 1 x per day * ipratropium-albuterol  mcg/actuation inhaler Commonly known as:  COMBIVENT One actuation QID * albuterol-ipratropium 2.5 mg-0.5 mg/3 ml Nebu Commonly known as:  DUO-NEB  
3 mL by Nebulization route every six (6) hours as needed. isosorbide mononitrate ER 30 mg tablet Commonly known as:  IMDUR  
TAKE 1 TABLET EVERY DAY  
  
 metOLazone 2.5 mg tablet Commonly known as:  Sarai Haus Take 2 Tabs by mouth every other day. Indications: PERIPHERAL EDEMA DUE TO CHRONIC HEART FAILURE  
  
 metoprolol succinate 100 mg tablet Commonly known as:  TOPROL-XL Take 0.5 Tabs by mouth daily. metoprolol tartrate 50 mg tablet Commonly known as:  LOPRESSOR Take 1 Tab by mouth two (2) times a day. NITROSTAT 0.4 mg SL tablet Generic drug:  nitroglycerin 0.4 mg by SubLINGual route every five (5) minutes as needed. nystatin powder Commonly known as:  MYCOSTATIN Apply  to affected area three (3) times daily. OT Ultra/FastTrack Control Soln Generic drug:  Blood Glucose Control, Normal  
Dx E11.5 OXYGEN-AIR DELIVERY SYSTEMS Take 2 L by inhalation continuous. theophylline ER(12 hour) 450 mg tablet Commonly known as:  THEOCHRON  
TAKE 1 TALETB BY MOUTH DAILY. YOU NEED BLOOD WORK FOR THIS MEDICATION  
  
 triamcinolone acetonide 0.1 % ointment Commonly known as:  KENALOG Apply  to affected area two (2) times a day. use thin layer  
  
 warfarin 5 mg tablet Commonly known as:  COUMADIN  
5 mg Monday Wednesday Friday 7.5 mg rest the week * Notice: This list has 2 medication(s) that are the same as other medications prescribed for you. Read the directions carefully, and ask your doctor or other care provider to review them with you. We Performed the Following COLLECTION CAPILLARY BLOOD SPECIMEN [56670 CPT(R)] METABOLIC PANEL, BASIC [73160 CPT(R)] Introducing [facility]! Marj Mills introduces [employer] patient portal. Now you can access parts of your medical record, email your doctor's office, and request medication refills online. 1. In your internet browser, go to [email or web address] 2. Click on the First Time User? Click Here link in the Sign In box. You will see the New Member Sign Up page. 3. Enter your [employer] Access Code exactly as it appears below. You will not need to use this code after youve completed the sign-up process. If you do not sign up before the expiration date, you must request a new code. · [employer] Access Code: [email or web address] Expires: 7/12/2017 11:53 AM 
 
4. Enter the last four digits of your Social Security Number (xxxx) and Date of Birth (mm/dd/yyyy) as indicated and click Submit. You will be taken to the next sign-up page. 5. Create a [employer] ID. This will be your [employer] login ID and cannot be changed, so think of one that is secure and easy to remember. 6. Create a [employer] password. You can change your password at any time. 7. Enter your Password Reset Question and Answer. This can be used at a later time if you forget your password. 8. Enter your e-mail address. You will receive e-mail notification when new information is available in [address] 9. Click Sign Up. You can now view and download portions of your medical record. 10. Click the Download Summary menu link to download a portable copy of your medical information. If you have questions, please visit the Frequently Asked Questions section of the [employer] website. Remember, [employer] is NOT to be used for urgent needs. For medical emergencies, dial 911. Now available from your iPhone and Android! Please provide this summary of care documentation to your next provider. Your primary care clinician is listed as Harmony Perdomo. If you have any questions after today's visit, please call [phone].

## 2017-06-23 NOTE — PROGRESS NOTES
[address] [address] [facility] [place]   [phone]     6/23/2017  Chief Complaint   Patient presents with    Follow-up     HTN       HPI  Ms. Mihaela Franklin is a 80 y.o. female presents in routine follow up. She does report intermittent fluttering sensation in the LUQ. Began about 1 month ago. Occurs intermittently. Denies Chest pain, SOB or abdominal pain with occurrence. Lasts a few seconds. No symptoms noted today. Edema- minimal improvement with use of metolazone. Past Medical History:   Diagnosis Date    Adjustment disorder with depressed mood 2009    Allergic rhinitis due to other allergen 2011    Anemia, unspecified 2009    COPD     Coronary atherosclerosis of native coronary artery     Essential hypertension, benign 2008    GERD (gastroesophageal reflux disease)     Gout, unspecified 2008    Heart failure (HCC)     hx of, asymptomic now    Impaired glucose tolerance test 2012    Morbid obesity with BMI of 45.0-49.9, adult ([facility] 1/23/2015    Osteoporosis, unspecified 2009    Other and unspecified hyperlipidemia 2008    Unspecified hereditary and idiopathic peripheral neuropathy 2010    Unspecified vitamin D deficiency 2009       Allergies   Allergen Reactions    Tramadol Hives and Rash    Pcn [Penicillins] Rash and Nausea and Vomiting    Shellfish Containing Products Shortness of Breath    Statins-Hmg-Coa Reductase Inhibitors Myalgia       Current Outpatient Prescriptions on File Prior to Visit   Medication Sig Dispense Refill    metoprolol tartrate (LOPRESSOR) 50 mg tablet Take 1 Tab by mouth two (2) times a day. 180 Tab 3    dilTIAZem CD (CARTIA XT) 120 mg ER capsule TAKE 1 CAPSULE EVERY DAY 90 Cap 1    atorvastatin (LIPITOR) 20 mg tablet TAKE 1 TABLET EVERY DAY 90 Tab 2    metoprolol succinate (TOPROL-XL) 100 mg tablet Take 0.5 Tabs by mouth daily.  90 Tab 3    warfarin (COUMADIN) 5 mg tablet 5 mg Monday Wednesday Friday 7.5 mg rest the week (Patient taking differently: Takes 1 tab 5 mg daily except 1 1/2 tabs Tuesday and Thursday.) 135 Tab 1    gabapentin (NEURONTIN) 300 mg capsule Take 1 tablet nightly for 3 weeks then increase 2 tablets. Indications: NEUROPATHIC PAIN 60 Cap 6    allopurinol (ZYLOPRIM) 100 mg tablet Take 2 Tabs by mouth daily. Indications: GOUT 90 Tab 1    esomeprazole (NEXIUM) 20 mg capsule Take 1 Cap by mouth daily. Indications: GASTROESOPHAGEAL REFLUX 90 Cap 1    triamcinolone acetonide (KENALOG) 0.1 % ointment Apply  to affected area two (2) times a day. use thin layer 30 g 3    ALPRAZolam (XANAX) 0.5 mg tablet Take 1 Tab by mouth two (2) times daily as needed for Anxiety or Sleep. Max Daily Amount: 1 mg. Indications: ANXIETY 45 Tab 1    famotidine (PEPCID) 40 mg tablet TAKE 1 TAB BY MOUTH DAILY FOR GASTROESOPHAGEAL REFLUX 90 Tab 3    theophylline ER,12 hour, (THEOCHRON) 450 mg tablet TAKE 1 TALETB BY MOUTH DAILY. YOU NEED BLOOD WORK FOR THIS MEDICATION 90 Tab 1    nystatin (MYCOSTATIN) powder Apply  to affected area three (3) times daily. 60 g 3    aspirin 81 mg chewable tablet Take 81 mg by mouth daily. 4 Tablets Daily.  UBIDECARENONE/VITAMIN E MIXED (COQ10  PO) Take 100 mg by mouth daily.  arformoterol (BROVANA) 15 mcg/2 mL nebu neb solution 15 mcg by Nebulization route two (2) times a day.  budesonide (PULMICORT) 0.25 mg/2 mL nbsp by Nebulization route two (2) times a day.  isosorbide mononitrate ER (IMDUR) 30 mg tablet TAKE 1 TABLET EVERY DAY 90 Tab 3    ferrous sulfate 325 mg (65 mg iron) tablet Take 1 Tab by mouth Daily (before breakfast). Indications: IRON DEFICIENCY ANEMIA 90 Tab 1    albuterol-ipratropium (DUO-NEB) 2.5 mg-0.5 mg/3 ml nebu 3 mL by Nebulization route every six (6) hours as needed. 90 mL 2    fluticasone-vilanterol (BREO ELLIPTA) 100-25 mcg/dose inhaler Take 1 Puff by inhalation daily.  Indications: BRONCHOSPASM PREVENTION WITH COPD 3 Inhaler 1    Blood-Glucose Meter (FREESTYLE LITE METER) monitoring kit Dx E 11.5 1 Kit 0    glucose blood VI test strips (ASCENSIA AUTODISC VI, ONE TOUCH ULTRA TEST VI) strip Test 1 x per day 100 Strip 3    OT ULTRA/FASTTRACK CONTROL soln Dx E11.5 2 Bottle 4    OXYGEN-AIR DELIVERY SYSTEMS Take 2 L by inhalation continuous.  ipratropium-albuterol (COMBIVENT)  mcg/actuation inhaler One actuation QID 3 Inhaler 3    cholecalciferol (VITAMIN D3) 1,000 unit tablet Take 1 Tab by mouth daily. (Patient taking differently: Take 2,000 Units by mouth daily. Indications: VITAMIN D DEFICIENCY) 30 Tab 7    nitroglycerin (NITROSTAT) 0.4 mg SL tablet 0.4 mg by SubLINGual route every five (5) minutes as needed. No current facility-administered medications on file prior to visit. Visit Vitals    /68 (BP 1 Location: Left arm, BP Patient Position: Sitting)    Pulse 74    Temp 97.7 °F (36.5 °C) (Temporal)    Resp 20    Ht 4' 10.25\" (1.48 m)    Wt 211 lb 9.6 oz (96 kg)    SpO2 91%    BMI 43.85 kg/m2     Physical Exam   Cardiovascular: Normal heart sounds. Pulmonary/Chest: Effort normal. She has decreased breath sounds in the right lower field and the left lower field. She has no wheezes. Abdominal: Soft. There is no tenderness. Musculoskeletal:        Right lower leg: She exhibits edema (1+). She exhibits no tenderness. Left lower leg: She exhibits edema (2+ ). She exhibits no tenderness. Vitals reviewed. ICD-10-CM ICD-9-CM    1. Chronic congestive heart failure, unspecified congestive heart failure type (HCC) I50.9 428.0 metOLazone (ZAROXOLYN) 2.5 mg tablet      bumetanide (BUMEX) 1 mg tablet      COLLECTION CAPILLARY BLOOD SPECIMEN   2. Bilateral edema of lower extremity R60.0 782.3 metOLazone (ZAROXOLYN) 2.5 mg tablet      bumetanide (BUMEX) 1 mg tablet      COLLECTION CAPILLARY BLOOD SPECIMEN   3. CKD (chronic kidney disease) stage 3, GFR 30-59 ml/min N18.3 585. 3 METABOLIC PANEL, BASIC      COLLECTION CAPILLARY BLOOD SPECIMEN     Increase metolazone to 5 mg every other day and bumex to 2 tablets daily. Monitor her weight daily and hold metolazone if > 2 lb weight loss. Warfarin was adjusted yesterday based upon low INR, she will repeat in one week. Monitor LUQ fluttering sensation. F/u should symptoms persist or worsen       Follow-up Disposition:  Return in about 3 months (around 9/23/2017).       Irma Mills PA-C, MHP

## 2017-06-23 NOTE — TELEPHONE ENCOUNTER
Spoke with patient increased her coumadin. Recheck in 1 week.   Update located on current medication list.

## 2017-06-24 LAB
BUN SERPL-MCNC: 34 MG/DL (ref 8–27)
BUN/CREAT SERPL: 25 (ref 12–28)
CALCIUM SERPL-MCNC: 10.1 MG/DL (ref 8.7–10.3)
CHLORIDE SERPL-SCNC: 96 MMOL/L (ref 96–106)
CO2 SERPL-SCNC: 27 MMOL/L (ref 18–29)
CREAT SERPL-MCNC: 1.37 MG/DL (ref 0.57–1)
GLUCOSE SERPL-MCNC: 104 MG/DL (ref 65–99)
INTERPRETATION: NORMAL
POTASSIUM SERPL-SCNC: 5.1 MMOL/L (ref 3.5–5.2)
SODIUM SERPL-SCNC: 142 MMOL/L (ref 134–144)

## 2017-07-19 RX ORDER — THEOPHYLLINE 450 MG/1
TABLET, EXTENDED RELEASE ORAL
Qty: 90 TAB | Refills: 3 | Status: SHIPPED | OUTPATIENT
Start: 2017-07-19 | End: 2018-01-01 | Stop reason: ALTCHOICE

## 2017-07-19 RX ORDER — ISOSORBIDE MONONITRATE 30 MG/1
TABLET, EXTENDED RELEASE ORAL
Qty: 90 TAB | Refills: 3 | Status: SHIPPED | OUTPATIENT
Start: 2017-07-19 | End: 2018-01-01 | Stop reason: SDUPTHER

## 2017-07-20 LAB — INR, EXTERNAL: 2.1

## 2017-07-27 LAB — INR, EXTERNAL: 2.5

## 2017-07-28 ENCOUNTER — TELEPHONE (OUTPATIENT)
Dept: FAMILY MEDICINE CLINIC | Age: 72
End: 2017-07-28

## 2017-07-28 DIAGNOSIS — R60.0 BILATERAL EDEMA OF LOWER EXTREMITY: ICD-10-CM

## 2017-07-28 DIAGNOSIS — I50.9 CHRONIC CONGESTIVE HEART FAILURE, UNSPECIFIED CONGESTIVE HEART FAILURE TYPE: ICD-10-CM

## 2017-07-28 RX ORDER — METOLAZONE 2.5 MG/1
5 TABLET ORAL DAILY
Qty: 45 TAB | Refills: 1
Start: 2017-07-28 | End: 2017-08-09 | Stop reason: ALTCHOICE

## 2017-07-28 NOTE — TELEPHONE ENCOUNTER
Component      Latest Ref Rng & Units 4/21/2017 4/4/2017 12/9/2013          11:21 AM 12:17 PM  9:41 AM   Uric acid      2.5 - 7.1 mg/dL 9.4 (H) 13.4 (H) 5.4       Talked with Mrs. Dylan Espinoza    She is taking the allopurinol   She reports the fluid pill is not working. Taking the metolazone every other day    Discussed increasing to 2 tabs daily.     Schedule an appt in Sept for follow up or sooner if needed

## 2017-08-03 LAB — INR, EXTERNAL: 1.6

## 2017-08-04 ENCOUNTER — TELEPHONE (OUTPATIENT)
Dept: FAMILY MEDICINE CLINIC | Age: 72
End: 2017-08-04

## 2017-08-04 ENCOUNTER — TELEPHONE ANTICOAG (OUTPATIENT)
Dept: FAMILY MEDICINE CLINIC | Age: 72
End: 2017-08-04

## 2017-08-04 LAB — INR, EXTERNAL: 1.6

## 2017-08-04 NOTE — TELEPHONE ENCOUNTER
Spoke with patient    Reports no changes in medication  States she may not have eaten as many greens in the past week. She has been very stable over the 2 months.   Continue with current regimen and repeat INR next Wednesday

## 2017-08-09 ENCOUNTER — OFFICE VISIT (OUTPATIENT)
Dept: FAMILY MEDICINE CLINIC | Age: 72
End: 2017-08-09

## 2017-08-09 VITALS
HEART RATE: 81 BPM | RESPIRATION RATE: 26 BRPM | SYSTOLIC BLOOD PRESSURE: 112 MMHG | DIASTOLIC BLOOD PRESSURE: 70 MMHG | OXYGEN SATURATION: 97 % | TEMPERATURE: 96.8 F | BODY MASS INDEX: 42.84 KG/M2 | WEIGHT: 204.1 LBS | HEIGHT: 58 IN

## 2017-08-09 DIAGNOSIS — L85.3 DRY SKIN DERMATITIS: ICD-10-CM

## 2017-08-09 DIAGNOSIS — I50.9 CHRONIC CONGESTIVE HEART FAILURE, UNSPECIFIED CONGESTIVE HEART FAILURE TYPE: ICD-10-CM

## 2017-08-09 DIAGNOSIS — R60.0 BILATERAL EDEMA OF LOWER EXTREMITY: ICD-10-CM

## 2017-08-09 DIAGNOSIS — Z79.01 LONG TERM CURRENT USE OF ANTICOAGULANT: ICD-10-CM

## 2017-08-09 DIAGNOSIS — I48.0 PAROXYSMAL ATRIAL FIBRILLATION (HCC): Primary | ICD-10-CM

## 2017-08-09 LAB
INR BLD: 1.5
PT POC: 17.7 SECONDS
VALID INTERNAL CONTROL?: YES

## 2017-08-09 RX ORDER — BUMETANIDE 1 MG/1
2 TABLET ORAL 2 TIMES DAILY
Qty: 240 TAB | Refills: 4 | Status: SHIPPED | OUTPATIENT
Start: 2017-08-09 | End: 2018-01-01 | Stop reason: SDUPTHER

## 2017-08-09 RX ORDER — WARFARIN SODIUM 5 MG/1
TABLET ORAL
Qty: 135 TAB | Refills: 1
Start: 2017-08-09 | End: 2017-11-02 | Stop reason: DRUGHIGH

## 2017-08-09 RX ORDER — METOLAZONE 2.5 MG/1
TABLET ORAL
Qty: 240 TAB | Refills: 5 | Status: SHIPPED | OUTPATIENT
Start: 2017-08-09 | End: 2018-01-01 | Stop reason: ALTCHOICE

## 2017-08-09 NOTE — PROGRESS NOTES
[address], Count includes the [facility] [place]   [phone]     8/9/2017  Chief Complaint   Patient presents with    Allergic Reaction     rash    Anticoagulation       HPI  Ms. Dylan Espinoza is a 80 y.o. female     Edema- has had some improvement in the swelling of her legs and feet with the increased dosing. She has however cut back to daily as the swelling has improved           Past Medical History:   Diagnosis Date    Adjustment disorder with depressed mood 2009    Allergic rhinitis due to other allergen 2011    Anemia, unspecified 2009    COPD     Coronary atherosclerosis of native coronary artery     Essential hypertension, benign 2008    GERD (gastroesophageal reflux disease)     Gout, unspecified 2008    Heart failure ([facility]     hx of, asymptomic now    Impaired glucose tolerance test 2012    Morbid obesity with BMI of 45.0-49.9, adult ([facility] 1/23/2015    Osteoporosis, unspecified 2009    Other and unspecified hyperlipidemia 2008    Unspecified hereditary and idiopathic peripheral neuropathy 2010    Unspecified vitamin D deficiency 2009       Allergies   Allergen Reactions    Tramadol Hives and Rash    Pcn [Penicillins] Rash and Nausea and Vomiting    Shellfish Containing Products Shortness of Breath    Statins-Hmg-Coa Reductase Inhibitors Myalgia       Current Outpatient Prescriptions on File Prior to Visit   Medication Sig Dispense Refill    isosorbide mononitrate ER (IMDUR) 30 mg tablet TAKE 1 TABLET EVERY DAY 90 Tab 3    theophylline ER,12 hour, (THEOCHRON) 450 mg tablet TAKE 1 TALETB BY MOUTH DAILY. YOU NEED BLOOD WORK FOR THIS MEDICATION 90 Tab 3    metoprolol tartrate (LOPRESSOR) 50 mg tablet Take 1 Tab by mouth two (2) times a day.  180 Tab 3    dilTIAZem CD (CARTIA XT) 120 mg ER capsule TAKE 1 CAPSULE EVERY DAY 90 Cap 1    atorvastatin (LIPITOR) 20 mg tablet TAKE 1 TABLET EVERY DAY 90 Tab 2    gabapentin (NEURONTIN) 300 mg capsule Take 1 tablet nightly for 3 weeks then increase 2 tablets. Indications: NEUROPATHIC PAIN 60 Cap 6    allopurinol (ZYLOPRIM) 100 mg tablet Take 2 Tabs by mouth daily. Indications: GOUT 90 Tab 1    esomeprazole (NEXIUM) 20 mg capsule Take 1 Cap by mouth daily. Indications: GASTROESOPHAGEAL REFLUX 90 Cap 1    triamcinolone acetonide (KENALOG) 0.1 % ointment Apply  to affected area two (2) times a day. use thin layer 30 g 3    ALPRAZolam (XANAX) 0.5 mg tablet Take 1 Tab by mouth two (2) times daily as needed for Anxiety or Sleep. Max Daily Amount: 1 mg. Indications: ANXIETY 45 Tab 1    famotidine (PEPCID) 40 mg tablet TAKE 1 TAB BY MOUTH DAILY FOR GASTROESOPHAGEAL REFLUX 90 Tab 3    aspirin 81 mg chewable tablet Take 81 mg by mouth daily. 4 Tablets Daily.  albuterol-ipratropium (DUO-NEB) 2.5 mg-0.5 mg/3 ml nebu 3 mL by Nebulization route every six (6) hours as needed. 90 mL 2    OXYGEN-AIR DELIVERY SYSTEMS Take 2 L by inhalation continuous.  cholecalciferol (VITAMIN D3) 1,000 unit tablet Take 1 Tab by mouth daily. (Patient taking differently: Take 2,000 Units by mouth daily. Indications: VITAMIN D DEFICIENCY) 30 Tab 7    nitroglycerin (NITROSTAT) 0.4 mg SL tablet 0.4 mg by SubLINGual route every five (5) minutes as needed.  nystatin (MYCOSTATIN) powder Apply  to affected area three (3) times daily. 60 g 3     No current facility-administered medications on file prior to visit.         Lab Results   Component Value Date/Time    Sodium 142 06/23/2017 11:59 AM    Potassium 5.1 06/23/2017 11:59 AM    Chloride 96 06/23/2017 11:59 AM    CO2 27 06/23/2017 11:59 AM    Anion gap 7 08/18/2012 04:00 AM    Glucose 104 06/23/2017 11:59 AM    BUN 34 06/23/2017 11:59 AM    Creatinine 1.37 06/23/2017 11:59 AM    BUN/Creatinine ratio 25 06/23/2017 11:59 AM    GFR est AA 45 06/23/2017 11:59 AM    GFR est non-AA 39 06/23/2017 11:59 AM    Calcium 10.1 06/23/2017 11:59 AM    Bilirubin, total 0.4 04/21/2017 11:21 AM AST (SGOT) 11 04/21/2017 11:21 AM    Alk. phosphatase 131 04/21/2017 11:21 AM    Protein, total 6.9 04/21/2017 11:21 AM    Albumin 4.2 04/21/2017 11:21 AM    A-G Ratio 1.6 04/21/2017 11:21 AM    ALT (SGPT) 10 04/21/2017 11:21 AM     Results for orders placed or performed in visit on 08/09/17   AMB POC PT/INR   Result Value Ref Range    VALID INTERNAL CONTROL POC Yes     Prothrombin time (POC) 17.7 seconds    INR POC 1.5          Visit Vitals    /70 (BP 1 Location: Left arm, BP Patient Position: Sitting)    Pulse 81    Temp 96.8 °F (36 °C) (Temporal)    Resp 26    Ht 4' 10.25\" (1.48 m)    Wt 204 lb 1.6 oz (92.6 kg)    SpO2 97%    BMI 42.29 kg/m2     Physical Exam   Musculoskeletal:        Right lower leg: She exhibits edema. Left lower leg: She exhibits edema. Skin: Skin is dry. Rash noted. Rash is maculopapular (lower extremities; anterior ). No erythema. Vitals reviewed. ICD-10-CM ICD-9-CM    1. Paroxysmal atrial fibrillation (HCC) I48.0 427.31 COLLECTION CAPILLARY BLOOD SPECIMEN      AMB POC PT/INR   2. Chronic congestive heart failure, unspecified congestive heart failure type (HCC) I50.9 428.0 bumetanide (BUMEX) 1 mg tablet      metOLazone (ZAROXOLYN) 2.5 mg tablet   3. Bilateral edema of lower extremity R60.0 782.3 bumetanide (BUMEX) 1 mg tablet      metOLazone (ZAROXOLYN) 2.5 mg tablet   4. Long term current use of anticoagulant Z79.01 V58.61 warfarin (COUMADIN) 5 mg tablet   5. Dry skin dermatitis L85.3 692.89      Adjusted the diuretic dosing as noted  Dry skin- continue with current regimen. Advised to use aloe with coconut oil or olive oil for hydrating the skin daily. Follow-up Disposition:  Return in about 3 months (around 11/9/2017), or if symptoms worsen or fail to improve.       Mau Garrison PA-C, MHP

## 2017-08-09 NOTE — MR AVS SNAPSHOT
Visit Information Date & Time Provider Department Dept. Phone Encounter #  
 8/9/2017  4:00 PM ROLY Agee [facility] Mack Huffman [address] [phone] [id number] Your Appointments 9/1/2017 11:00 AM  
ESTABLISHED PATIENT with Roman Moyer MD  
[address] [facility]) Appt Note: 6 mth fu  $0 cp  
 [address] [id number] [phone]  
  
   
 [address] [address]  
  
    
 9/22/2017 10:30 AM  
ESTABLISHED PATIENT with ROLY Agee  
[facility] ([facility] Appt Note: check up/labs [address] [address]  
[address] [facility] [address] [address] Upcoming Health Maintenance Date Due Hepatitis C Screening 1945 FOOT EXAM Q1 11/13/1956 DTaP/Tdap/Td series (1 - Tdap) 11/13/1967 FOBT Q 1 YEAR AGE 50-75 11/13/1996 EYE EXAM RETINAL OR DILATED Q1 12/9/2014 GLAUCOMA SCREENING Q2Y 12/9/2015 INFLUENZA AGE 9 TO ADULT 8/1/2017 MICROALBUMIN Q1 9/13/2017 HEMOGLOBIN A1C Q6M 10/21/2017 LIPID PANEL Q1 4/21/2018 MEDICARE YEARLY EXAM 4/22/2018 BREAST CANCER SCRN MAMMOGRAM 3/21/2019 Allergies as of 8/9/2017  Review Complete On: 8/9/2017 By: ROLY Agee Severity Noted Reaction Type Reactions Tramadol Medium 07/29/2013    Hives, Rash Pcn [Penicillins]  10/05/2015    Rash, Nausea and Vomiting Shellfish Containing Products  08/17/2012    Shortness of Breath Statins-hmg-coa Reductase Inhibitors  01/27/2015   Side Effect Myalgia Current Immunizations  Reviewed on 4/21/2017 Name Date Influenza High Dose Vaccine PF 9/26/2016, 11/17/2015 Influenza Vaccine 11/8/2013  1:28 PM  
 Pneumococcal Conjugate (PCV-13) 12/20/2015  Pneumococcal Polysaccharide (PPSV-23) 11/8/2013  1:30 PM  
 Zoster Vaccine, Live 1/1/2010 Not reviewed this visit You Were Diagnosed With   
  
 Codes Comments Paroxysmal atrial fibrillation (HCC)    -  Primary ICD-10-CM: I48.0 ICD-9-CM: 427.31 Chronic congestive heart failure, unspecified congestive heart failure type ([facility]     ICD-10-CM: I50.9 ICD-9-CM: 428.0 Bilateral edema of lower extremity     ICD-10-CM: R60.0 ICD-9-CM: 782.3 Long term current use of anticoagulant     ICD-10-CM: Z79.01 
ICD-9-CM: V58.61 Vitals BP Pulse Temp Resp Height(growth percentile) Weight(growth percentile) 112/70 (BP 1 Location: Left arm, BP Patient Position: Sitting) 81 96.8 °F (36 °C) (Temporal) 26 4' 10.25\" (1.48 m) 204 lb 1.6 oz (92.6 kg) SpO2 BMI OB Status Smoking Status 97% 42.29 kg/m2 Hysterectomy Former Smoker Vitals History BMI and BSA Data Body Mass Index Body Surface Area  
 42.29 kg/m 2 1.95 m 2 Preferred Pharmacy Pharmacy Name Phone [address]  [facility] [address] [phone] Your Updated Medication List  
  
   
This list is accurate as of: 8/9/17  4:38 PM.  Always use your most recent med list.  
  
  
  
  
 albuterol-ipratropium 2.5 mg-0.5 mg/3 ml Nebu Commonly known as:  DUO-NEB  
3 mL by Nebulization route every six (6) hours as needed. allopurinol 100 mg tablet Commonly known as:  Karn Souza Take 2 Tabs by mouth daily. Indications: GOUT  
  
 ALPRAZolam 0.5 mg tablet Commonly known as:  Johnice Salts Take 1 Tab by mouth two (2) times daily as needed for Anxiety or Sleep. Max Daily Amount: 1 mg. Indications: ANXIETY  
  
 aspirin 81 mg chewable tablet Take 81 mg by mouth daily. 4 Tablets Daily. atorvastatin 20 mg tablet Commonly known as:  LIPITOR  
TAKE 1 TABLET EVERY DAY  
  
 bumetanide 1 mg tablet Commonly known as:  Alexandr Patron Take 2 Tabs by mouth two (2) times a day. Indications: Edema  
  
 cholecalciferol 1,000 unit tablet Commonly known as:  VITAMIN D3 Take 1 Tab by mouth daily. dilTIAZem  mg ER capsule Commonly known as:  CARTIA XT  
TAKE 1 CAPSULE EVERY DAY  
  
 esomeprazole 20 mg capsule Commonly known as:  NexIUM Take 1 Cap by mouth daily. Indications: GASTROESOPHAGEAL REFLUX  
  
 famotidine 40 mg tablet Commonly known as:  PEPCID  
TAKE 1 TAB BY MOUTH DAILY FOR GASTROESOPHAGEAL REFLUX  
  
 gabapentin 300 mg capsule Commonly known as:  NEURONTIN Take 1 tablet nightly for 3 weeks then increase 2 tablets. Indications: NEUROPATHIC PAIN  
  
 isosorbide mononitrate ER 30 mg tablet Commonly known as:  IMDUR  
TAKE 1 TABLET EVERY DAY  
  
 metOLazone 2.5 mg tablet Commonly known as:  ZAROXOLYN  
2 tablets BID as needed for edema then maintenance dose of 2 tablets daily  Indications: PERIPHERAL EDEMA DUE TO CHRONIC HEART FAILURE  
  
 metoprolol tartrate 50 mg tablet Commonly known as:  LOPRESSOR Take 1 Tab by mouth two (2) times a day. NITROSTAT 0.4 mg SL tablet Generic drug:  nitroglycerin 0.4 mg by SubLINGual route every five (5) minutes as needed. nystatin powder Commonly known as:  MYCOSTATIN Apply  to affected area three (3) times daily. OXYGEN-AIR DELIVERY SYSTEMS Take 2 L by inhalation continuous. theophylline ER(12 hour) 450 mg tablet Commonly known as:  THEOCHRON  
TAKE 1 TALETB BY MOUTH DAILY. YOU NEED BLOOD WORK FOR THIS MEDICATION  
  
 triamcinolone acetonide 0.1 % ointment Commonly known as:  KENALOG Apply  to affected area two (2) times a day. use thin layer  
  
 warfarin 5 mg tablet Commonly known as:  COUMADIN  
5 mg Monday Wednesday Friday 7.5 mg rest the week Prescriptions Sent to Pharmacy Refills  
 bumetanide (BUMEX) 1 mg tablet 4 Sig: Take 2 Tabs by mouth two (2) times a day. Indications: Edema Class: Normal  
 Pharmacy: [address] [place] [address], [address] [place] [address] Street Ph #: [phone] Route: Oral  
 metOLazone (ZAROXOLYN) 2.5 mg tablet 5 Si tablets BID as needed for edema then maintenance dose of 2 tablets daily  Indications: PERIPHERAL EDEMA DUE TO CHRONIC HEART FAILURE Class: Normal  
 Pharmacy: [address] [place] [address], [facility] [address] [place] [address] #: [phone] 2017 Details Sun Mon Tue Wed Thu Fri Sat  
    1  
  
  
  
   2  
  
  
  
   3  
  
  
  
   4  
  
  
  
   5  
  
  
  
  
  6  
  
  
  
   7  
  
  
  
   8  
  
  
  
   9  
  
7.5 mg  
See details 10  
  
7.5 mg  
  
   11  
  
5 mg  
  
   12  
  
7.5 mg  
  
  
  13  
  
5 mg  
  
   14  
  
5 mg  
  
   15  
  
7.5 mg  
  
   16  
  
7.5 mg  
  
   17  
  
7.5 mg  
  
   18  
  
  
  
   19  
  
  
  
  
  20  
  
  
  
   21  
  
  
  
   22  
  
  
  
   23  
  
  
  
   24  
  
  
  
   25  
  
  
  
   26  
  
  
  
  
  27  
  
  
  
   28  
  
  
  
   29  
  
  
  
   30  
  
  
  
   31  
  
  
  
    
 Date Details  This INR check INR: 1.5 Date of next INR:  2017 How to take your warfarin dose To take:  5 mg Take one of the 5 mg tablets. To take:  7.5 mg Take one and a half of the 5 mg tablets. We Performed the Following AMB POC PT/INR [14278 CPT(R)] COLLECTION CAPILLARY BLOOD SPECIMEN [86009 CPT(R)] Introducing [facility] [place] [facility] SERVICES! [facility] introduces [employer] patient portal. Now you can access parts of your medical record, email your doctor's office, and request medication refills online. 1. In your internet browser, go to [email or web address] 2. Click on the First Time User? Click Here link in the Sign In box. You will see the New Member Sign Up page. 3. Enter your [employer] Access Code exactly as it appears below. You will not need to use this code after youve completed the sign-up process. If you do not sign up before the expiration date, you must request a new code. · [email or web address] Access Code: [email or web address] Expires: 11/2/2017 10:29 AM 
 
4. Enter the last four digits of your Social Security Number (xxxx) and Date of Birth (mm/dd/yyyy) as indicated and click Submit. You will be taken to the next sign-up page. 5. Create a [email or web address] ID. This will be your [email or web address] login ID and cannot be changed, so think of one that is secure and easy to remember. 6. Create a [email or web address] password. You can change your password at any time. 7. Enter your Password Reset Question and Answer. This can be used at a later time if you forget your password. 8. Enter your e-mail address. You will receive e-mail notification when new information is available in [address] 9. Click Sign Up. You can now view and download portions of your medical record. 10. Click the Download Summary menu link to download a portable copy of your medical information. If you have questions, please visit the Frequently Asked Questions section of the [email or web address] website. Remember, [email or web address] is NOT to be used for urgent needs. For medical emergencies, dial 911. Now available from your iPhone and Android! Please provide this summary of care documentation to your next provider. Your primary care clinician is listed as Trudy Cardona. If you have any questions after today's visit, please call [phone].

## 2017-08-11 ENCOUNTER — TELEPHONE (OUTPATIENT)
Dept: FAMILY MEDICINE CLINIC | Age: 72
End: 2017-08-11

## 2017-08-17 ENCOUNTER — TELEPHONE (OUTPATIENT)
Dept: FAMILY MEDICINE CLINIC | Age: 72
End: 2017-08-17

## 2017-08-24 ENCOUNTER — TELEPHONE (OUTPATIENT)
Dept: FAMILY MEDICINE CLINIC | Age: 72
End: 2017-08-24

## 2017-08-24 NOTE — TELEPHONE ENCOUNTER
Mrs. Feliz Lopez has not heard form the [employer] people. She was told by Charmaine Kohler to call us back if she had not heard form them.

## 2017-09-01 ENCOUNTER — OFFICE VISIT (OUTPATIENT)
Dept: CARDIOLOGY CLINIC | Age: 72
End: 2017-09-01

## 2017-09-01 VITALS
SYSTOLIC BLOOD PRESSURE: 132 MMHG | HEART RATE: 73 BPM | BODY MASS INDEX: 42.23 KG/M2 | WEIGHT: 201.2 LBS | RESPIRATION RATE: 24 BRPM | DIASTOLIC BLOOD PRESSURE: 70 MMHG | OXYGEN SATURATION: 93 % | HEIGHT: 58 IN

## 2017-09-01 DIAGNOSIS — I10 ESSENTIAL HYPERTENSION, BENIGN: ICD-10-CM

## 2017-09-01 DIAGNOSIS — Z99.89 OSA ON CPAP: ICD-10-CM

## 2017-09-01 DIAGNOSIS — G47.33 OSA ON CPAP: ICD-10-CM

## 2017-09-01 DIAGNOSIS — I25.10 ATHEROSCLEROSIS OF NATIVE CORONARY ARTERY OF NATIVE HEART WITHOUT ANGINA PECTORIS: Primary | ICD-10-CM

## 2017-09-01 DIAGNOSIS — I50.32 CHRONIC DIASTOLIC HEART FAILURE (HCC): ICD-10-CM

## 2017-09-01 DIAGNOSIS — E78.00 PURE HYPERCHOLESTEROLEMIA: ICD-10-CM

## 2017-09-01 DIAGNOSIS — J42 CHRONIC BRONCHITIS, UNSPECIFIED CHRONIC BRONCHITIS TYPE (HCC): ICD-10-CM

## 2017-09-01 DIAGNOSIS — I48.0 PAROXYSMAL ATRIAL FIBRILLATION (HCC): ICD-10-CM

## 2017-09-01 NOTE — PROGRESS NOTES
Reviewed record In preparation for visit and have obtained necessary documentation. Pt verified with 2 identifiers. Medications reviewed. .    Pt reports not taking ASA 81 and Melatozone.

## 2017-09-01 NOTE — PROGRESS NOTES
Genny Tripp is a 80 y.o. female is here for routine f/u. Mild stable PHAM. Stable CV sx. No CP, no afib episodes. Continues to see PCP. The patient denies chest pain/ shortness of breath, orthopnea, PND, LE edema, palpitations, syncope, presyncope or fatigue.        Patient Active Problem List    Diagnosis Date Noted    Chronic gout of multiple sites 04/21/2017    CKD (chronic kidney disease) stage 3, GFR 30-59 ml/min 04/21/2017    Dry skin dermatitis 07/22/2016    CYRIL on CPAP 05/03/2016    Pruritic dermatitis 04/15/2016    Bronchitis 10/01/2015    Long term current use of anticoagulant 08/25/2015    Paroxysmal atrial fibrillation ([facility] 06/22/2015    Heart failure ([facility]     Coronary atherosclerosis of native coronary artery     Morbid obesity with BMI of 45.0-49.9, adult ([facility] 01/23/2015    GERD (gastroesophageal reflux disease)     Essential hypertension, benign     Hyperlipidemia     Gout     Osteoporosis     Anemia     COPD (chronic obstructive pulmonary disease) ([facility] 08/18/2012      ROLY Greene  Past Medical History:   Diagnosis Date    Adjustment disorder with depressed mood 2009    Allergic rhinitis due to other allergen 2011    Anemia, unspecified 2009    COPD     Coronary atherosclerosis of native coronary artery     Essential hypertension, benign 2008    GERD (gastroesophageal reflux disease)     Gout, unspecified 2008    Heart failure ([facility]     hx of, asymptomic now    Impaired glucose tolerance test 2012    Morbid obesity with BMI of 45.0-49.9, adult ([facility] 1/23/2015    Osteoporosis, unspecified 2009    Other and unspecified hyperlipidemia 2008    Unspecified hereditary and idiopathic peripheral neuropathy 2010    Unspecified vitamin D deficiency 2009      Past Surgical History:   Procedure Laterality Date    HX APPENDECTOMY      HX COLONOSCOPY      HX CORONARY STENT PLACEMENT  2000    LCx    HX HEART CATHETERIZATION  2000    HX HEART CATHETERIZATION  2012    LVEWDP 11, PCWP 11, Inf Hypo EF 45%, LAD 30%, mRCA 100% R to R and L to R collaterals    HX PTCA  2012    Unsuccessful attempt to opne RCA    HX ROBERT AND BSO       Allergies   Allergen Reactions    Tramadol Hives and Rash    Pcn [Penicillins] Rash and Nausea and Vomiting    Shellfish Containing Products Shortness of Breath    Statins-Hmg-Coa Reductase Inhibitors Myalgia      Family History   Problem Relation Age of Onset    Arthritis-osteo Mother     Diabetes Mother     Heart Disease Mother     Lung Disease Mother     Hypertension Sister     Asthma Sister     Asthma Brother     Heart Disease Maternal Grandmother     No Known Problems Brother     No Known Problems Sister     Kidney Disease Daughter     Lupus Daughter       Social History     Social History    Marital status:      Spouse name: N/A    Number of children: N/A    Years of education: N/A     Occupational History    Not on file. Social History Main Topics    Smoking status: Former Smoker     Packs/day: 1.00     Years: 40.00     Quit date: 12/1/2013    Smokeless tobacco: Not on file    Alcohol use No    Drug use: No    Sexual activity: Not on file     Other Topics Concern     Service No    Blood Transfusions Yes    Caffeine Concern No    Occupational Exposure No    Hobby Hazards No    Sleep Concern Yes    Stress Concern No    Weight Concern Yes    Special Diet No    Back Care Yes    Exercise No    Bike Helmet Yes    Seat Belt Yes    Self-Exams Yes     Social History Narrative      Current Outpatient Prescriptions   Medication Sig    bumetanide (BUMEX) 1 mg tablet Take 2 Tabs by mouth two (2) times a day.  Indications: Edema    warfarin (COUMADIN) 5 mg tablet 5 mg Monday Wednesday Friday 7.5 mg rest the week (Patient taking differently: T, W, Th, Sat 7.5 mg;  5 mg all other days)    isosorbide mononitrate ER (IMDUR) 30 mg tablet TAKE 1 TABLET EVERY DAY    theophylline ER,12 hour, (THEOCHRON) 450 mg tablet TAKE 1 TALETB BY MOUTH DAILY. YOU NEED BLOOD WORK FOR THIS MEDICATION    metoprolol tartrate (LOPRESSOR) 50 mg tablet Take 1 Tab by mouth two (2) times a day.  dilTIAZem CD (CARTIA XT) 120 mg ER capsule TAKE 1 CAPSULE EVERY DAY    atorvastatin (LIPITOR) 20 mg tablet TAKE 1 TABLET EVERY DAY    gabapentin (NEURONTIN) 300 mg capsule Take 1 tablet nightly for 3 weeks then increase 2 tablets. Indications: NEUROPATHIC PAIN    esomeprazole (NEXIUM) 20 mg capsule Take 1 Cap by mouth daily. Indications: GASTROESOPHAGEAL REFLUX    triamcinolone acetonide (KENALOG) 0.1 % ointment Apply  to affected area two (2) times a day. use thin layer    famotidine (PEPCID) 40 mg tablet TAKE 1 TAB BY MOUTH DAILY FOR GASTROESOPHAGEAL REFLUX    nystatin (MYCOSTATIN) powder Apply  to affected area three (3) times daily.  albuterol-ipratropium (DUO-NEB) 2.5 mg-0.5 mg/3 ml nebu 3 mL by Nebulization route every six (6) hours as needed.  OXYGEN-AIR DELIVERY SYSTEMS Take 2 L by inhalation continuous.  cholecalciferol (VITAMIN D3) 1,000 unit tablet Take 1 Tab by mouth daily. (Patient taking differently: Take 2,000 Units by mouth daily. Indications: VITAMIN D DEFICIENCY)    nitroglycerin (NITROSTAT) 0.4 mg SL tablet 0.4 mg by SubLINGual route every five (5) minutes as needed.  metOLazone (ZAROXOLYN) 2.5 mg tablet 2 tablets BID as needed for edema then maintenance dose of 2 tablets daily  Indications: PERIPHERAL EDEMA DUE TO CHRONIC HEART FAILURE    allopurinol (ZYLOPRIM) 100 mg tablet Take 2 Tabs by mouth daily. Indications: GOUT    ALPRAZolam (XANAX) 0.5 mg tablet Take 1 Tab by mouth two (2) times daily as needed for Anxiety or Sleep. Max Daily Amount: 1 mg. Indications: ANXIETY    aspirin 81 mg chewable tablet Take 81 mg by mouth daily. 4 Tablets Daily. No current facility-administered medications for this visit. Review of Symptoms:    CONST  No weight change.  No fever, chills, sweats    ENT No visual changes, URI sx, sore throat    CV  See HPI   RESP  No cough, or sputum, wheezing. Also see HPI   GI  No abdominal pain or change in bowel habits. No heartburn or dysphagia. No melena or rectal bleeding.   No dysuria, urgency, frequency, hematuria   MSKEL  No joint pain, swelling. No muscle pain. SKIN  No rash or lesions. NEURO  No headache, syncope, or seizure. No weakness, loss of sensation, or paresthesias. PSYCH  No low mood or depression  No anxiety. HE/LYMPH  No easy bruising, abnormal bleeding, or enlarged glands.         Physical ExamPhysical Exam:    Visit Vitals    /70 (BP 1 Location: Left arm, BP Patient Position: Sitting)    Pulse 73    Resp 24    Ht 4' 10\" (1.473 m)    Wt 201 lb 3.2 oz (91.3 kg)    SpO2 93%    BMI 42.05 kg/m2     Gen: NAD  HEENT:  PERRL, throat clear  Neck: no adenopathy, no thyromegaly, no JVD   Heart:  Regular,Nl S1S2,  II/VI murmur, no gallop or rub.   Lungs:  clear  Abdomen:   Soft, non-tender, bowel sounds are active.   Extremities:  No edema  Pulse: symmetric  Neuro: A&O times 3, No focal neuro deficits    Cardiographics    ECG: NSR, wnl    CARDIAC TESTING    [facility] MPI, 10/2014: Normal perfusion, EF 68%    Echo 7/15/15--normal LV size, LVEF 60-65, mild AS (32.13/16.89), mod LAE, MAC, trace MR, inadequate TR for PASP    Echo 7/27/16--mild LVH, LVEF 60-65, D1, mod LAE, mild AS (p18/m10.8), mod MAC, mild MR, mild MS, RVSP 46.      Labs:   Lab Results   Component Value Date/Time    Sodium 142 06/23/2017 11:59 AM    Sodium 140 04/21/2017 11:21 AM    Sodium 141 02/24/2017 10:00 AM    Sodium 144 09/13/2016 02:35 PM    Sodium 142 08/02/2016 01:50 PM    Potassium 5.1 06/23/2017 11:59 AM    Potassium 4.8 04/21/2017 11:21 AM    Potassium 4.2 02/24/2017 10:00 AM    Potassium 5.2 09/13/2016 02:35 PM    Potassium 5.1 08/02/2016 01:50 PM    Chloride 96 06/23/2017 11:59 AM    Chloride 96 04/21/2017 11:21 AM    Chloride 97 02/24/2017 10:00 AM    Chloride 102 09/13/2016 02:35 PM    Chloride 101 08/02/2016 01:50 PM    CO2 27 06/23/2017 11:59 AM    CO2 25 04/21/2017 11:21 AM    CO2 23 02/24/2017 10:00 AM    CO2 25 09/13/2016 02:35 PM    CO2 25 08/02/2016 01:50 PM    Anion gap 7 08/18/2012 04:00 AM    Glucose 104 06/23/2017 11:59 AM    Glucose 152 04/21/2017 11:21 AM    Glucose 135 02/24/2017 10:00 AM    Glucose 88 09/13/2016 02:35 PM    Glucose 101 08/02/2016 01:50 PM    BUN 34 06/23/2017 11:59 AM    BUN 59 04/21/2017 11:21 AM    BUN 43 02/24/2017 10:00 AM    BUN 52 09/13/2016 02:35 PM    BUN 43 08/02/2016 01:50 PM    Creatinine 1.37 06/23/2017 11:59 AM    Creatinine 1.54 04/21/2017 11:21 AM    Creatinine 1.24 02/24/2017 10:00 AM    Creatinine 1.38 09/13/2016 02:35 PM    Creatinine 1.19 08/02/2016 01:50 PM    BUN/Creatinine ratio 25 06/23/2017 11:59 AM    BUN/Creatinine ratio 38 04/21/2017 11:21 AM    BUN/Creatinine ratio 35 02/24/2017 10:00 AM    BUN/Creatinine ratio 38 09/13/2016 02:35 PM    BUN/Creatinine ratio 36 08/02/2016 01:50 PM    GFR est AA 45 06/23/2017 11:59 AM    GFR est AA 39 04/21/2017 11:21 AM    GFR est AA 51 02/24/2017 10:00 AM    GFR est AA 45 09/13/2016 02:35 PM    GFR est AA 54 08/02/2016 01:50 PM    GFR est non-AA 39 06/23/2017 11:59 AM    GFR est non-AA 34 04/21/2017 11:21 AM    GFR est non-AA 44 02/24/2017 10:00 AM    GFR est non-AA 39 09/13/2016 02:35 PM    GFR est non-AA 47 08/02/2016 01:50 PM    Calcium 10.1 06/23/2017 11:59 AM    Calcium 9.8 04/21/2017 11:21 AM    Calcium 9.8 02/24/2017 10:00 AM    Calcium 10.2 09/13/2016 02:35 PM    Calcium 9.4 08/02/2016 01:50 PM    Bilirubin, total 0.4 04/21/2017 11:21 AM    Bilirubin, total 0.2 02/24/2017 10:00 AM    Bilirubin, total <0.2 08/02/2016 01:50 PM    Bilirubin, total <0.2 07/22/2016 09:42 AM    Bilirubin, total 0.2 04/15/2016 01:44 PM    AST (SGOT) 11 04/21/2017 11:21 AM    AST (SGOT) 9 02/24/2017 10:00 AM    AST (SGOT) 10 08/02/2016 01:50 PM    AST (SGOT) 13 07/22/2016 09:42 AM    AST (SGOT) 9 04/15/2016 01:44 PM    Alk. phosphatase 131 04/21/2017 11:21 AM    Alk. phosphatase 121 02/24/2017 10:00 AM    Alk. phosphatase 117 08/02/2016 01:50 PM    Alk. phosphatase 121 07/22/2016 09:42 AM    Alk.  phosphatase 114 04/15/2016 01:44 PM    Protein, total 6.9 04/21/2017 11:21 AM    Protein, total 6.8 02/24/2017 10:00 AM    Protein, total 6.3 08/02/2016 01:50 PM    Protein, total 7.0 07/22/2016 09:42 AM    Protein, total 6.5 04/15/2016 01:44 PM    Albumin 4.2 04/21/2017 11:21 AM    Albumin 4.1 02/24/2017 10:00 AM    Albumin 4.2 09/13/2016 02:35 PM    Albumin 3.9 08/02/2016 01:50 PM    Albumin 4.1 07/22/2016 09:42 AM    A-G Ratio 1.6 04/21/2017 11:21 AM    A-G Ratio 1.5 02/24/2017 10:00 AM    A-G Ratio 1.6 08/02/2016 01:50 PM    A-G Ratio 1.4 07/22/2016 09:42 AM    A-G Ratio 1.4 04/15/2016 01:44 PM    ALT (SGPT) 10 04/21/2017 11:21 AM    ALT (SGPT) 11 02/24/2017 10:00 AM    ALT (SGPT) 13 08/02/2016 01:50 PM    ALT (SGPT) 11 07/22/2016 09:42 AM    ALT (SGPT) 10 04/15/2016 01:44 PM     No results found for: CPK, CPKX, CPX  Lab Results   Component Value Date/Time    Cholesterol, total 276 04/21/2017 11:21 AM    Cholesterol, total 236 02/24/2017 10:00 AM    Cholesterol, total 195 07/22/2016 09:42 AM    Cholesterol, total 220 12/16/2015 10:17 AM    Cholesterol, total 227 06/15/2015 10:51 AM    HDL Cholesterol 65 04/21/2017 11:21 AM    HDL Cholesterol 56 02/24/2017 10:00 AM    HDL Cholesterol 51 07/22/2016 09:42 AM    HDL Cholesterol 59 12/16/2015 10:17 AM    HDL Cholesterol 58 06/15/2015 10:51 AM    LDL, calculated 139 04/21/2017 11:21 AM    LDL, calculated 126 02/24/2017 10:00 AM    LDL, calculated 88 07/22/2016 09:42 AM    LDL, calculated 111 12/16/2015 10:17 AM    LDL, calculated 120 06/15/2015 10:51 AM    Triglyceride 358 04/21/2017 11:21 AM    Triglyceride 272 02/24/2017 10:00 AM    Triglyceride 280 07/22/2016 09:42 AM    Triglyceride 249 12/16/2015 10:17 AM    Triglyceride 243 06/15/2015 10:51 AM     No results found for this or any previous visit. Assessment:         Patient Active Problem List    Diagnosis Date Noted    Chronic gout of multiple sites 04/21/2017    CKD (chronic kidney disease) stage 3, GFR 30-59 ml/min 04/21/2017    Dry skin dermatitis 07/22/2016    CYRIL on CPAP 05/03/2016    Pruritic dermatitis 04/15/2016    Bronchitis 10/01/2015    Long term current use of anticoagulant 08/25/2015    Paroxysmal atrial fibrillation ([facility] 06/22/2015    Heart failure ([facility]     Coronary atherosclerosis of native coronary artery     Morbid obesity with BMI of 45.0-49.9, adult ([facility] 01/23/2015    GERD (gastroesophageal reflux disease)     Essential hypertension, benign     Hyperlipidemia     Gout     Osteoporosis     Anemia     COPD (chronic obstructive pulmonary disease) ([facility] 08/18/2012        Plan:     Doing well with no adverse cardiac symptoms. Lipids and labs followed by PCP. Continue current care and f/u in 6 months.     Avni Garrett MD

## 2017-09-01 NOTE — MR AVS SNAPSHOT
Visit Information Date & Time Provider Department Dept. Phone Encounter #  
 9/1/2017 11:00 AM Anayeli Evans, [address] [facility] [phone] Follow-up Instructions Return in about 6 months (around 3/1/2018). Follow-up and Disposition History Your Appointments 9/21/2017 10:30 AM  
ESTABLISHED PATIENT with Francee Cooks, PA BON [facility]aquin [facility] [address] ([facility] Appt Note: check up/labs; .  
 [address] [address]  
[address] [facility] [address] [address] Upcoming Health Maintenance Date Due Hepatitis C Screening 1945 FOOT EXAM Q1 11/13/1956 DTaP/Tdap/Td series (1 - Tdap) 11/13/1967 FOBT Q 1 YEAR AGE 50-75 11/13/1996 EYE EXAM RETINAL OR DILATED Q1 12/9/2014 GLAUCOMA SCREENING Q2Y 12/9/2015 INFLUENZA AGE 9 TO ADULT 8/1/2017 MICROALBUMIN Q1 9/13/2017 HEMOGLOBIN A1C Q6M 10/21/2017 LIPID PANEL Q1 4/21/2018 MEDICARE YEARLY EXAM 4/22/2018 BREAST CANCER SCRN MAMMOGRAM 3/21/2019 Allergies as of 9/1/2017  Review Complete On: 9/1/2017 By: Anayeli Evans MD  
  
 Severity Noted Reaction Type Reactions Tramadol Medium 07/29/2013    Hives, Rash Pcn [Penicillins]  10/05/2015    Rash, Nausea and Vomiting Shellfish Containing Products  08/17/2012    Shortness of Breath Statins-hmg-coa Reductase Inhibitors  01/27/2015   Side Effect Myalgia Current Immunizations  Reviewed on 4/21/2017 Name Date Influenza High Dose Vaccine PF 9/26/2016, 11/17/2015 Influenza Vaccine 11/8/2013  1:28 PM  
 Pneumococcal Conjugate (PCV-13) 12/20/2015 Pneumococcal Polysaccharide (PPSV-23) 11/8/2013  1:30 PM  
 Zoster Vaccine, Live 1/1/2010 Not reviewed this visit You Were Diagnosed With   
  
 Codes Comments  Atherosclerosis of native coronary artery of native heart without angina pectoris    -  Primary ICD-10-CM: I25.10 ICD-9-CM: 414.01 Paroxysmal atrial fibrillation (HCC)     ICD-10-CM: I48.0 ICD-9-CM: 427.31 Essential hypertension, benign     ICD-10-CM: I10 
ICD-9-CM: 401.1 Uncontrolled diabetes mellitus type 2 without complications, unspecified long term insulin use status ([facility]     ICD-10-CM: E11.65 ICD-9-CM: 250.02 Chronic bronchitis, unspecified chronic bronchitis type ([facility]     ICD-10-CM: S90 ICD-9-CM: 491.9 CYRIL on CPAP     ICD-10-CM: G47.33, Z99.89 ICD-9-CM: 327.23, V46.8 Pure hypercholesterolemia     ICD-10-CM: E78.00 ICD-9-CM: 272.0 Chronic diastolic heart failure (HCC)     ICD-10-CM: I50.32 
ICD-9-CM: 428.32 Vitals BP Pulse Resp Height(growth percentile) Weight(growth percentile) SpO2  
 132/70 (BP 1 Location: Left arm, BP Patient Position: Sitting) 73 24 4' 10\" (1.473 m) 201 lb 3.2 oz (91.3 kg) 93% BMI OB Status Smoking Status 42.05 kg/m2 Hysterectomy Former Smoker BMI and BSA Data Body Mass Index Body Surface Area 42.05 kg/m 2 1.93 m 2 Preferred Pharmacy Pharmacy Name Phone [address] [phone] Your Updated Medication List  
  
   
This list is accurate as of: 9/1/17 11:37 AM.  Always use your most recent med list.  
  
  
  
  
 albuterol-ipratropium 2.5 mg-0.5 mg/3 ml Nebu Commonly known as:  DUO-NEB  
3 mL by Nebulization route every six (6) hours as needed. allopurinol 100 mg tablet Commonly known as:  Judie Salts Take 2 Tabs by mouth daily. Indications: GOUT  
  
 ALPRAZolam 0.5 mg tablet Commonly known as:  Phoebe Chad Take 1 Tab by mouth two (2) times daily as needed for Anxiety or Sleep. Max Daily Amount: 1 mg. Indications: ANXIETY  
  
 aspirin 81 mg chewable tablet Take 81 mg by mouth daily. 4 Tablets Daily. atorvastatin 20 mg tablet Commonly known as:  LIPITOR  
TAKE 1 TABLET EVERY DAY  
  
 bumetanide 1 mg tablet Commonly known as:  Alexandr Patron Take 2 Tabs by mouth two (2) times a day. Indications: Edema  
  
 cholecalciferol 1,000 unit tablet Commonly known as:  VITAMIN D3 Take 1 Tab by mouth daily. dilTIAZem  mg ER capsule Commonly known as:  CARTIA XT  
TAKE 1 CAPSULE EVERY DAY  
  
 esomeprazole 20 mg capsule Commonly known as:  NexIUM Take 1 Cap by mouth daily. Indications: GASTROESOPHAGEAL REFLUX  
  
 famotidine 40 mg tablet Commonly known as:  PEPCID  
TAKE 1 TAB BY MOUTH DAILY FOR GASTROESOPHAGEAL REFLUX  
  
 gabapentin 300 mg capsule Commonly known as:  NEURONTIN Take 1 tablet nightly for 3 weeks then increase 2 tablets. Indications: NEUROPATHIC PAIN  
  
 isosorbide mononitrate ER 30 mg tablet Commonly known as:  IMDUR  
TAKE 1 TABLET EVERY DAY  
  
 metOLazone 2.5 mg tablet Commonly known as:  ZAROXOLYN  
2 tablets BID as needed for edema then maintenance dose of 2 tablets daily  Indications: PERIPHERAL EDEMA DUE TO CHRONIC HEART FAILURE  
  
 metoprolol tartrate 50 mg tablet Commonly known as:  LOPRESSOR Take 1 Tab by mouth two (2) times a day. NITROSTAT 0.4 mg SL tablet Generic drug:  nitroglycerin 0.4 mg by SubLINGual route every five (5) minutes as needed. nystatin powder Commonly known as:  MYCOSTATIN Apply  to affected area three (3) times daily. OXYGEN-AIR DELIVERY SYSTEMS Take 2 L by inhalation continuous. theophylline ER(12 hour) 450 mg tablet Commonly known as:  THEOCHRON  
TAKE 1 TALETB BY MOUTH DAILY. YOU NEED BLOOD WORK FOR THIS MEDICATION  
  
 triamcinolone acetonide 0.1 % ointment Commonly known as:  KENALOG Apply  to affected area two (2) times a day. use thin layer  
  
 warfarin 5 mg tablet Commonly known as:  COUMADIN  
5 mg Monday Wednesday Friday 7.5 mg rest the week We Performed the Following AMB POC EKG ROUTINE W/ 12 LEADS, INTER & REP [64193 CPT(R)] AMB POC EKG ROUTINE W/ 12 LEADS, INTER & REP [29161 CPT(R)] Follow-up Instructions Return in about 6 months (around 3/1/2018). Introducing [facility]! Eastonlupe Gonzales introduces [employer] patient portal. Now you can access parts of your medical record, email your doctor's office, and request medication refills online. 1. In your internet browser, go to [email or web address] 2. Click on the First Time User? Click Here link in the Sign In box. You will see the New Member Sign Up page. 3. Enter your [employer] Access Code exactly as it appears below. You will not need to use this code after youve completed the sign-up process. If you do not sign up before the expiration date, you must request a new code. · [employer] Access Code: [email or web address] Expires: 11/2/2017 10:29 AM 
 
4. Enter the last four digits of your Social Security Number (xxxx) and Date of Birth (mm/dd/yyyy) as indicated and click Submit. You will be taken to the next sign-up page. 5. Create a [employer] ID. This will be your [employer] login ID and cannot be changed, so think of one that is secure and easy to remember. 6. Create a [employer] password. You can change your password at any time. 7. Enter your Password Reset Question and Answer. This can be used at a later time if you forget your password. 8. Enter your e-mail address. You will receive e-mail notification when new information is available in [address] 9. Click Sign Up. You can now view and download portions of your medical record. 10. Click the Download Summary menu link to download a portable copy of your medical information. If you have questions, please visit the Frequently Asked Questions section of the [employer] website. Remember, [employer] is NOT to be used for urgent needs. For medical emergencies, dial 911. Now available from your iPhone and Android! Please provide this summary of care documentation to your next provider. Your primary care clinician is listed as [facility]. If you have any questions after today's visit, please call [phone].

## 2017-09-08 ENCOUNTER — TELEPHONE ANTICOAG (OUTPATIENT)
Dept: FAMILY MEDICINE CLINIC | Age: 72
End: 2017-09-08

## 2017-09-08 LAB — INR, EXTERNAL: 1.6

## 2017-09-14 ENCOUNTER — TELEPHONE (OUTPATIENT)
Dept: FAMILY MEDICINE CLINIC | Age: 72
End: 2017-09-14

## 2017-09-14 LAB — INR, EXTERNAL: 2.6

## 2017-09-14 NOTE — TELEPHONE ENCOUNTER
Patient reports problem with her INR test strips,so she replaced the test code chip and new strips with success.

## 2017-09-21 ENCOUNTER — OFFICE VISIT (OUTPATIENT)
Dept: FAMILY MEDICINE CLINIC | Age: 72
End: 2017-09-21

## 2017-09-21 VITALS
TEMPERATURE: 98.7 F | HEIGHT: 58 IN | BODY MASS INDEX: 41.56 KG/M2 | DIASTOLIC BLOOD PRESSURE: 78 MMHG | HEART RATE: 67 BPM | SYSTOLIC BLOOD PRESSURE: 140 MMHG | OXYGEN SATURATION: 87 % | WEIGHT: 198 LBS

## 2017-09-21 DIAGNOSIS — M79.671 PAIN IN BOTH FEET: ICD-10-CM

## 2017-09-21 DIAGNOSIS — Z79.01 LONG TERM CURRENT USE OF ANTICOAGULANT: Primary | ICD-10-CM

## 2017-09-21 DIAGNOSIS — Z99.89 OSA ON CPAP: ICD-10-CM

## 2017-09-21 DIAGNOSIS — J42 CHRONIC BRONCHITIS, UNSPECIFIED CHRONIC BRONCHITIS TYPE (HCC): ICD-10-CM

## 2017-09-21 DIAGNOSIS — E79.0 ELEVATED URIC ACID IN BLOOD: ICD-10-CM

## 2017-09-21 DIAGNOSIS — N18.4 CKD (CHRONIC KIDNEY DISEASE), STAGE 4 (SEVERE): ICD-10-CM

## 2017-09-21 DIAGNOSIS — L08.9 TOE INFECTION: ICD-10-CM

## 2017-09-21 DIAGNOSIS — G47.33 OSA ON CPAP: ICD-10-CM

## 2017-09-21 DIAGNOSIS — I25.10 ATHEROSCLEROSIS OF NATIVE CORONARY ARTERY OF NATIVE HEART WITHOUT ANGINA PECTORIS: ICD-10-CM

## 2017-09-21 DIAGNOSIS — M79.672 PAIN IN BOTH FEET: ICD-10-CM

## 2017-09-21 DIAGNOSIS — E11.9 DIABETES MELLITUS TYPE 2, DIET-CONTROLLED (HCC): ICD-10-CM

## 2017-09-21 DIAGNOSIS — Z23 ENCOUNTER FOR IMMUNIZATION: ICD-10-CM

## 2017-09-21 DIAGNOSIS — M1A.09X0 CHRONIC GOUT OF MULTIPLE SITES, UNSPECIFIED CAUSE: ICD-10-CM

## 2017-09-21 PROBLEM — G62.9 PERIPHERAL POLYNEUROPATHY: Status: ACTIVE | Noted: 2017-09-21

## 2017-09-21 LAB
INR BLD: 4.1
PT POC: 49.1 SECONDS
VALID INTERNAL CONTROL?: YES

## 2017-09-21 RX ORDER — CLARITHROMYCIN 500 MG/1
500 TABLET, FILM COATED ORAL 2 TIMES DAILY
Qty: 14 TAB | Refills: 0 | Status: SHIPPED | OUTPATIENT
Start: 2017-09-21 | End: 2017-09-25 | Stop reason: SDUPTHER

## 2017-09-21 RX ORDER — GABAPENTIN 300 MG/1
CAPSULE ORAL
Qty: 60 CAP | Refills: 6 | Status: SHIPPED | OUTPATIENT
Start: 2017-09-21 | End: 2017-09-21

## 2017-09-21 NOTE — PROGRESS NOTES
[address], [facility]   [phone]     9/21/2017  Chief Complaint   Patient presents with    Follow-up       HPI  Ms. Elizabeth Rahman is a 80 y.o. female presents in routine follow up. Feet pain-  Bottom of her feet hurt. On the balls of her feet. Also, states the left second toe is red and inflamed. Had a podiatry appointment Monday to have her toenails cut, this developed 2 days after. Past Medical History:   Diagnosis Date    Adjustment disorder with depressed mood 2009    Allergic rhinitis due to other allergen 2011    Anemia, unspecified 2009    COPD     Coronary atherosclerosis of native coronary artery     Essential hypertension, benign 2008    GERD (gastroesophageal reflux disease)     Gout, unspecified 2008    Heart failure (HCC)     hx of, asymptomic now    Impaired glucose tolerance test 2012    Morbid obesity with BMI of 45.0-49.9, adult ([facility] 1/23/2015    Osteoporosis, unspecified 2009    Other and unspecified hyperlipidemia 2008    Unspecified hereditary and idiopathic peripheral neuropathy 2010    Unspecified vitamin D deficiency 2009       Allergies   Allergen Reactions    Tramadol Hives and Rash    Pcn [Penicillins] Rash and Nausea and Vomiting    Shellfish Containing Products Shortness of Breath    Statins-Hmg-Coa Reductase Inhibitors Myalgia       Current Outpatient Prescriptions   Medication Sig    clarithromycin (BIAXIN) 500 mg tablet Take 1 Tab by mouth two (2) times a day for 7 days. Indications: Skin and Skin Structure Infection    bumetanide (BUMEX) 1 mg tablet Take 2 Tabs by mouth two (2) times a day.  Indications: Edema    metOLazone (ZAROXOLYN) 2.5 mg tablet 2 tablets BID as needed for edema then maintenance dose of 2 tablets daily  Indications: PERIPHERAL EDEMA DUE TO CHRONIC HEART FAILURE    warfarin (COUMADIN) 5 mg tablet 5 mg Monday Wednesday Friday 7.5 mg rest the week (Patient taking differently: T, W, Th, Sat 7.5 mg;  5 mg all other days)    isosorbide mononitrate ER (IMDUR) 30 mg tablet TAKE 1 TABLET EVERY DAY    theophylline ER,12 hour, (THEOCHRON) 450 mg tablet TAKE 1 TALETB BY MOUTH DAILY. YOU NEED BLOOD WORK FOR THIS MEDICATION    metoprolol tartrate (LOPRESSOR) 50 mg tablet Take 1 Tab by mouth two (2) times a day.  dilTIAZem CD (CARTIA XT) 120 mg ER capsule TAKE 1 CAPSULE EVERY DAY    atorvastatin (LIPITOR) 20 mg tablet TAKE 1 TABLET EVERY DAY    allopurinol (ZYLOPRIM) 100 mg tablet Take 2 Tabs by mouth daily. Indications: GOUT    esomeprazole (NEXIUM) 20 mg capsule Take 1 Cap by mouth daily. Indications: GASTROESOPHAGEAL REFLUX    triamcinolone acetonide (KENALOG) 0.1 % ointment Apply  to affected area two (2) times a day. use thin layer    famotidine (PEPCID) 40 mg tablet TAKE 1 TAB BY MOUTH DAILY FOR GASTROESOPHAGEAL REFLUX    nystatin (MYCOSTATIN) powder Apply  to affected area three (3) times daily.  aspirin 81 mg chewable tablet Take 81 mg by mouth daily. 4 Tablets Daily.  albuterol-ipratropium (DUO-NEB) 2.5 mg-0.5 mg/3 ml nebu 3 mL by Nebulization route every six (6) hours as needed.  OXYGEN-AIR DELIVERY SYSTEMS Take 2 L by inhalation continuous.  cholecalciferol (VITAMIN D3) 1,000 unit tablet Take 1 Tab by mouth daily. (Patient taking differently: Take 2,000 Units by mouth daily. Indications: VITAMIN D DEFICIENCY)    nitroglycerin (NITROSTAT) 0.4 mg SL tablet 0.4 mg by SubLINGual route every five (5) minutes as needed.  ALPRAZolam (XANAX) 0.5 mg tablet Take 1 Tab by mouth two (2) times daily as needed for Anxiety or Sleep. Max Daily Amount: 1 mg. Indications: ANXIETY     No current facility-administered medications for this visit.       Lab Results   Component Value Date/Time    Hemoglobin A1c 6.5 04/21/2017 11:21 AM    Hemoglobin A1c 6.4 02/24/2017 10:00 AM    Hemoglobin A1c 5.7 07/22/2016 09:42 AM    Glucose 104 06/23/2017 11:59 AM Microalb/Creat ratio (ug/mg creat.) 1176.1 09/13/2016 02:35 PM    LDL, calculated 139 04/21/2017 11:21 AM    Creatinine 1.37 06/23/2017 11:59 AM    Hemoglobin A1c, External 5.8 07/28/2016     Lab Results   Component Value Date/Time    Uric acid 9.4 04/21/2017 11:21 AM       Results for orders placed or performed in visit on 09/21/17   AMB PT/INR EXTERNAL   Result Value Ref Range    INR, External 2.6    AMB POC PT/INR   Result Value Ref Range    VALID INTERNAL CONTROL POC Yes     Prothrombin time (POC) 49.1 seconds    INR POC 4.1          Visit Vitals    /78 (BP 1 Location: Left leg, BP Patient Position: Sitting)    Pulse 67    Temp 98.7 °F (37.1 °C) (Oral)    Ht 4' 10\" (1.473 m)    Wt 198 lb (89.8 kg)    SpO2 (!) 87%    BMI 41.38 kg/m2     Physical Exam   Cardiovascular: Normal heart sounds. Pulmonary/Chest: Effort normal. She has wheezes. Abdominal: Soft. There is no tenderness. Musculoskeletal:        Feet:    Vitals reviewed. ICD-10-CM ICD-9-CM    1. Long term current use of anticoagulant Z79.01 V58.61 CBC WITH AUTOMATED DIFF      AMB POC PT/INR      COLLECTION CAPILLARY BLOOD SPECIMEN   2. Atherosclerosis of native coronary artery of native heart without angina pectoris I25.10 414.01    3. CYRIL on CPAP G47.33 327.23     Z99.89 V46.8    4. Chronic bronchitis, unspecified chronic bronchitis type ([facility] J42 491.9    5. Chronic gout of multiple sites, unspecified cause M1A. 09X0 274.02 URIC ACID   6. Diabetes mellitus type 2, diet-controlled (HCC) N83.9 224.81 METABOLIC PANEL, COMPREHENSIVE      HEMOGLOBIN A1C WITH EAG   7. Toe infection L08.9 686.9 clarithromycin (BIAXIN) 500 mg tablet   8. Pain in both feet M79.671 729.5     M79.672     9.  Encounter for immunization Z23 V03.89 INFLUENZA VIRUS VACCINE, HIGH DOSE SEASONAL, PRESERVATIVE FREE     Adjusted coumadin dosing; refer to anticoagulation chart   Patient requested lab results be fax to Dr. Marbin Stewart     Follow-up Disposition:  Return in about 6 months (around 3/21/2018).       Koby Paz PA-C, BRYANNA

## 2017-09-21 NOTE — MR AVS SNAPSHOT
Visit Information Date & Time Provider Department Dept. Phone Encounter #  
 9/21/2017 10:30 AM ROLY Del Cid [place] [address] [phone] [id number] Your Appointments 4/11/2018 11:00 AM  
6 MONTH with Geovanny Cole MD  
[address] [place] [facility]) Appt Note: Per Dr. Stephanie Moreno [address] [place] [address] [phone]  
  
   
 [address] [place] [address] [facility] Upcoming Health Maintenance Date Due Hepatitis C Screening 1945 FOOT EXAM Q1 11/13/1956 DTaP/Tdap/Td series (1 - Tdap) 11/13/1967 FOBT Q 1 YEAR AGE 50-75 11/13/1996 EYE EXAM RETINAL OR DILATED Q1 12/9/2014 GLAUCOMA SCREENING Q2Y 12/9/2015 INFLUENZA AGE 9 TO ADULT 8/1/2017 MICROALBUMIN Q1 9/13/2017 HEMOGLOBIN A1C Q6M 10/21/2017 LIPID PANEL Q1 4/21/2018 MEDICARE YEARLY EXAM 4/22/2018 BREAST CANCER SCRN MAMMOGRAM 3/21/2019 Allergies as of 9/21/2017  Review Complete On: 9/21/2017 By: ROLY Del Cid Severity Noted Reaction Type Reactions Tramadol Medium 07/29/2013    Hives, Rash Pcn [Penicillins]  10/05/2015    Rash, Nausea and Vomiting Shellfish Containing Products  08/17/2012    Shortness of Breath Statins-hmg-coa Reductase Inhibitors  01/27/2015   Side Effect Myalgia Current Immunizations  Reviewed on 4/21/2017 Name Date Influenza High Dose Vaccine PF  Incomplete, 9/26/2016, 11/17/2015 Influenza Vaccine 11/8/2013  1:28 PM  
 Pneumococcal Conjugate (PCV-13) 12/20/2015 Pneumococcal Polysaccharide (PPSV-23) 11/8/2013  1:30 PM  
 Zoster Vaccine, Live 1/1/2010 Not reviewed this visit You Were Diagnosed With   
  
 Codes Comments Long term current use of anticoagulant    -  Primary ICD-10-CM: Z79.01 
ICD-9-CM: V58.61 Atherosclerosis of native coronary artery of native heart without angina pectoris     ICD-10-CM: I25.10 ICD-9-CM: 414.01   
 CYRIL on CPAP     ICD-10-CM: G47.33, Z99.89 ICD-9-CM: 327.23, V46.8 Chronic bronchitis, unspecified chronic bronchitis type ([facility]     ICD-10-CM: U77 ICD-9-CM: 491.9 Chronic gout of multiple sites, unspecified cause     ICD-10-CM: M1A. 96Y3 ICD-9-CM: 274.02 Diabetes mellitus type 2, diet-controlled ([facility].)     ICD-10-CM: E11.9 ICD-9-CM: 250.00 Toe infection     ICD-10-CM: L08.9 ICD-9-CM: 686.9 Pain in both feet     ICD-10-CM: M79.671, H78.051 ICD-9-CM: 729.5 Encounter for immunization     ICD-10-CM: W55 ICD-9-CM: V03.89 Vitals BP Pulse Temp Height(growth percentile) Weight(growth percentile) SpO2  
 140/78 (BP 1 Location: Left leg, BP Patient Position: Sitting) 67 98.7 °F (37.1 °C) (Oral) 4' 10\" (1.473 m) 198 lb (89.8 kg) (!) 87% BMI OB Status Smoking Status 41.38 kg/m2 Hysterectomy Former Smoker BMI and BSA Data Body Mass Index Body Surface Area  
 41.38 kg/m 2 1.92 m 2 Preferred Pharmacy Pharmacy Name Phone [address] [facility] [address] [place] [address] [phone] Your Updated Medication List  
  
   
This list is accurate as of: 9/21/17 11:54 AM.  Always use your most recent med list.  
  
  
  
  
 albuterol-ipratropium 2.5 mg-0.5 mg/3 ml Nebu Commonly known as:  DUO-NEB  
3 mL by Nebulization route every six (6) hours as needed. allopurinol 100 mg tablet Commonly known as:  Merriandre Schilder Take 2 Tabs by mouth daily. Indications: GOUT  
  
 ALPRAZolam 0.5 mg tablet Commonly known as:  Deonna Hopes Take 1 Tab by mouth two (2) times daily as needed for Anxiety or Sleep. Max Daily Amount: 1 mg. Indications: ANXIETY  
  
 aspirin 81 mg chewable tablet Take 81 mg by mouth daily. 4 Tablets Daily. atorvastatin 20 mg tablet Commonly known as:  LIPITOR  
TAKE 1 TABLET EVERY DAY  
  
 bumetanide 1 mg tablet Commonly known as:  Eusebio Ramon Take 2 Tabs by mouth two (2) times a day. Indications: Edema  
  
 cholecalciferol 1,000 unit tablet Commonly known as:  VITAMIN D3 Take 1 Tab by mouth daily. clarithromycin 500 mg tablet Commonly known as:  Claire [place] Take 1 Tab by mouth two (2) times a day for 7 days. Indications: Skin and Skin Structure Infection  
  
 dilTIAZem  mg ER capsule Commonly known as:  CARTIA XT  
TAKE 1 CAPSULE EVERY DAY  
  
 esomeprazole 20 mg capsule Commonly known as:  NexIUM Take 1 Cap by mouth daily. Indications: GASTROESOPHAGEAL REFLUX  
  
 famotidine 40 mg tablet Commonly known as:  PEPCID  
TAKE 1 TAB BY MOUTH DAILY FOR GASTROESOPHAGEAL REFLUX  
  
 isosorbide mononitrate ER 30 mg tablet Commonly known as:  IMDUR  
TAKE 1 TABLET EVERY DAY  
  
 metOLazone 2.5 mg tablet Commonly known as:  ZAROXOLYN  
2 tablets BID as needed for edema then maintenance dose of 2 tablets daily  Indications: PERIPHERAL EDEMA DUE TO CHRONIC HEART FAILURE  
  
 metoprolol tartrate 50 mg tablet Commonly known as:  LOPRESSOR Take 1 Tab by mouth two (2) times a day. NITROSTAT 0.4 mg SL tablet Generic drug:  nitroglycerin 0.4 mg by SubLINGual route every five (5) minutes as needed. nystatin powder Commonly known as:  MYCOSTATIN Apply  to affected area three (3) times daily. OXYGEN-AIR DELIVERY SYSTEMS Take 2 L by inhalation continuous. theophylline ER(12 hour) 450 mg tablet Commonly known as:  THEOCHRON  
TAKE 1 TALETB BY MOUTH DAILY. YOU NEED BLOOD WORK FOR THIS MEDICATION  
  
 triamcinolone acetonide 0.1 % ointment Commonly known as:  KENALOG Apply  to affected area two (2) times a day. use thin layer  
  
 warfarin 5 mg tablet Commonly known as:  COUMADIN  
5 mg Monday Wednesday Friday 7.5 mg rest the week Prescriptions Sent to Pharmacy  Refills  
 clarithromycin (BIAXIN) 500 mg tablet 0  
 Sig: Take 1 Tab by mouth two (2) times a day for 7 days. Indications: Skin and Skin Structure Infection Class: Normal  
 Pharmacy: [address], [address] Ph #: [phone] Route: Oral  
  
Description Patient reports she was taking 2 tablets over th past week. Not as noted 1.5 tablets. September 2017 Details Sun Mon Tue Wed Thu Fri Sat  
       1  
  
  
  
   2  
  
  
  
  
  3  
  
  
  
   4  
  
  
  
   5  
  
  
  
   6  
  
  
  
   7  
  
  
  
   8  
  
  
  
   9  
  
  
  
  
  10  
  
  
  
   11  
  
  
  
   12  
  
  
  
   13  
  
  
  
   14  
  
  
  
   15  
  
  
  
   16  
  
  
  
  
  17  
  
  
  
   18  
  
  
  
   19  
  
  
  
   20  
  
  
  
   21  
  
5 mg See details 22  
  
7.5 mg  
  
   23  
  
7.5 mg  
  
  
  24  
  
7.5 mg  
  
   25  
  
7.5 mg  
  
   26  
  
7.5 mg  
  
   27  
  
7.5 mg  
  
   28  
  
7.5 mg  
  
   29  
  
  
  
   30  
  
  
  
  
 Date Details 09/21 This INR check INR: 4.1 Date of next INR:  9/28/2017 How to take your warfarin dose To take:  5 mg Take one of the 5 mg tablets. To take:  7.5 mg Take one and a half of the 5 mg tablets. We Performed the Following AMB POC PT/INR [45637 CPT(R)] AMB PT/INR EXTERNAL [PHB97549 CPT(R)] Comments: This order was created through the anticoagulation tracking navigator section. CBC WITH AUTOMATED DIFF [25844 CPT(R)] COLLECTION CAPILLARY BLOOD SPECIMEN [27770 CPT(R)] HEMOGLOBIN A1C WITH EAG [88244 CPT(R)] INFLUENZA VIRUS VACCINE, HIGH DOSE SEASONAL, PRESERVATIVE FREE [79791 CPT(R)] METABOLIC PANEL, COMPREHENSIVE [81361 CPT(R)] URIC ACID [id number] CPT(R)] Introducing [facility]! [facility] introduces [facility] patient portal. Now you can access parts of your medical record, email your doctor's office, and request medication refills online. 1. In your internet browser, go to [email or web address] 2. Click on the First Time User? Click Here link in the Sign In box. You will see the New Member Sign Up page. 3. Enter your Bellabeat Access Code exactly as it appears below. You will not need to use this code after youve completed the sign-up process. If you do not sign up before the expiration date, you must request a new code. · Bellabeat Access Code: [email or web address] Expires: 11/2/2017 10:29 AM 
 
4. Enter the last four digits of your Social Security Number (xxxx) and Date of Birth (mm/dd/yyyy) as indicated and click Submit. You will be taken to the next sign-up page. 5. Create a Pitzit ID. This will be your Bellabeat login ID and cannot be changed, so think of one that is secure and easy to remember. 6. Create a Bellabeat password. You can change your password at any time. 7. Enter your Password Reset Question and Answer. This can be used at a later time if you forget your password. 8. Enter your e-mail address. You will receive e-mail notification when new information is available in [address] 9. Click Sign Up. You can now view and download portions of your medical record. 10. Click the Download Summary menu link to download a portable copy of your medical information. If you have questions, please visit the Frequently Asked Questions section of the Bellabeat website. Remember, Bellabeat is NOT to be used for urgent needs. For medical emergencies, dial 911. Now available from your iPhone and Android! Please provide this summary of care documentation to your next provider. Your primary care clinician is listed as Erik Bass. If you have any questions after today's visit, please call [phone].

## 2017-09-22 LAB
ALBUMIN SERPL-MCNC: 4.2 G/DL (ref 3.5–4.8)
ALBUMIN/GLOB SERPL: 1.6 {RATIO} (ref 1.2–2.2)
ALP SERPL-CCNC: 122 IU/L (ref 39–117)
ALT SERPL-CCNC: 9 IU/L (ref 0–32)
AST SERPL-CCNC: 8 IU/L (ref 0–40)
BASOPHILS # BLD AUTO: 0 X10E3/UL (ref 0–0.2)
BASOPHILS NFR BLD AUTO: 0 %
BILIRUB SERPL-MCNC: 0.3 MG/DL (ref 0–1.2)
BUN SERPL-MCNC: 81 MG/DL (ref 8–27)
BUN/CREAT SERPL: 40 (ref 12–28)
CALCIUM SERPL-MCNC: 9.7 MG/DL (ref 8.7–10.3)
CHLORIDE SERPL-SCNC: 92 MMOL/L (ref 96–106)
CO2 SERPL-SCNC: 31 MMOL/L (ref 18–29)
CREAT SERPL-MCNC: 2.01 MG/DL (ref 0.57–1)
EOSINOPHIL # BLD AUTO: 0.3 X10E3/UL (ref 0–0.4)
EOSINOPHIL NFR BLD AUTO: 3 %
ERYTHROCYTE [DISTWIDTH] IN BLOOD BY AUTOMATED COUNT: 15.6 % (ref 12.3–15.4)
EST. AVERAGE GLUCOSE BLD GHB EST-MCNC: 146 MG/DL
GLOBULIN SER CALC-MCNC: 2.6 G/DL (ref 1.5–4.5)
GLUCOSE SERPL-MCNC: 152 MG/DL (ref 65–99)
HBA1C MFR BLD: 6.7 % (ref 4.8–5.6)
HCT VFR BLD AUTO: 31.4 % (ref 34–46.6)
HGB BLD-MCNC: 10.1 G/DL (ref 11.1–15.9)
IMM GRANULOCYTES # BLD: 0 X10E3/UL (ref 0–0.1)
IMM GRANULOCYTES NFR BLD: 0 %
INTERPRETATION: NORMAL
LYMPHOCYTES # BLD AUTO: 2 X10E3/UL (ref 0.7–3.1)
LYMPHOCYTES NFR BLD AUTO: 17 %
MCH RBC QN AUTO: 28 PG (ref 26.6–33)
MCHC RBC AUTO-ENTMCNC: 32.2 G/DL (ref 31.5–35.7)
MCV RBC AUTO: 87 FL (ref 79–97)
MONOCYTES # BLD AUTO: 0.9 X10E3/UL (ref 0.1–0.9)
MONOCYTES NFR BLD AUTO: 8 %
NEUTROPHILS # BLD AUTO: 8.8 X10E3/UL (ref 1.4–7)
NEUTROPHILS NFR BLD AUTO: 72 %
PLATELET # BLD AUTO: 313 X10E3/UL (ref 150–379)
POTASSIUM SERPL-SCNC: 4.8 MMOL/L (ref 3.5–5.2)
PROT SERPL-MCNC: 6.8 G/DL (ref 6–8.5)
RBC # BLD AUTO: 3.61 X10E6/UL (ref 3.77–5.28)
SODIUM SERPL-SCNC: 140 MMOL/L (ref 134–144)
URATE SERPL-MCNC: 16.9 MG/DL (ref 2.5–7.1)
WBC # BLD AUTO: 12.2 X10E3/UL (ref 3.4–10.8)

## 2017-09-25 ENCOUNTER — OFFICE VISIT (OUTPATIENT)
Dept: FAMILY MEDICINE CLINIC | Age: 72
End: 2017-09-25

## 2017-09-25 VITALS
DIASTOLIC BLOOD PRESSURE: 59 MMHG | HEART RATE: 63 BPM | TEMPERATURE: 97.8 F | SYSTOLIC BLOOD PRESSURE: 120 MMHG | WEIGHT: 198 LBS | OXYGEN SATURATION: 97 % | HEIGHT: 58 IN | RESPIRATION RATE: 16 BRPM | BODY MASS INDEX: 41.56 KG/M2

## 2017-09-25 DIAGNOSIS — L08.9 TOE INFECTION: ICD-10-CM

## 2017-09-25 RX ORDER — CLARITHROMYCIN 500 MG/1
500 TABLET, FILM COATED ORAL 2 TIMES DAILY
Qty: 14 TAB | Refills: 0 | Status: SHIPPED | OUTPATIENT
Start: 2017-09-25 | End: 2017-10-02

## 2017-09-25 NOTE — MR AVS SNAPSHOT
Visit Information Date & Time Provider Department Dept. Phone Encounter #  
 9/25/2017  1:30 PM Ta Chamberlain NP [place] [id number] [facility] [phone] [id number] Your Appointments 4/11/2018 11:00 AM  
6 MONTH with Jimmy Haley MD  
[address] [place] [facility]) Appt Note: Per Dr. Rockey Cranker [address] [place] [address] [phone]  
  
   
 [address] [place] [address] [address] [address] Upcoming Health Maintenance Date Due Hepatitis C Screening 1945 FOOT EXAM Q1 11/13/1956 DTaP/Tdap/Td series (1 - Tdap) 11/13/1967 FOBT Q 1 YEAR AGE 50-75 11/13/1996 EYE EXAM RETINAL OR DILATED Q1 12/9/2014 GLAUCOMA SCREENING Q2Y 12/9/2015 MICROALBUMIN Q1 9/13/2017 HEMOGLOBIN A1C Q6M 3/21/2018 LIPID PANEL Q1 4/21/2018 MEDICARE YEARLY EXAM 4/22/2018 BREAST CANCER SCRN MAMMOGRAM 3/21/2019 Allergies as of 9/25/2017  Review Complete On: 9/25/2017 By: Kelli Freeman LPN Severity Noted Reaction Type Reactions Tramadol Medium 07/29/2013    Hives, Rash Pcn [Penicillins]  10/05/2015    Rash, Nausea and Vomiting Shellfish Containing Products  08/17/2012    Shortness of Breath Statins-hmg-coa Reductase Inhibitors  01/27/2015   Side Effect Myalgia Current Immunizations  Reviewed on 4/21/2017 Name Date Influenza High Dose Vaccine PF 9/21/2017, 9/26/2016, 11/17/2015 Influenza Vaccine 11/8/2013  1:28 PM  
 Pneumococcal Conjugate (PCV-13) 12/20/2015 Pneumococcal Polysaccharide (PPSV-23) 11/8/2013  1:30 PM  
 Zoster Vaccine, Live 1/1/2010 Not reviewed this visit You Were Diagnosed With   
  
 Codes Comments Toe infection     ICD-10-CM: L08.9 ICD-9-CM: 289. 9 Vitals BP Pulse Temp Resp Height(growth percentile) 120/59 (BP 1 Location: Right arm, BP Patient Position: Sitting) 63 97.8 °F (36.6 °C) (Temporal) 16 4' 10\" (1.473 m) Weight(growth percentile) SpO2 BMI OB Status Smoking Status 198 lb (89.8 kg) 97% 41.38 kg/m2 Hysterectomy Former Smoker BMI and BSA Data Body Mass Index Body Surface Area  
 41.38 kg/m 2 1.92 m 2 Preferred Pharmacy Pharmacy Name Phone [address], [address] [place] [phone] Your Updated Medication List  
  
   
This list is accurate as of: 9/25/17  2:31 PM.  Always use your most recent med list.  
  
  
  
  
 albuterol-ipratropium 2.5 mg-0.5 mg/3 ml Nebu Commonly known as:  DUO-NEB  
3 mL by Nebulization route every six (6) hours as needed. allopurinol 100 mg tablet Commonly known as:  Nnamdiconstance Ochoa Take 2 Tabs by mouth daily. Indications: GOUT  
  
 ALPRAZolam 0.5 mg tablet Commonly known as:  Sherlesli Menchaca Take 1 Tab by mouth two (2) times daily as needed for Anxiety or Sleep. Max Daily Amount: 1 mg. Indications: ANXIETY  
  
 aspirin 81 mg chewable tablet Take 81 mg by mouth daily. 4 Tablets Daily. atorvastatin 20 mg tablet Commonly known as:  LIPITOR  
TAKE 1 TABLET EVERY DAY  
  
 bumetanide 1 mg tablet Commonly known as:  [place] Take 2 Tabs by mouth two (2) times a day. Indications: Edema  
  
 cholecalciferol 1,000 unit tablet Commonly known as:  VITAMIN D3 Take 1 Tab by mouth daily. clarithromycin 500 mg tablet Commonly known as:  Derotha Pester Take 1 Tab by mouth two (2) times a day for 7 days. Indications: Skin and Skin Structure Infection  
  
 dilTIAZem  mg ER capsule Commonly known as:  CARTIA XT  
TAKE 1 CAPSULE EVERY DAY  
  
 esomeprazole 20 mg capsule Commonly known as:  NexIUM Take 1 Cap by mouth daily. Indications: GASTROESOPHAGEAL REFLUX  
  
 famotidine 40 mg tablet Commonly known as:  PEPCID  
TAKE 1 TAB BY MOUTH DAILY FOR GASTROESOPHAGEAL REFLUX  
  
 isosorbide mononitrate ER 30 mg tablet Commonly known as:  IMDUR  
TAKE 1 TABLET EVERY DAY  
  
 metOLazone 2.5 mg tablet Commonly known as:  ZAROXOLYN  
2 tablets BID as needed for edema then maintenance dose of 2 tablets daily  Indications: PERIPHERAL EDEMA DUE TO CHRONIC HEART FAILURE  
  
 metoprolol tartrate 50 mg tablet Commonly known as:  LOPRESSOR Take 1 Tab by mouth two (2) times a day. NITROSTAT 0.4 mg SL tablet Generic drug:  nitroglycerin 0.4 mg by SubLINGual route every five (5) minutes as needed. nystatin powder Commonly known as:  MYCOSTATIN Apply  to affected area three (3) times daily. OXYGEN-AIR DELIVERY SYSTEMS Take 2 L by inhalation continuous. theophylline ER(12 hour) 450 mg tablet Commonly known as:  THEOCHRON  
TAKE 1 TALETB BY MOUTH DAILY. YOU NEED BLOOD WORK FOR THIS MEDICATION  
  
 triamcinolone acetonide 0.1 % ointment Commonly known as:  KENALOG Apply  to affected area two (2) times a day. use thin layer  
  
 warfarin 5 mg tablet Commonly known as:  COUMADIN  
5 mg Monday Wednesday Friday 7.5 mg rest the week Prescriptions Sent to Pharmacy Refills  
 clarithromycin (BIAXIN) 500 mg tablet 0 Sig: Take 1 Tab by mouth two (2) times a day for 7 days. Indications: Skin and Skin Structure Infection Class: Normal  
 Pharmacy: [address], [address] [facility] [place] Ph #: [phone] Route: Oral  
  
We Performed the Following AEROBIC BACTERIAL CULTURE [id number] CPT(R)] Introducing [facility] SERVICES! Vasu Carter introduces ufindads patient portal. Now you can access parts of your medical record, email your doctor's office, and request medication refills online. 1. In your internet browser, go to [email or web address] 2. Click on the First Time User? Click Here link in the Sign In box. You will see the New Member Sign Up page. 3. Enter your ufindads Access Code exactly as it appears below. You will not need to use this code after youve completed the sign-up process.  If you do not sign up before the expiration date, you must request a new code. · SoloLearn Access Code: [email or web address] Expires: 11/2/2017 10:29 AM 
 
4. Enter the last four digits of your Social Security Number (xxxx) and Date of Birth (mm/dd/yyyy) as indicated and click Submit. You will be taken to the next sign-up page. 5. Create a SoloLearn ID. This will be your SoloLearn login ID and cannot be changed, so think of one that is secure and easy to remember. 6. Create a SoloLearn password. You can change your password at any time. 7. Enter your Password Reset Question and Answer. This can be used at a later time if you forget your password. 8. Enter your e-mail address. You will receive e-mail notification when new information is available in [address] 9. Click Sign Up. You can now view and download portions of your medical record. 10. Click the Download Summary menu link to download a portable copy of your medical information. If you have questions, please visit the Frequently Asked Questions section of the SoloLearn website. Remember, SoloLearn is NOT to be used for urgent needs. For medical emergencies, dial 911. Now available from your iPhone and Android! Please provide this summary of care documentation to your next provider. Your primary care clinician is listed as Alejo Contreras. If you have any questions after today's visit, please call [phone].

## 2017-09-26 NOTE — PROGRESS NOTES
Subjective: Elen Alonso is a 80 y.o. female who presents today with the following:  Chief Complaint   Patient presents with    Toe Pain     Presents for follow up from toe infection. Taking Biaxin prescribed by Melina Grey. Discussed obtaining a culture of the wound. COMPLIANT WITH MEDICATION:   HTN; Denies chest pain, dyspnea, palpitations, headache and blurred vision. Blood pressure normotensive. ROS:  Gen: denies fever, chills, fatigue, weight loss, weight gain  HEENT:denies blurry vision, nasal congestion, sore throat  Resp: denies dypsnea, cough, wheezing  CV: denies chest pain radiating to the jaws or arms, palpitations, lower extremity edema  Abd: denies nausea, vomiting, diarrhea, constipation  Neuro: denies numbness/tingling  Endo: denies polyuria, polydipsia, heat/cold intolerance  Heme: no lymphadenopathy    Allergies   Allergen Reactions    Tramadol Hives and Rash    Pcn [Penicillins] Rash and Nausea and Vomiting    Shellfish Containing Products Shortness of Breath    Statins-Hmg-Coa Reductase Inhibitors Myalgia         Current Outpatient Prescriptions:     clarithromycin (BIAXIN) 500 mg tablet, Take 1 Tab by mouth two (2) times a day for 7 days. Indications: Skin and Skin Structure Infection, Disp: 14 Tab, Rfl: 0    bumetanide (BUMEX) 1 mg tablet, Take 2 Tabs by mouth two (2) times a day.  Indications: Edema, Disp: 240 Tab, Rfl: 4    metOLazone (ZAROXOLYN) 2.5 mg tablet, 2 tablets BID as needed for edema then maintenance dose of 2 tablets daily  Indications: PERIPHERAL EDEMA DUE TO CHRONIC HEART FAILURE, Disp: 240 Tab, Rfl: 5    warfarin (COUMADIN) 5 mg tablet, 5 mg Monday Wednesday Friday 7.5 mg rest the week (Patient taking differently: T, W, Th, Sat 7.5 mg;  5 mg all other days), Disp: 135 Tab, Rfl: 1    isosorbide mononitrate ER (IMDUR) 30 mg tablet, TAKE 1 TABLET EVERY DAY, Disp: 90 Tab, Rfl: 3    theophylline ER,12 hour, (THEOCHRON) 450 mg tablet, TAKE 1 TALETB BY MOUTH DAILY. YOU NEED BLOOD WORK FOR THIS MEDICATION, Disp: 90 Tab, Rfl: 3    metoprolol tartrate (LOPRESSOR) 50 mg tablet, Take 1 Tab by mouth two (2) times a day., Disp: 180 Tab, Rfl: 3    dilTIAZem CD (CARTIA XT) 120 mg ER capsule, TAKE 1 CAPSULE EVERY DAY, Disp: 90 Cap, Rfl: 1    atorvastatin (LIPITOR) 20 mg tablet, TAKE 1 TABLET EVERY DAY, Disp: 90 Tab, Rfl: 2    allopurinol (ZYLOPRIM) 100 mg tablet, Take 2 Tabs by mouth daily. Indications: GOUT, Disp: 90 Tab, Rfl: 1    esomeprazole (NEXIUM) 20 mg capsule, Take 1 Cap by mouth daily. Indications: GASTROESOPHAGEAL REFLUX, Disp: 90 Cap, Rfl: 1    triamcinolone acetonide (KENALOG) 0.1 % ointment, Apply  to affected area two (2) times a day. use thin layer, Disp: 30 g, Rfl: 3    ALPRAZolam (XANAX) 0.5 mg tablet, Take 1 Tab by mouth two (2) times daily as needed for Anxiety or Sleep. Max Daily Amount: 1 mg. Indications: ANXIETY, Disp: 45 Tab, Rfl: 1    famotidine (PEPCID) 40 mg tablet, TAKE 1 TAB BY MOUTH DAILY FOR GASTROESOPHAGEAL REFLUX, Disp: 90 Tab, Rfl: 3    nystatin (MYCOSTATIN) powder, Apply  to affected area three (3) times daily. , Disp: 60 g, Rfl: 3    aspirin 81 mg chewable tablet, Take 81 mg by mouth daily. 4 Tablets Daily. , Disp: , Rfl:     albuterol-ipratropium (DUO-NEB) 2.5 mg-0.5 mg/3 ml nebu, 3 mL by Nebulization route every six (6) hours as needed. , Disp: 90 mL, Rfl: 2    OXYGEN-AIR DELIVERY SYSTEMS, Take 2 L by inhalation continuous. , Disp: , Rfl:     cholecalciferol (VITAMIN D3) 1,000 unit tablet, Take 1 Tab by mouth daily. (Patient taking differently: Take 2,000 Units by mouth daily. Indications: VITAMIN D DEFICIENCY), Disp: 30 Tab, Rfl: 7    nitroglycerin (NITROSTAT) 0.4 mg SL tablet, 0.4 mg by SubLINGual route every five (5) minutes as needed.   , Disp: , Rfl:     Past Medical History:   Diagnosis Date    Adjustment disorder with depressed mood 2009    Allergic rhinitis due to other allergen 2011    Anemia, unspecified 2009  COPD     Coronary atherosclerosis of native coronary artery     Essential hypertension, benign 2008    GERD (gastroesophageal reflux disease)     Gout, unspecified 2008    Heart failure (HCC)     hx of, asymptomic now    Impaired glucose tolerance test 2012    Morbid obesity with BMI of 45.0-49.9, adult ([facility] [address] 1/23/2015    Osteoporosis, unspecified 2009    Other and unspecified hyperlipidemia 2008    Unspecified hereditary and idiopathic peripheral neuropathy 2010    Unspecified vitamin D deficiency 2009       Past Surgical History:   Procedure Laterality Date    HX APPENDECTOMY      HX COLONOSCOPY      HX CORONARY STENT PLACEMENT  2000    LCx    HX HEART CATHETERIZATION  2000    HX HEART CATHETERIZATION  2012    LVEWDP 11, PCWP 11, Inf Hypo EF 45%, LAD 30%, mRCA 100% R to R and L to R collaterals    HX PTCA  2012    Unsuccessful attempt to opne RCA    HX ROBERT AND BSO         History   Smoking Status    Former Smoker    Packs/day: 1.00    Years: 40.00    Quit date: 12/1/2013   Smokeless Tobacco    Not on file       Social History     Social History    Marital status:      Spouse name: N/A    Number of children: N/A    Years of education: N/A     Social History Main Topics    Smoking status: Former Smoker     Packs/day: 1.00     Years: 40.00     Quit date: 12/1/2013    Smokeless tobacco: None    Alcohol use No    Drug use: No    Sexual activity: Not Asked     Other Topics Concern     Service No    Blood Transfusions Yes    Caffeine Concern No    Occupational Exposure No    Hobby Hazards No    Sleep Concern Yes    Stress Concern No    Weight Concern Yes    Special Diet No    Back Care Yes    Exercise No    Bike Helmet Yes    Seat Belt Yes    Self-Exams Yes     Social History Narrative       Family History   Problem Relation Age of Onset    Arthritis-osteo Mother     Diabetes Mother     Heart Disease Mother     Lung Disease Mother     Hypertension Sister     Asthma Sister     Asthma Brother     Heart Disease Maternal Grandmother     No Known Problems Brother     No Known Problems Sister     Kidney Disease Daughter     Lupus Daughter          Objective:     Visit Vitals    /59 (BP 1 Location: Right arm, BP Patient Position: Sitting)    Pulse 63    Temp 97.8 °F (36.6 °C) (Temporal)    Resp 16    Ht 4' 10\" (1.473 m)    Wt 198 lb (89.8 kg)    SpO2 97%    BMI 41.38 kg/m2     Body mass index is 41.38 kg/(m^2). General: Alert and oriented. No acute distress. Well nourished  HEENT :  Ears:TMs are normal. Canals are clear. Eyes: pupils equal, round, react to light and accommodation. Extra ocular movements intact. Nose: patent. Mouth and throat is clear. Neck:supple full range of motion no thyromegaly. Trachea midline, No carotid bruits. No significant lymphadenopathy  Lungs[de-identified] clear to auscultation without wheezes, rales, or rhonchi. Heart :RRR, S1 & S2 are normal intensity. No murmur; no gallop  Abdomen: bowel sounds active. No tenderness, guarding, rebound, masses, hepatic or spleen enlargement  Back: no CVA tenderness. Extremities: without clubbing, cyanosis, or edema  Pulses: radial and femoral pulses are normal  Neuro: HMF intact. Cranial nerves II through XII grossly normal.  Motor: is 5 over 5 and symmetrical.   Deep tendon reflexes: +2 equal    Culture of left 2nd toe obtained.          Diabetic foot exam performed by Prosper Lorenzo NP     Measurement  Response Nurse Comment Physician Comment   Monofilament  R - normal sensation with micro filament  L - normal sensation with micro filament     Pulse DP R - present  L - present     Pulse TP R - present  L - present     Structural deformity R - None  L - None     Skin Integrity / Deformity R - None  L - None        Reviewed by: Jamel Zuniga NP-WILDER          Results for orders placed or performed in visit on 09/21/17   URIC ACID   Result Value Ref Range    Uric acid 16.9 (HH) 2.5 - 7.1 mg/dL   METABOLIC PANEL, COMPREHENSIVE   Result Value Ref Range    Glucose 152 (H) 65 - 99 mg/dL    BUN 81 (HH) 8 - 27 mg/dL    Creatinine 2.01 (H) 0.57 - 1.00 mg/dL    GFR est non-AA 25 (L) >59 mL/min/1.73    GFR est AA 28 (L) >59 mL/min/1.73    BUN/Creatinine ratio 40 (H) 12 - 28    Sodium 140 134 - 144 mmol/L    Potassium 4.8 3.5 - 5.2 mmol/L    Chloride 92 (L) 96 - 106 mmol/L    CO2 31 (H) 18 - 29 mmol/L    Calcium 9.7 8.7 - 10.3 mg/dL    Protein, total 6.8 6.0 - 8.5 g/dL    Albumin 4.2 3.5 - 4.8 g/dL    GLOBULIN, TOTAL 2.6 1.5 - 4.5 g/dL    A-G Ratio 1.6 1.2 - 2.2    Bilirubin, total 0.3 0.0 - 1.2 mg/dL    Alk. phosphatase 122 (H) 39 - 117 IU/L    AST (SGOT) 8 0 - 40 IU/L    ALT (SGPT) 9 0 - 32 IU/L   HEMOGLOBIN A1C WITH EAG   Result Value Ref Range    Hemoglobin A1c 6.7 (H) 4.8 - 5.6 %    Estimated average glucose 146 mg/dL   CBC WITH AUTOMATED DIFF   Result Value Ref Range    WBC 12.2 (H) 3.4 - 10.8 x10E3/uL    RBC 3.61 (L) 3.77 - 5.28 x10E6/uL    HGB 10.1 (L) 11.1 - 15.9 g/dL    HCT 31.4 (L) 34.0 - 46.6 %    MCV 87 79 - 97 fL    MCH 28.0 26.6 - 33.0 pg    MCHC 32.2 31.5 - 35.7 g/dL    RDW 15.6 (H) 12.3 - 15.4 %    PLATELET 468 723 - 076 x10E3/uL    NEUTROPHILS 72 %    Lymphocytes 17 %    MONOCYTES 8 %    EOSINOPHILS 3 %    BASOPHILS 0 %    ABS. NEUTROPHILS 8.8 (H) 1.4 - 7.0 x10E3/uL    Abs Lymphocytes 2.0 0.7 - 3.1 x10E3/uL    ABS. MONOCYTES 0.9 0.1 - 0.9 x10E3/uL    ABS. EOSINOPHILS 0.3 0.0 - 0.4 x10E3/uL    ABS. BASOPHILS 0.0 0.0 - 0.2 x10E3/uL    IMMATURE GRANULOCYTES 0 %    ABS. IMM. GRANS. 0.0 0.0 - 0.1 x10E3/uL   AMB PT/INR EXTERNAL   Result Value Ref Range    INR, External 2.6    CKD REPORT   Result Value Ref Range    Interpretation Note    AMB POC PT/INR   Result Value Ref Range    VALID INTERNAL CONTROL POC Yes     Prothrombin time (POC) 49.1 seconds    INR POC 4.1        No results found for this visit on 09/25/17. Assessment/ Plan:     Diagnoses and all orders for this visit:    1.  Toe infection  -     clarithromycin (BIAXIN) 500 mg tablet; Take 1 Tab by mouth two (2) times a day for 7 days. Indications: Skin and Skin Structure Infection  -     AEROBIC BACTERIAL CULTURE         1. Toe infection        Orders Placed This Encounter    AEROBIC BACTERIAL CULTURE    clarithromycin (BIAXIN) 500 mg tablet     Sig: Take 1 Tab by mouth two (2) times a day for 7 days. Indications: Skin and Skin Structure Infection     Dispense:  14 Tab     Refill:  0     RTO in 3 months or sooner if symptoms persist or worsen for follow up for chronic medical conditions. Verbal and written instructions (see AVS) provided.  Patient expresses understanding of diagnosis and treatment plan.     RADHA Phillips

## 2017-09-28 ENCOUNTER — TELEPHONE ANTICOAG (OUTPATIENT)
Dept: FAMILY MEDICINE CLINIC | Age: 72
End: 2017-09-28

## 2017-09-28 LAB — INR, EXTERNAL: 5.9

## 2017-09-29 LAB — BACTERIA SPEC AEROBE CULT: NORMAL

## 2017-09-29 NOTE — PROGRESS NOTES
Called and spoke with the patient to discuss     She reports she has not been drinking well. Encouraged her to increase her water intake and repeat lab work in 2 weeks to recheck blood work.

## 2017-10-05 ENCOUNTER — TELEPHONE (OUTPATIENT)
Dept: FAMILY MEDICINE CLINIC | Age: 72
End: 2017-10-05

## 2017-10-05 NOTE — TELEPHONE ENCOUNTER
MD Inr called to let us know Mrs. Moore's INR was 1.7 today. Her last result was 5.9 and she was advised not to take the Warfarin Thurs, Fri, Sat and Sun. Then resume at 7.5 mg on Monday.

## 2017-10-09 ENCOUNTER — TELEPHONE (OUTPATIENT)
Dept: FAMILY MEDICINE CLINIC | Age: 72
End: 2017-10-09

## 2017-10-09 NOTE — TELEPHONE ENCOUNTER
Spoke with patient. Luca Lund started her on Gabapentin 300 mg 1 hs for 3 weeks. Patient stated she is unable to tolerate medication. It makes her feel dizzy and weak like she can not walk and she might fall. She would like to come off medication. Asking can she just stop med ? Or does she have to dc slowly ?

## 2017-10-10 ENCOUNTER — OFFICE VISIT (OUTPATIENT)
Dept: FAMILY MEDICINE CLINIC | Age: 72
End: 2017-10-10

## 2017-10-10 VITALS
TEMPERATURE: 97.6 F | BODY MASS INDEX: 41.02 KG/M2 | DIASTOLIC BLOOD PRESSURE: 78 MMHG | OXYGEN SATURATION: 93 % | HEIGHT: 58 IN | HEART RATE: 89 BPM | SYSTOLIC BLOOD PRESSURE: 128 MMHG | WEIGHT: 195.4 LBS

## 2017-10-10 DIAGNOSIS — Z23 ENCOUNTER FOR IMMUNIZATION: ICD-10-CM

## 2017-10-10 DIAGNOSIS — I10 ESSENTIAL HYPERTENSION, BENIGN: ICD-10-CM

## 2017-10-10 DIAGNOSIS — I48.0 PAROXYSMAL ATRIAL FIBRILLATION (HCC): Primary | ICD-10-CM

## 2017-10-10 DIAGNOSIS — S91.301D OPEN WOUND OF RIGHT FOOT, SUBSEQUENT ENCOUNTER: ICD-10-CM

## 2017-10-10 DIAGNOSIS — N18.30 CKD (CHRONIC KIDNEY DISEASE) STAGE 3, GFR 30-59 ML/MIN (HCC): ICD-10-CM

## 2017-10-10 LAB
INR BLD: 2.3
PT POC: 27.5 SECONDS
VALID INTERNAL CONTROL?: YES

## 2017-10-10 RX ORDER — GABAPENTIN 300 MG/1
CAPSULE ORAL
COMMUNITY
Start: 2017-09-22 | End: 2018-01-01 | Stop reason: ALTCHOICE

## 2017-10-10 NOTE — PROGRESS NOTES
Results for orders placed or performed in visit on 10/10/17   AMB POC PT/INR   Result Value Ref Range    VALID INTERNAL CONTROL POC Yes     Prothrombin time (POC) 27.5 seconds    INR POC 2.3

## 2017-10-10 NOTE — MR AVS SNAPSHOT
Visit Information Date & Time Provider Department Dept. Phone Encounter #  
 10/10/2017  3:00 PM Venita Ng NP [facility] [address] [facility] [phone] [id number] Your Appointments 12/27/2017 11:00 AM  
ESTABLISHED PATIENT with Venita Ng NP  
[address] ([address]) Appt Note: 3 mo F/U  
 [address] [address]  
[address] [facility] [address] [facility] [address] 4/11/2018 11:00 AM  
6 MONTH with Kristen Whatley MD  
[address] [facility] [address]) Appt Note: Per Dr. Cordelia Cummings [address] [facility] [address] [address] [phone]  
  
   
 [address] [address] Upcoming Health Maintenance Date Due Hepatitis C Screening 1945 DTaP/Tdap/Td series (1 - Tdap) 11/13/1967 FOBT Q 1 YEAR AGE 50-75 11/13/1996 EYE EXAM RETINAL OR DILATED Q1 12/9/2014 GLAUCOMA SCREENING Q2Y 12/9/2015 MICROALBUMIN Q1 9/13/2017 HEMOGLOBIN A1C Q6M 3/21/2018 LIPID PANEL Q1 4/21/2018 MEDICARE YEARLY EXAM 4/22/2018 FOOT EXAM Q1 9/25/2018 BREAST CANCER SCRN MAMMOGRAM 3/21/2019 Allergies as of 10/10/2017  Review Complete On: 10/10/2017 By: Venita Ng NP Severity Noted Reaction Type Reactions Tramadol Medium 07/29/2013    Hives, Rash Pcn [Penicillins]  10/05/2015    Rash, Nausea and Vomiting Shellfish Containing Products  08/17/2012    Shortness of Breath Statins-hmg-coa Reductase Inhibitors  01/27/2015   Side Effect Myalgia Current Immunizations  Reviewed on 4/21/2017 Name Date Influenza High Dose Vaccine PF 9/21/2017, 9/26/2016, 11/17/2015 Influenza Vaccine 11/8/2013  1:28 PM  
 Pneumococcal Conjugate (PCV-13) 12/20/2015 Pneumococcal Polysaccharide (PPSV-23) 11/8/2013  1:30 PM  
 Td, Adsorbed PF  Incomplete Zoster Vaccine, Live 1/1/2010 Not reviewed this visit You Were Diagnosed With   
  
 Codes Comments Paroxysmal atrial fibrillation (HCC)    -  Primary ICD-10-CM: I48.0 ICD-9-CM: 427.31 Encounter for immunization     ICD-10-CM: I62 ICD-9-CM: V03.89 CKD (chronic kidney disease) stage 3, GFR 30-59 ml/min     ICD-10-CM: N18.3 ICD-9-CM: 084. 3 Essential hypertension, benign     ICD-10-CM: I10 
ICD-9-CM: 401.1 Vitals BP Pulse Temp Height(growth percentile) Weight(growth percentile) 128/78 (BP 1 Location: Left arm, BP Patient Position: Sitting) 89 97.6 °F (36.4 °C) (Temporal) 4' 10\" (1.473 m) 195 lb 6.4 oz (88.6 kg) SpO2 BMI OB Status Smoking Status 93% 40.84 kg/m2 Hysterectomy Former Smoker Vitals History BMI and BSA Data Body Mass Index Body Surface Area  
 40.84 kg/m 2 1.9 m 2 Preferred Pharmacy Pharmacy Name Phone [address] [phone] Your Updated Medication List  
  
   
This list is accurate as of: 10/10/17  4:27 PM.  Always use your most recent med list.  
  
  
  
  
 albuterol-ipratropium 2.5 mg-0.5 mg/3 ml Nebu Commonly known as:  DUO-NEB  
3 mL by Nebulization route every six (6) hours as needed. allopurinol 100 mg tablet Commonly known as:  Yuval Newton Take 2 Tabs by mouth daily. Indications: GOUT  
  
 ALPRAZolam 0.5 mg tablet Commonly known as:  Bandar Messina Take 1 Tab by mouth two (2) times daily as needed for Anxiety or Sleep. Max Daily Amount: 1 mg. Indications: ANXIETY  
  
 aspirin 81 mg chewable tablet Take 81 mg by mouth daily. 4 Tablets Daily. atorvastatin 20 mg tablet Commonly known as:  LIPITOR  
TAKE 1 TABLET EVERY DAY  
  
 bumetanide 1 mg tablet Commonly known as:  Royal Hutching Take 2 Tabs by mouth two (2) times a day. Indications: Edema  
  
 cholecalciferol 1,000 unit tablet Commonly known as:  VITAMIN D3 Take 1 Tab by mouth daily. dilTIAZem  mg ER capsule Commonly known as:  CARTIA XT  
TAKE 1 CAPSULE EVERY DAY  
  
 esomeprazole 20 mg capsule Commonly known as:  NexIUM Take 1 Cap by mouth daily. Indications: GASTROESOPHAGEAL REFLUX  
  
 famotidine 40 mg tablet Commonly known as:  PEPCID  
TAKE 1 TAB BY MOUTH DAILY FOR GASTROESOPHAGEAL REFLUX  
  
 gabapentin 300 mg capsule Commonly known as:  NEURONTIN  
  
 isosorbide mononitrate ER 30 mg tablet Commonly known as:  IMDUR  
TAKE 1 TABLET EVERY DAY  
  
 metOLazone 2.5 mg tablet Commonly known as:  ZAROXOLYN  
2 tablets BID as needed for edema then maintenance dose of 2 tablets daily  Indications: PERIPHERAL EDEMA DUE TO CHRONIC HEART FAILURE  
  
 metoprolol tartrate 50 mg tablet Commonly known as:  LOPRESSOR Take 1 Tab by mouth two (2) times a day. NITROSTAT 0.4 mg SL tablet Generic drug:  nitroglycerin 0.4 mg by SubLINGual route every five (5) minutes as needed. nystatin powder Commonly known as:  MYCOSTATIN Apply  to affected area three (3) times daily. OXYGEN-AIR DELIVERY SYSTEMS Take 2 L by inhalation continuous. theophylline ER(12 hour) 450 mg tablet Commonly known as:  THEOCHRON  
TAKE 1 TALETB BY MOUTH DAILY. YOU NEED BLOOD WORK FOR THIS MEDICATION  
  
 triamcinolone acetonide 0.1 % ointment Commonly known as:  KENALOG Apply  to affected area two (2) times a day. use thin layer  
  
 warfarin 5 mg tablet Commonly known as:  COUMADIN  
5 mg Monday Wednesday Friday 7.5 mg rest the week We Performed the Following AMB POC PT/INR [05368 CPT(R)] COLLECTION VENOUS BLOOD,VENIPUNCTURE [id number] CPT(R)] METABOLIC PANEL, COMPREHENSIVE [25485 CPT(R)] MICROALBUMIN, UR, RAND W/ MICROALBUMIN/CREA RATIO [id number] CPT(R)]   
 TD VACCINE PRESERVATIVE FREE [67559 CPT(R)] Introducing [facility]!    
 Danna Chaudhari introduces [employer] patient portal. Now you can access parts of your medical record, email your doctor's office, and request medication refills online. 1. In your internet browser, go to [email or web address] 2. Click on the First Time User? Click Here link in the Sign In box. You will see the New Member Sign Up page. 3. Enter your Eponym Access Code exactly as it appears below. You will not need to use this code after youve completed the sign-up process. If you do not sign up before the expiration date, you must request a new code. · Eponym Access Code: [email or web address] Expires: 11/2/2017 10:29 AM 
 
4. Enter the last four digits of your Social Security Number (xxxx) and Date of Birth (mm/dd/yyyy) as indicated and click Submit. You will be taken to the next sign-up page. 5. Create a Eponym ID. This will be your Eponym login ID and cannot be changed, so think of one that is secure and easy to remember. 6. Create a Eponym password. You can change your password at any time. 7. Enter your Password Reset Question and Answer. This can be used at a later time if you forget your password. 8. Enter your e-mail address. You will receive e-mail notification when new information is available in [address] 9. Click Sign Up. You can now view and download portions of your medical record. 10. Click the Download Summary menu link to download a portable copy of your medical information. If you have questions, please visit the Frequently Asked Questions section of the Eponym website. Remember, Eponym is NOT to be used for urgent needs. For medical emergencies, dial 911. Now available from your iPhone and Android! Please provide this summary of care documentation to your next provider. Your primary care clinician is listed as Myrna Medeiros. If you have any questions after today's visit, please call [phone].

## 2017-10-10 NOTE — PROGRESS NOTES
Subjective: Doc Casarez is a 80 y.o. female who presents today with the following:  Chief Complaint   Patient presents with    Toe Pain     left 2nd toe, f/u toe infection    Dizziness     Scottie Aguilar presented to the office today for follow up of an acute left foot second toe pain and acute dizziness. Toe: 2nd toe left foot healing and less red and swollen from previous visit. No infection present according to culture results. HM: Declined Hepatitis C screening, given a fit test, declined a colonoscopy, given the TDAP vaccine today. Dizziness:resolved after stopping Gabapentin    HTN: in general well health without complaints. Normotensive today. Kidney disease: chronic disease, microablumin done today and CMP      COMPLIANT WITH MEDICATION:   HTN; Denies chest pain, dyspnea, palpitations, headache and blurred vision. Blood pressure normotensive. ROS:  Gen: denies fever, chills, fatigue, weight loss, weight gain  HEENT:denies blurry vision, nasal congestion, sore throat  Resp: denies dypsnea, cough, wheezing  CV: denies chest pain radiating to the jaws or arms, palpitations, lower extremity edema  Abd: denies nausea, vomiting, diarrhea, constipation  Neuro: denies numbness/tingling  Endo: denies polyuria, polydipsia, heat/cold intolerance  Heme: no lymphadenopathy    Allergies   Allergen Reactions    Tramadol Hives and Rash    Pcn [Penicillins] Rash and Nausea and Vomiting    Shellfish Containing Products Shortness of Breath    Statins-Hmg-Coa Reductase Inhibitors Myalgia         Current Outpatient Prescriptions:     bumetanide (BUMEX) 1 mg tablet, Take 2 Tabs by mouth two (2) times a day.  Indications: Edema, Disp: 240 Tab, Rfl: 4    metOLazone (ZAROXOLYN) 2.5 mg tablet, 2 tablets BID as needed for edema then maintenance dose of 2 tablets daily  Indications: PERIPHERAL EDEMA DUE TO CHRONIC HEART FAILURE, Disp: 240 Tab, Rfl: 5    warfarin (COUMADIN) 5 mg tablet, 5 mg Monday Wednesday Friday 7.5 mg rest the week (Patient taking differently: T, W, Th, Sat 7.5 mg;  5 mg all other days), Disp: 135 Tab, Rfl: 1    isosorbide mononitrate ER (IMDUR) 30 mg tablet, TAKE 1 TABLET EVERY DAY, Disp: 90 Tab, Rfl: 3    theophylline ER,12 hour, (THEOCHRON) 450 mg tablet, TAKE 1 TALETB BY MOUTH DAILY. YOU NEED BLOOD WORK FOR THIS MEDICATION, Disp: 90 Tab, Rfl: 3    metoprolol tartrate (LOPRESSOR) 50 mg tablet, Take 1 Tab by mouth two (2) times a day., Disp: 180 Tab, Rfl: 3    dilTIAZem CD (CARTIA XT) 120 mg ER capsule, TAKE 1 CAPSULE EVERY DAY, Disp: 90 Cap, Rfl: 1    atorvastatin (LIPITOR) 20 mg tablet, TAKE 1 TABLET EVERY DAY, Disp: 90 Tab, Rfl: 2    allopurinol (ZYLOPRIM) 100 mg tablet, Take 2 Tabs by mouth daily. Indications: GOUT, Disp: 90 Tab, Rfl: 1    esomeprazole (NEXIUM) 20 mg capsule, Take 1 Cap by mouth daily. Indications: GASTROESOPHAGEAL REFLUX, Disp: 90 Cap, Rfl: 1    triamcinolone acetonide (KENALOG) 0.1 % ointment, Apply  to affected area two (2) times a day. use thin layer, Disp: 30 g, Rfl: 3    ALPRAZolam (XANAX) 0.5 mg tablet, Take 1 Tab by mouth two (2) times daily as needed for Anxiety or Sleep. Max Daily Amount: 1 mg. Indications: ANXIETY, Disp: 45 Tab, Rfl: 1    famotidine (PEPCID) 40 mg tablet, TAKE 1 TAB BY MOUTH DAILY FOR GASTROESOPHAGEAL REFLUX, Disp: 90 Tab, Rfl: 3    nystatin (MYCOSTATIN) powder, Apply  to affected area three (3) times daily. , Disp: 60 g, Rfl: 3    aspirin 81 mg chewable tablet, Take 81 mg by mouth daily. 4 Tablets Daily. , Disp: , Rfl:     albuterol-ipratropium (DUO-NEB) 2.5 mg-0.5 mg/3 ml nebu, 3 mL by Nebulization route every six (6) hours as needed. , Disp: 90 mL, Rfl: 2    OXYGEN-AIR DELIVERY SYSTEMS, Take 2 L by inhalation continuous. , Disp: , Rfl:     cholecalciferol (VITAMIN D3) 1,000 unit tablet, Take 1 Tab by mouth daily. (Patient taking differently: Take 2,000 Units by mouth daily.  Indications: VITAMIN D DEFICIENCY), Disp: 30 Tab, Rfl: 7   nitroglycerin (NITROSTAT) 0.4 mg SL tablet, 0.4 mg by SubLINGual route every five (5) minutes as needed.   , Disp: , Rfl:     gabapentin (NEURONTIN) 300 mg capsule, , Disp: , Rfl:     Past Medical History:   Diagnosis Date    Adjustment disorder with depressed mood 2009    Allergic rhinitis due to other allergen 2011    Anemia, unspecified 2009    COPD     Coronary atherosclerosis of native coronary artery     Essential hypertension, benign 2008    GERD (gastroesophageal reflux disease)     Gout, unspecified 2008    Heart failure ([facility]     hx of, asymptomic now    Impaired glucose tolerance test 2012    Morbid obesity with BMI of 45.0-49.9, adult ([facility] 1/23/2015    Osteoporosis, unspecified 2009    Other and unspecified hyperlipidemia 2008    Unspecified hereditary and idiopathic peripheral neuropathy 2010    Unspecified vitamin D deficiency 2009       Past Surgical History:   Procedure Laterality Date    HX APPENDECTOMY      HX COLONOSCOPY      HX CORONARY STENT PLACEMENT  2000    LCx    HX HEART CATHETERIZATION  2000    HX HEART CATHETERIZATION  2012    LVEWDP 11, PCWP 11, Inf Hypo EF 45%, LAD 30%, mRCA 100% R to R and L to R collaterals    HX PTCA  2012    Unsuccessful attempt to opne RCA    HX ROBERT AND BSO         History   Smoking Status    Former Smoker    Packs/day: 1.00    Years: 40.00    Quit date: 12/1/2013   Smokeless Tobacco    Not on file       Social History     Social History    Marital status:      Spouse name: N/A    Number of children: N/A    Years of education: N/A     Social History Main Topics    Smoking status: Former Smoker     Packs/day: 1.00     Years: 40.00     Quit date: 12/1/2013    Smokeless tobacco: None    Alcohol use No    Drug use: No    Sexual activity: Not Asked     Other Topics Concern     Service No    Blood Transfusions Yes    Caffeine Concern No    Occupational Exposure No    Hobby Hazards No    Sleep Concern Yes    Stress Concern No    Weight Concern Yes    Special Diet No    Back Care Yes    Exercise No    Bike Helmet Yes    Seat Belt Yes    Self-Exams Yes     Social History Narrative       Family History   Problem Relation Age of Onset    Arthritis-osteo Mother     Diabetes Mother     Heart Disease Mother     Lung Disease Mother     Hypertension Sister     Asthma Sister     Asthma Brother     Heart Disease Maternal Grandmother     No Known Problems Brother     No Known Problems Sister     Kidney Disease Daughter     Lupus Daughter          Objective:     Visit Vitals    /78 (BP 1 Location: Left arm, BP Patient Position: Sitting)    Pulse 89    Temp 97.6 °F (36.4 °C) (Temporal)    Ht 4' 10\" (1.473 m)    Wt 195 lb 6.4 oz (88.6 kg)    SpO2 93%    BMI 40.84 kg/m2     Body mass index is 40.84 kg/(m^2). General: Alert and oriented. No acute distress. Well nourished  HEENT :  Ears:TMs are normal. Canals are clear. Eyes: pupils equal, round, react to light and accommodation. Extra ocular movements intact. Nose: patent. Mouth and throat is clear. Neck:supple full range of motion no thyromegaly. Trachea midline, No carotid bruits. No significant lymphadenopathy  Lungs[de-identified] clear to auscultation without wheezes, rales, or rhonchi. Heart :RRR, S1 & S2 are normal intensity. No murmur; no gallop  Abdomen: bowel sounds active. No tenderness, guarding  Back: no CVA tenderness. Extremities: without clubbing, cyanosis, or edema, 2nd toe left foot healing with slight redness. Slight numbness and tingling pain on the bottom of both feet-declined a referral to podiatrist.   Pulses: radial and femoral pulses are normal  Neuro: HMF intact.  Cranial nerves II through XII grossly normal.    Results for orders placed or performed in visit on 10/10/17   AMB POC PT/INR   Result Value Ref Range    VALID INTERNAL CONTROL POC Yes     Prothrombin time (POC) 27.5 seconds    INR POC 2.3        Results for orders placed or performed in visit on 10/10/17   AMB POC PT/INR   Result Value Ref Range    VALID INTERNAL CONTROL POC Yes     Prothrombin time (POC) 27.5 seconds    INR POC 2.3        Assessment/ Plan:     Diagnoses and all orders for this visit:    1. Paroxysmal atrial fibrillation (HCC)  -     AMB POC PT/INR    2. Encounter for immunization  -     TD VACCINE PRESERVATIVE FREE    3. CKD (chronic kidney disease) stage 3, GFR 30-59 ml/min  -     MICROALBUMIN, UR, RAND W/ MICROALBUMIN/CREA RATIO    4. Essential hypertension, benign  -     METABOLIC PANEL, COMPREHENSIVE  -     COLLECTION VENOUS BLOOD,VENIPUNCTURE         1. Paroxysmal atrial fibrillation (HCC)    2. Encounter for immunization    3. CKD (chronic kidney disease) stage 3, GFR 30-59 ml/min    4. Essential hypertension, benign        Orders Placed This Encounter    COLLECTION VENOUS BLOOD,VENIPUNCTURE    TD VACCINE PRESERVATIVE FREE    MICROALBUMIN, UR, RAND W/ MICROALBUMIN/CREA RATIO    METABOLIC PANEL, COMPREHENSIVE    AMB POC PT/INR    gabapentin (NEURONTIN) 300 mg capsule     Toe healing well. Gabapentin discontinued due to side effect. Benefits of a colonoscopy explained to patient- she declined and sent home with a Fit test to return to office. Verbal and written instructions (see AVS) provided.  Patient expresses understanding of diagnosis and treatment plan. Follow-up Disposition:  Return in about 3 months (around 1/10/2018).       RADHA Ramos

## 2017-10-11 LAB
ALBUMIN SERPL-MCNC: 4.3 G/DL (ref 3.5–4.8)
ALBUMIN/CREAT UR: 364.6 MG/G CREAT (ref 0–30)
ALBUMIN/GLOB SERPL: 1.4 {RATIO} (ref 1.2–2.2)
ALP SERPL-CCNC: 120 IU/L (ref 39–117)
ALT SERPL-CCNC: 11 IU/L (ref 0–32)
AST SERPL-CCNC: 15 IU/L (ref 0–40)
BILIRUB SERPL-MCNC: 0.3 MG/DL (ref 0–1.2)
BUN SERPL-MCNC: 82 MG/DL (ref 8–27)
BUN/CREAT SERPL: 47 (ref 12–28)
CALCIUM SERPL-MCNC: 10.6 MG/DL (ref 8.7–10.3)
CHLORIDE SERPL-SCNC: 95 MMOL/L (ref 96–106)
CO2 SERPL-SCNC: 28 MMOL/L (ref 18–29)
CREAT SERPL-MCNC: 1.75 MG/DL (ref 0.57–1)
CREAT UR-MCNC: 31.4 MG/DL
GLOBULIN SER CALC-MCNC: 3 G/DL (ref 1.5–4.5)
GLUCOSE SERPL-MCNC: 120 MG/DL (ref 65–99)
INTERPRETATION: NORMAL
MICROALBUMIN UR-MCNC: 114.5 UG/ML
POTASSIUM SERPL-SCNC: 4.4 MMOL/L (ref 3.5–5.2)
PROT SERPL-MCNC: 7.3 G/DL (ref 6–8.5)
SODIUM SERPL-SCNC: 146 MMOL/L (ref 134–144)

## 2017-10-12 NOTE — PROGRESS NOTES
Referral to Dr. Amanda Abdi   If she has not established with another nephrologist.   Please send labs to nephrologist office .

## 2017-10-23 ENCOUNTER — TELEPHONE (OUTPATIENT)
Dept: FAMILY MEDICINE CLINIC | Age: 72
End: 2017-10-23

## 2017-10-26 ENCOUNTER — TELEPHONE (OUTPATIENT)
Dept: FAMILY MEDICINE CLINIC | Age: 72
End: 2017-10-26

## 2017-10-26 NOTE — TELEPHONE ENCOUNTER
Dr Colleen Barone would like for Mrs Edith Epstein to have  Gout blood work. He did not give her an order to have this done.   [phone]

## 2017-10-26 NOTE — TELEPHONE ENCOUNTER
Lito Persaud has already placed future orders for labs. Advised patient can come in tomorrow lab visit scheduled. Visit Information Date & Time Provider Department Dept. Phone Encounter #  
 10/9/2017  2:15 PM Jerry Garcia MD [address] [place] [address] [id number] Follow-up Instructions Return in about 1 year (around 10/9/2018). Upcoming Health Maintenance Date Due DTaP/Tdap/Td series (1 - Tdap) 3/1/2011 PAP AKA CERVICAL CYTOLOGY 3/1/2011 INFLUENZA AGE 9 TO ADULT 8/1/2017 Allergies as of 10/9/2017  Review Complete On: 10/9/2017 By: Jenny Callaway LPN Severity Noted Reaction Type Reactions Cinnamon High 10/25/2016    Anaphylaxis Dilaudid [Hydromorphone] High 12/22/2015    Hives, Swelling Zofran (As Hydrochloride) [Ondansetron Hcl] High 12/22/2015    Hives, Swelling Sulfa (Sulfonamide Antibiotics) Medium 12/22/2015    Hives, Nausea and Vomiting Dilaudid [Hydromorphone (Bulk)]  02/06/2014    Hives Imitrex [Sumatriptan Succinate]  04/18/2014    Nausea Only Mushroom  10/25/2016    Vertigo Red Dye  01/16/2017    Itching, Swelling In eyes Sulfa (Sulfonamide Antibiotics)  02/06/2014    Hives Zofran [Ondansetron Hcl (Pf)]  02/06/2014    Hives Current Immunizations  Reviewed on 10/3/2016 Name Date Influenza Vaccine (Quad) PF  Incomplete, 10/3/2016 Not reviewed this visit You Were Diagnosed With   
  
 Codes Comments Acne vulgaris    -  Primary ICD-10-CM: L70.0 ICD-9-CM: 706.1 Menorrhagia with regular cycle     ICD-10-CM: N92.0 ICD-9-CM: 626.2 Encounter for immunization     ICD-10-CM: X26 ICD-9-CM: V03.89 Vitals BP Pulse Temp Resp Height(growth percentile) Weight(growth percentile) 118/78 66 98.3 °F (36.8 °C) (Oral) 16 5' 4\" (1.626 m) 108 lb (49 kg) LMP SpO2 BMI OB Status Smoking Status 09/12/2017 98% 18.54 kg/m2 Having regular periods Never Smoker Vitals History BMI and BSA Data Body Mass Index Body Surface Area  18.54 kg/m 2 1.49 m 2  
  
  
 Preferred Pharmacy Pharmacy Name Phone [facility] [phone] Your Updated Medication List  
  
   
This list is accurate as of: 10/9/17  2:53 PM.  Always use your most recent med list.  
  
  
  
  
 butalbital-acetaminophen-caffeine -40 mg per tablet Commonly known as:  FIORICET, ESGIC  
1-2 tabs at onset of migraine. Can repeat 1 tab in 4 hours if headache persists. Limit: 3 tabs/ day, max 3 days a week  
  
 doxycycline 100 mg tablet Commonly known as:  VIBRA-TABS  
  
 EPIDUO FORTE 0.3-2.5 % Glwp Generic drug:  adapalene-benzoyl peroxide EPINEPHrine 0.3 mg/0.3 mL injection Commonly known as:  EPIPEN  
0.3 mL by IntraMUSCular route once as needed for up to 1 dose. metroNIDAZOLE 1 % topical cream  
Commonly known as:  Krista Deluca Apply  to affected area daily. Use a thin layer to affected areas after washing  
  
 norgestrel-ethinyl estradiol 0.3-30 mg-mcg Tab Commonly known as:  LO/OVRAL Take 1 Tab by mouth daily. rizatriptan 10 mg disintegrating tablet Commonly known as:  MAXALT-MLT 1 tab at onset of migraine; repeat 1 tab in 2 hours if HA remains; Limit: 2 tabs in 24 hours, max 3 days/ week. 30 Day Rx  
  
 vitamin E 400 unit capsule Commonly known as:  [address] Take  by mouth daily. Prescriptions Sent to Pharmacy Refills  
 metroNIDAZOLE (NORITATE) 1 % topical cream 0 Sig: Apply  to affected area daily. Use a thin layer to affected areas after washing Class: Normal  
 Pharmacy: [facility] [address] [place] Ph #: [phone] Route: Topical  
 norgestrel-ethinyl estradiol (LO/OVRAL) 0.3-30 mg-mcg tab 11 Sig: Take 1 Tab by mouth daily. Class: Normal  
 Pharmacy: [facility] [address] [place] Ph #: [phone] Route: Oral  
  
We Performed the Following INFLUENZA VIRUS VAC QUAD,SPLIT,PRESV FREE SYRINGE IM [id number] CPT(R)] Follow-up Instructions Return in about 1 year (around 10/9/2018). Introducing [facility] & HEALTH SERVICES! Dear Hilda Muñoz: Thank you for requesting a [employer] account. Our records indicate that you already have an active [employer] account. You can access your account anytime at [email or web address] Did you know that you can access your hospital and ER discharge instructions at any time in [employer]? You can also review all of your test results from your hospital stay or ER visit. Additional Information If you have questions, please visit the Frequently Asked Questions section of the [employer] website at [email or web address] Remember, [employer] is NOT to be used for urgent needs. For medical emergencies, dial 911. Now available from your iPhone and Android! Please provide this summary of care documentation to your next provider. Your primary care clinician is listed as Vega Castro. If you have any questions after today's visit, please call [phone].

## 2017-10-27 ENCOUNTER — TELEPHONE (OUTPATIENT)
Dept: FAMILY MEDICINE CLINIC | Age: 72
End: 2017-10-27

## 2017-10-27 ENCOUNTER — LAB ONLY (OUTPATIENT)
Dept: FAMILY MEDICINE CLINIC | Age: 72
End: 2017-10-27

## 2017-10-27 DIAGNOSIS — N18.4 STAGE 4 CHRONIC KIDNEY DISEASE (HCC): ICD-10-CM

## 2017-10-27 DIAGNOSIS — E79.0 ELEVATED URIC ACID IN BLOOD: ICD-10-CM

## 2017-10-27 NOTE — MR AVS SNAPSHOT
Visit Information Date & Time Provider Department Dept. Phone Encounter #  
 10/27/2017  9:45 AM [facility] NURSE [address] [phone] [id number] Your Appointments 12/27/2017 11:00 AM  
ESTABLISHED PATIENT with Chema Guallpa NP  
[address] ([facility]) Appt Note: 3 mo F/U  
 [address]  
[phone]  
  
   
 [facility] [address] [id number]  
  
    
 3/22/2018  9:50 AM  
ESTABLISHED PATIENT with Checo Eagle MD  
[address] ([facility]) Appt Note: 6 mo F/U  
 [address] [address]  
[address] [facility] [address] [place] [address] [address] [address] 4/11/2018 11:00 AM  
6 MONTH with Ang Irwin MD  
[address] [facility]) Appt Note: Per Dr. Yessenia Vera [address] [facility] [address] [address] [phone]  
  
   
 [address] [place] [address] Upcoming Health Maintenance Date Due Hepatitis C Screening 1945 FOBT Q 1 YEAR AGE 50-75 11/13/1996 EYE EXAM RETINAL OR DILATED Q1 12/9/2014 GLAUCOMA SCREENING Q2Y 12/9/2015 DTaP/Tdap/Td series (1 - Tdap) 10/12/2017 HEMOGLOBIN A1C Q6M 3/21/2018 LIPID PANEL Q1 4/21/2018 MEDICARE YEARLY EXAM 4/22/2018 FOOT EXAM Q1 9/25/2018 MICROALBUMIN Q1 10/10/2018 BREAST CANCER SCRN MAMMOGRAM 3/21/2019 Allergies as of 10/27/2017  Review Complete On: 10/10/2017 By: Chema Guallpa NP Severity Noted Reaction Type Reactions Tramadol Medium 07/29/2013    Hives, Rash Pcn [Penicillins]  10/05/2015    Rash, Nausea and Vomiting Shellfish Containing Products  08/17/2012    Shortness of Breath Statins-hmg-coa Reductase Inhibitors  01/27/2015   Side Effect Myalgia Current Immunizations  Reviewed on 4/21/2017 Name Date Influenza High Dose Vaccine PF 9/21/2017, 9/26/2016, 11/17/2015 Influenza Vaccine 11/8/2013  1:28 PM  
 Pneumococcal Conjugate (PCV-13) 12/20/2015 Pneumococcal Polysaccharide (PPSV-23) 11/8/2013  1:30 PM  
 Td, Adsorbed 10/11/2017 Zoster Vaccine, Live 1/1/2010 Not reviewed this visit You Were Diagnosed With   
  
 Codes Comments Stage 4 chronic kidney disease ([facility]     ICD-10-CM: N18.4 ICD-9-CM: 215. 4 Elevated uric acid in blood     ICD-10-CM: E79.0 ICD-9-CM: 790.6 Vitals OB Status Smoking Status Hysterectomy Former Smoker Preferred Pharmacy Pharmacy Name Phone [address] [place] [phone] Your Updated Medication List  
  
   
This list is accurate as of: 10/27/17  9:58 AM.  Always use your most recent med list.  
  
  
  
  
 albuterol-ipratropium 2.5 mg-0.5 mg/3 ml Nebu Commonly known as:  DUO-NEB  
3 mL by Nebulization route every six (6) hours as needed. allopurinol 100 mg tablet Commonly known as:  Donna Odom Take 2 Tabs by mouth daily. Indications: GOUT  
  
 ALPRAZolam 0.5 mg tablet Commonly known as:  [place]well Samanta Take 1 Tab by mouth two (2) times daily as needed for Anxiety or Sleep. Max Daily Amount: 1 mg. Indications: ANXIETY  
  
 aspirin 81 mg chewable tablet Take 81 mg by mouth daily. 4 Tablets Daily. atorvastatin 20 mg tablet Commonly known as:  LIPITOR  
TAKE 1 TABLET EVERY DAY  
  
 bumetanide 1 mg tablet Commonly known as:  Assunta Brink Take 2 Tabs by mouth two (2) times a day. Indications: Edema  
  
 cholecalciferol 1,000 unit tablet Commonly known as:  VITAMIN D3 Take 1 Tab by mouth daily. dilTIAZem  mg ER capsule Commonly known as:  CARTIA XT  
TAKE 1 CAPSULE EVERY DAY  
  
 esomeprazole 20 mg capsule Commonly known as:  NexIUM Take 1 Cap by mouth daily. Indications: GASTROESOPHAGEAL REFLUX famotidine 40 mg tablet Commonly known as:  PEPCID  
TAKE 1 TAB BY MOUTH DAILY FOR GASTROESOPHAGEAL REFLUX  
  
 gabapentin 300 mg capsule Commonly known as:  NEURONTIN  
  
 isosorbide mononitrate ER 30 mg tablet Commonly known as:  IMDUR  
TAKE 1 TABLET EVERY DAY  
  
 metOLazone 2.5 mg tablet Commonly known as:  ZAROXOLYN  
2 tablets BID as needed for edema then maintenance dose of 2 tablets daily  Indications: PERIPHERAL EDEMA DUE TO CHRONIC HEART FAILURE  
  
 metoprolol tartrate 50 mg tablet Commonly known as:  LOPRESSOR Take 1 Tab by mouth two (2) times a day. NITROSTAT 0.4 mg SL tablet Generic drug:  nitroglycerin 0.4 mg by SubLINGual route every five (5) minutes as needed. nystatin powder Commonly known as:  MYCOSTATIN Apply  to affected area three (3) times daily. OXYGEN-AIR DELIVERY SYSTEMS Take 2 L by inhalation continuous. theophylline ER(12 hour) 450 mg tablet Commonly known as:  THEOCHRON  
TAKE 1 TALETB BY MOUTH DAILY. YOU NEED BLOOD WORK FOR THIS MEDICATION  
  
 triamcinolone acetonide 0.1 % ointment Commonly known as:  KENALOG Apply  to affected area two (2) times a day. use thin layer  
  
 warfarin 5 mg tablet Commonly known as:  COUMADIN  
5 mg Monday Wednesday Friday 7.5 mg rest the week We Performed the Following CBC WITH AUTOMATED DIFF [19895 CPT(R)] METABOLIC PANEL, BASIC [72637 CPT(R)] [place] COLLECTION VENOUS BLOOD,VENIPUNCTURE [id number] CPT(R)] URIC ACID [id number] CPT(R)] Introducing hospitals & HEALTH SERVICES! Abdi Coburn introduces [employer] patient portal. Now you can access parts of your medical record, email your doctor's office, and request medication refills online. 1. In your internet browser, go to [email or web address] 2. Click on the First Time User? Click Here link in the Sign In box. You will see the New Member Sign Up page. 3. Enter your [employer] Access Code exactly as it appears below. You will not need to use this code after youve completed the sign-up process. If you do not sign up before the expiration date, you must request a new code. · [employer] Access Code: [email or web address] Expires: 11/2/2017 10:29 AM 
 
4. Enter the last four digits of your Social Security Number (xxxx) and Date of Birth (mm/dd/yyyy) as indicated and click Submit. You will be taken to the next sign-up page. 5. Create a [employer] ID. This will be your [employer] login ID and cannot be changed, so think of one that is secure and easy to remember. 6. Create a [employer] password. You can change your password at any time. 7. Enter your Password Reset Question and Answer. This can be used at a later time if you forget your password. 8. Enter your e-mail address. You will receive e-mail notification when new information is available in [address] 9. Click Sign Up. You can now view and download portions of your medical record. 10. Click the Download Summary menu link to download a portable copy of your medical information. If you have questions, please visit the Frequently Asked Questions section of the [employer] website. Remember, [employer] is NOT to be used for urgent needs. For medical emergencies, dial 911. Now available from your iPhone and Android! Please provide this summary of care documentation to your next provider. Your primary care clinician is listed as Tra Parsons. If you have any questions after today's visit, please call [phone].

## 2017-10-28 LAB
BASOPHILS # BLD AUTO: 0.1 X10E3/UL (ref 0–0.2)
BASOPHILS NFR BLD AUTO: 0 %
BUN SERPL-MCNC: 96 MG/DL (ref 8–27)
BUN/CREAT SERPL: 39 (ref 12–28)
CALCIUM SERPL-MCNC: 9.8 MG/DL (ref 8.7–10.3)
CHLORIDE SERPL-SCNC: 93 MMOL/L (ref 96–106)
CO2 SERPL-SCNC: 29 MMOL/L (ref 18–29)
CREAT SERPL-MCNC: 2.48 MG/DL (ref 0.57–1)
EOSINOPHIL # BLD AUTO: 0.2 X10E3/UL (ref 0–0.4)
EOSINOPHIL NFR BLD AUTO: 2 %
ERYTHROCYTE [DISTWIDTH] IN BLOOD BY AUTOMATED COUNT: 16.1 % (ref 12.3–15.4)
GFR SERPLBLD CREATININE-BSD FMLA CKD-EPI: 19 ML/MIN/1.73
GFR SERPLBLD CREATININE-BSD FMLA CKD-EPI: 22 ML/MIN/1.73
GLUCOSE SERPL-MCNC: 205 MG/DL (ref 65–99)
HCT VFR BLD AUTO: 31.3 % (ref 34–46.6)
HGB BLD-MCNC: 10.1 G/DL (ref 11.1–15.9)
IMM GRANULOCYTES # BLD: 0.1 X10E3/UL (ref 0–0.1)
IMM GRANULOCYTES NFR BLD: 1 %
INTERPRETATION: NORMAL
LYMPHOCYTES # BLD AUTO: 3.3 X10E3/UL (ref 0.7–3.1)
LYMPHOCYTES NFR BLD AUTO: 26 %
MCH RBC QN AUTO: 28.5 PG (ref 26.6–33)
MCHC RBC AUTO-ENTMCNC: 32.3 G/DL (ref 31.5–35.7)
MCV RBC AUTO: 88 FL (ref 79–97)
MONOCYTES # BLD AUTO: 0.9 X10E3/UL (ref 0.1–0.9)
MONOCYTES NFR BLD AUTO: 7 %
MORPHOLOGY BLD-IMP: ABNORMAL
NEUTROPHILS # BLD AUTO: 8 X10E3/UL (ref 1.4–7)
NEUTROPHILS NFR BLD AUTO: 64 %
PLATELET # BLD AUTO: 295 X10E3/UL (ref 150–379)
POTASSIUM SERPL-SCNC: 3.7 MMOL/L (ref 3.5–5.2)
RBC # BLD AUTO: 3.55 X10E6/UL (ref 3.77–5.28)
SODIUM SERPL-SCNC: 142 MMOL/L (ref 134–144)
URATE SERPL-MCNC: 16.5 MG/DL (ref 2.5–7.1)
WBC # BLD AUTO: 12.5 X10E3/UL (ref 3.4–10.8)

## 2017-11-02 DIAGNOSIS — I48.0 PAROXYSMAL ATRIAL FIBRILLATION (HCC): Primary | ICD-10-CM

## 2017-11-02 RX ORDER — WARFARIN 1 MG/1
1 TABLET ORAL DAILY
Qty: 30 TAB | Refills: 5 | Status: SHIPPED | OUTPATIENT
Start: 2017-11-02 | End: 2018-01-01 | Stop reason: ALTCHOICE

## 2017-11-02 RX ORDER — WARFARIN SODIUM 5 MG/1
5 TABLET ORAL DAILY
Qty: 30 TAB | Refills: 5
Start: 2017-11-02 | End: 2018-01-01 | Stop reason: SDUPTHER

## 2017-11-02 NOTE — PROGRESS NOTES
INR is 3.9  Spoke with Ms Milka Clinton. Advised that she skip tonight's dose and then resume her Warfarin tomorrow at the lower dose of 6 mg daily. Check the INR in a week. Urged to select and stick with a single provider to manage her warfarin. She has seen Dr Rebeca Pettit and would like to continue to do so. Warfarin 1 mg sent to [facility]  She will take 1mg + 5 mg daily starting tomorrow.     Camilla Brannon

## 2017-11-07 ENCOUNTER — TELEPHONE (OUTPATIENT)
Dept: FAMILY MEDICINE CLINIC | Age: 72
End: 2017-11-07

## 2017-11-10 ENCOUNTER — TELEPHONE (OUTPATIENT)
Dept: FAMILY MEDICINE CLINIC | Age: 72
End: 2017-11-10

## 2017-11-10 NOTE — TELEPHONE ENCOUNTER
Mr Héctor Molina was advised for Mrs Héctor Molina to hold dose for 2 days the restart 6 mg plus to recheck in 1 week.

## 2017-11-14 ENCOUNTER — TELEPHONE (OUTPATIENT)
Dept: FAMILY MEDICINE CLINIC | Age: 72
End: 2017-11-14

## 2017-11-14 DIAGNOSIS — K21.9 GASTROESOPHAGEAL REFLUX DISEASE WITHOUT ESOPHAGITIS: ICD-10-CM

## 2017-11-14 NOTE — TELEPHONE ENCOUNTER
Patient is still desiring a portable O2 tank.  is scheduled for surgery and she needs to while traveling to and from hospital and rehab.

## 2017-11-17 ENCOUNTER — TELEPHONE (OUTPATIENT)
Dept: FAMILY MEDICINE CLINIC | Age: 72
End: 2017-11-17

## 2017-11-17 RX ORDER — FAMOTIDINE 40 MG/1
TABLET, FILM COATED ORAL
Qty: 90 TAB | Refills: 3 | Status: SHIPPED | OUTPATIENT
Start: 2017-11-17 | End: 2018-01-01 | Stop reason: SDUPTHER

## 2017-11-24 ENCOUNTER — TELEPHONE (OUTPATIENT)
Dept: FAMILY MEDICINE CLINIC | Age: 72
End: 2017-11-24

## 2017-11-24 NOTE — TELEPHONE ENCOUNTER
Patient needs portable O2 tank to carry on back when her  has his surgery,[facility] came to her house but told her they need an order for  A conserving device . Deanna Fowler called [phone], to clearify,they are closed until Monday.

## 2017-12-11 ENCOUNTER — TELEPHONE (OUTPATIENT)
Dept: FAMILY MEDICINE CLINIC | Age: 72
End: 2017-12-11

## 2017-12-19 ENCOUNTER — TELEPHONE (OUTPATIENT)
Dept: FAMILY MEDICINE CLINIC | Age: 72
End: 2017-12-19

## 2017-12-28 NOTE — TELEPHONE ENCOUNTER
Patient called to report her results. Today her INR is 1.9 and she is taking 5 mg coumadin 6 days a week.

## 2018-01-01 ENCOUNTER — DOCUMENTATION ONLY (OUTPATIENT)
Dept: FAMILY MEDICINE CLINIC | Age: 73
End: 2018-01-01

## 2018-01-01 ENCOUNTER — OFFICE VISIT (OUTPATIENT)
Dept: FAMILY MEDICINE CLINIC | Age: 73
End: 2018-01-01

## 2018-01-01 ENCOUNTER — TELEPHONE ANTICOAG (OUTPATIENT)
Dept: FAMILY MEDICINE CLINIC | Age: 73
End: 2018-01-01

## 2018-01-01 ENCOUNTER — TELEPHONE (OUTPATIENT)
Dept: FAMILY MEDICINE CLINIC | Age: 73
End: 2018-01-01

## 2018-01-01 ENCOUNTER — TELEPHONE (OUTPATIENT)
Dept: CARDIOLOGY CLINIC | Age: 73
End: 2018-01-01

## 2018-01-01 ENCOUNTER — CLINICAL SUPPORT (OUTPATIENT)
Dept: CARDIOLOGY CLINIC | Age: 73
End: 2018-01-01

## 2018-01-01 ENCOUNTER — OFFICE VISIT (OUTPATIENT)
Dept: CARDIOLOGY CLINIC | Age: 73
End: 2018-01-01

## 2018-01-01 ENCOUNTER — DOCUMENTATION ONLY (OUTPATIENT)
Dept: CARDIOLOGY CLINIC | Age: 73
End: 2018-01-01

## 2018-01-01 VITALS
TEMPERATURE: 96.6 F | DIASTOLIC BLOOD PRESSURE: 80 MMHG | HEIGHT: 58 IN | OXYGEN SATURATION: 93 % | HEART RATE: 77 BPM | RESPIRATION RATE: 20 BRPM | WEIGHT: 210.2 LBS | BODY MASS INDEX: 44.13 KG/M2 | SYSTOLIC BLOOD PRESSURE: 140 MMHG

## 2018-01-01 VITALS
DIASTOLIC BLOOD PRESSURE: 80 MMHG | TEMPERATURE: 97.2 F | RESPIRATION RATE: 28 BRPM | OXYGEN SATURATION: 94 % | HEART RATE: 82 BPM | HEIGHT: 58 IN | WEIGHT: 210.6 LBS | BODY MASS INDEX: 44.21 KG/M2 | SYSTOLIC BLOOD PRESSURE: 126 MMHG

## 2018-01-01 VITALS
WEIGHT: 204 LBS | OXYGEN SATURATION: 92 % | HEART RATE: 81 BPM | DIASTOLIC BLOOD PRESSURE: 60 MMHG | RESPIRATION RATE: 16 BRPM | BODY MASS INDEX: 42.82 KG/M2 | HEIGHT: 58 IN | SYSTOLIC BLOOD PRESSURE: 126 MMHG

## 2018-01-01 VITALS
SYSTOLIC BLOOD PRESSURE: 120 MMHG | DIASTOLIC BLOOD PRESSURE: 64 MMHG | OXYGEN SATURATION: 92 % | HEART RATE: 73 BPM | RESPIRATION RATE: 20 BRPM

## 2018-01-01 VITALS
HEIGHT: 58 IN | RESPIRATION RATE: 18 BRPM | HEART RATE: 72 BPM | WEIGHT: 209 LBS | SYSTOLIC BLOOD PRESSURE: 150 MMHG | BODY MASS INDEX: 43.87 KG/M2 | DIASTOLIC BLOOD PRESSURE: 88 MMHG | OXYGEN SATURATION: 96 %

## 2018-01-01 VITALS
RESPIRATION RATE: 20 BRPM | SYSTOLIC BLOOD PRESSURE: 118 MMHG | HEART RATE: 68 BPM | OXYGEN SATURATION: 95 % | DIASTOLIC BLOOD PRESSURE: 66 MMHG

## 2018-01-01 VITALS
OXYGEN SATURATION: 93 % | HEART RATE: 69 BPM | TEMPERATURE: 97.8 F | SYSTOLIC BLOOD PRESSURE: 130 MMHG | WEIGHT: 208 LBS | DIASTOLIC BLOOD PRESSURE: 68 MMHG | HEIGHT: 58 IN | RESPIRATION RATE: 28 BRPM | BODY MASS INDEX: 43.66 KG/M2

## 2018-01-01 DIAGNOSIS — K64.9 HEMORRHOIDS, UNSPECIFIED HEMORRHOID TYPE: ICD-10-CM

## 2018-01-01 DIAGNOSIS — G47.33 OSA ON CPAP: ICD-10-CM

## 2018-01-01 DIAGNOSIS — K21.9 GASTROESOPHAGEAL REFLUX DISEASE WITHOUT ESOPHAGITIS: ICD-10-CM

## 2018-01-01 DIAGNOSIS — E78.5 HYPERLIPIDEMIA, UNSPECIFIED HYPERLIPIDEMIA TYPE: Primary | ICD-10-CM

## 2018-01-01 DIAGNOSIS — N18.30 HYPERTENSIVE HEART AND CHRONIC KIDNEY DISEASE STAGE 3 (HCC): ICD-10-CM

## 2018-01-01 DIAGNOSIS — N18.30 CKD (CHRONIC KIDNEY DISEASE) STAGE 3, GFR 30-59 ML/MIN (HCC): ICD-10-CM

## 2018-01-01 DIAGNOSIS — R60.0 BILATERAL EDEMA OF LOWER EXTREMITY: ICD-10-CM

## 2018-01-01 DIAGNOSIS — I48.0 PAROXYSMAL ATRIAL FIBRILLATION (HCC): Primary | ICD-10-CM

## 2018-01-01 DIAGNOSIS — I48.0 PAROXYSMAL ATRIAL FIBRILLATION (HCC): ICD-10-CM

## 2018-01-01 DIAGNOSIS — I70.90 ATHEROSCLEROSIS: ICD-10-CM

## 2018-01-01 DIAGNOSIS — E55.9 VITAMIN D DEFICIENCY: ICD-10-CM

## 2018-01-01 DIAGNOSIS — E78.00 PURE HYPERCHOLESTEROLEMIA: ICD-10-CM

## 2018-01-01 DIAGNOSIS — I25.10 ATHEROSCLEROSIS OF NATIVE CORONARY ARTERY OF NATIVE HEART WITHOUT ANGINA PECTORIS: ICD-10-CM

## 2018-01-01 DIAGNOSIS — I50.9 CHRONIC CONGESTIVE HEART FAILURE (HCC): ICD-10-CM

## 2018-01-01 DIAGNOSIS — I50.42 CHRONIC COMBINED SYSTOLIC AND DIASTOLIC HEART FAILURE (HCC): ICD-10-CM

## 2018-01-01 DIAGNOSIS — J42 CHRONIC BRONCHITIS, UNSPECIFIED CHRONIC BRONCHITIS TYPE (HCC): ICD-10-CM

## 2018-01-01 DIAGNOSIS — I10 ESSENTIAL HYPERTENSION, BENIGN: ICD-10-CM

## 2018-01-01 DIAGNOSIS — M1A.3710 CHRONIC GOUT OF RIGHT FOOT DUE TO RENAL IMPAIRMENT WITHOUT TOPHUS: ICD-10-CM

## 2018-01-01 DIAGNOSIS — Z99.89 OSA ON CPAP: ICD-10-CM

## 2018-01-01 DIAGNOSIS — E11.65 UNCONTROLLED TYPE 2 DIABETES MELLITUS WITH COMPLICATION, UNSPECIFIED WHETHER LONG TERM INSULIN USE: ICD-10-CM

## 2018-01-01 DIAGNOSIS — M79.671 RIGHT FOOT PAIN: Primary | ICD-10-CM

## 2018-01-01 DIAGNOSIS — I13.10 HYPERTENSIVE HEART AND CHRONIC KIDNEY DISEASE STAGE 3 (HCC): ICD-10-CM

## 2018-01-01 DIAGNOSIS — L29.9 PRURITIC DERMATITIS: ICD-10-CM

## 2018-01-01 DIAGNOSIS — R73.9 HYPERGLYCEMIA: ICD-10-CM

## 2018-01-01 DIAGNOSIS — E66.01 MORBID OBESITY WITH BMI OF 45.0-49.9, ADULT (HCC): ICD-10-CM

## 2018-01-01 DIAGNOSIS — E11.8 UNCONTROLLED TYPE 2 DIABETES MELLITUS WITH COMPLICATION, UNSPECIFIED WHETHER LONG TERM INSULIN USE: ICD-10-CM

## 2018-01-01 DIAGNOSIS — Z00.00 ENCOUNTER FOR MEDICARE ANNUAL WELLNESS EXAM: ICD-10-CM

## 2018-01-01 DIAGNOSIS — R35.0 URINARY FREQUENCY: ICD-10-CM

## 2018-01-01 DIAGNOSIS — M54.32 LEFT SCIATIC NERVE PAIN: Primary | ICD-10-CM

## 2018-01-01 DIAGNOSIS — R53.1 WEAKNESS: ICD-10-CM

## 2018-01-01 DIAGNOSIS — R00.0 TACHYCARDIA: ICD-10-CM

## 2018-01-01 LAB
BACTERIA UA POCT, BACTPOCT: NORMAL
BILIRUB UR QL STRIP: NEGATIVE
CASTS UA POCT: NORMAL
CHOLEST SERPL-MCNC: 190 MG/DL (ref 100–199)
CHOLEST SERPL-MCNC: 245 MG/DL (ref 100–199)
CLUE CELLS, CLUEPOCT: NORMAL
CREATININE, EXTERNAL: 1.54
CRYSTALS UA POCT, CRYSPOCT: NORMAL
EPITHELIAL CELLS POCT: NORMAL
EST. AVERAGE GLUCOSE BLD GHB EST-MCNC: 143 MG/DL
GLUCOSE UR-MCNC: NEGATIVE MG/DL
HBA1C MFR BLD HPLC: 6.4 %
HBA1C MFR BLD: 6.6 % (ref 4.8–5.6)
HDLC SERPL-MCNC: 50 MG/DL
HDLC SERPL-MCNC: 55 MG/DL
INR BLD: 1.2
INR BLD: 3.5 (ref 2–3)
INR, EXTERNAL: 1.9
KETONES P FAST UR STRIP-MCNC: NEGATIVE MG/DL
LDLC SERPL CALC-MCNC: 115 MG/DL (ref 0–99)
LDLC SERPL CALC-MCNC: 80 MG/DL (ref 0–99)
MUCUS UA POCT, MUCPOCT: NORMAL
PH UR STRIP: 5.5 [PH] (ref 4.6–8)
PROT UR QL STRIP: NORMAL
PT POC: 14.8 SECONDS
PT POC: 41.5 SECONDS (ref 10.4–14)
RBC UA POCT, RBCPOCT: NORMAL
SP GR UR STRIP: 1.01 (ref 1–1.03)
TRICH UA POCT, TRICHPOC: NORMAL
TRIGL SERPL-MCNC: 300 MG/DL (ref 0–149)
TRIGL SERPL-MCNC: 375 MG/DL (ref 0–149)
UA UROBILINOGEN AMB POC: NORMAL (ref 0.2–1)
URINALYSIS CLARITY POC: CLEAR
URINALYSIS COLOR POC: YELLOW
URINE BLOOD POC: NORMAL
URINE CULT COMMENT, POCT: NORMAL
URINE LEUKOCYTES POC: NORMAL
URINE NITRITES POC: NEGATIVE
VALID INTERNAL CONTROL?: YES
VALID INTERNAL CONTROL?: YES
VLDLC SERPL CALC-MCNC: 60 MG/DL (ref 5–40)
VLDLC SERPL CALC-MCNC: 75 MG/DL (ref 5–40)
WBC UA POCT, WBCPOCT: NORMAL
YEAST UA POCT, YEASTPOC: NORMAL

## 2018-01-01 RX ORDER — AZITHROMYCIN 250 MG/1
TABLET, FILM COATED ORAL
COMMUNITY
Start: 2018-01-01 | End: 2018-01-01 | Stop reason: ALTCHOICE

## 2018-01-01 RX ORDER — PREDNISONE 20 MG/1
20 TABLET ORAL
Qty: 5 TAB | Refills: 1 | Status: SHIPPED | OUTPATIENT
Start: 2018-01-01 | End: 2018-01-01 | Stop reason: ALTCHOICE

## 2018-01-01 RX ORDER — ALLOPURINOL 300 MG/1
300 TABLET ORAL DAILY
Qty: 90 TAB | Refills: 3 | Status: SHIPPED | OUTPATIENT
Start: 2018-01-01

## 2018-01-01 RX ORDER — DILTIAZEM HYDROCHLORIDE 120 MG/1
CAPSULE, EXTENDED RELEASE ORAL
Qty: 90 CAP | Refills: 1 | Status: SHIPPED | OUTPATIENT
Start: 2018-01-01 | End: 2018-01-01 | Stop reason: ALTCHOICE

## 2018-01-01 RX ORDER — NITROGLYCERIN 0.4 MG/1
0.4 TABLET SUBLINGUAL
Qty: 25 TAB | Refills: 3 | Status: SHIPPED | OUTPATIENT
Start: 2018-01-01

## 2018-01-01 RX ORDER — ESOMEPRAZOLE MAGNESIUM 40 MG/1
40 CAPSULE, DELAYED RELEASE ORAL DAILY
Qty: 90 CAP | Refills: 1 | Status: SHIPPED | OUTPATIENT
Start: 2018-01-01 | End: 2018-01-01 | Stop reason: SDUPTHER

## 2018-01-01 RX ORDER — HYDROGEN PEROXIDE 3 %
20 SOLUTION, NON-ORAL MISCELLANEOUS DAILY
Qty: 90 CAP | Refills: 3 | Status: SHIPPED | OUTPATIENT
Start: 2018-01-01 | End: 2018-01-01 | Stop reason: ALTCHOICE

## 2018-01-01 RX ORDER — FAMOTIDINE 40 MG/1
TABLET, FILM COATED ORAL
Qty: 90 TAB | Refills: 3 | Status: SHIPPED | OUTPATIENT
Start: 2018-01-01 | End: 2018-01-01

## 2018-01-01 RX ORDER — ATORVASTATIN CALCIUM 20 MG/1
TABLET, FILM COATED ORAL
Qty: 90 TAB | Refills: 2 | Status: SHIPPED | OUTPATIENT
Start: 2018-01-01 | End: 2018-01-01 | Stop reason: SDUPTHER

## 2018-01-01 RX ORDER — HYDROCORTISONE 25 MG/G
CREAM TOPICAL 4 TIMES DAILY
Qty: 30 G | Refills: 0 | Status: SHIPPED | OUTPATIENT
Start: 2018-01-01 | End: 2018-01-01

## 2018-01-01 RX ORDER — IPRATROPIUM BROMIDE AND ALBUTEROL 20; 100 UG/1; UG/1
1 SPRAY, METERED RESPIRATORY (INHALATION) AS NEEDED
COMMUNITY
End: 2018-01-01

## 2018-01-01 RX ORDER — ALBUTEROL SULFATE 90 UG/1
AEROSOL, METERED RESPIRATORY (INHALATION)
COMMUNITY
Start: 2018-01-01 | End: 2018-01-01

## 2018-01-01 RX ORDER — TRIAMCINOLONE ACETONIDE 1 MG/G
OINTMENT TOPICAL 2 TIMES DAILY
Qty: 30 G | Refills: 3 | Status: SHIPPED | OUTPATIENT
Start: 2018-01-01 | End: 2018-01-01 | Stop reason: ALTCHOICE

## 2018-01-01 RX ORDER — METHYLPREDNISOLONE ACETATE 40 MG/ML
80 INJECTION, SUSPENSION INTRA-ARTICULAR; INTRALESIONAL; INTRAMUSCULAR; SOFT TISSUE ONCE
Qty: 2 VIAL | Refills: 0
Start: 2018-01-01 | End: 2018-01-01

## 2018-01-01 RX ORDER — ATORVASTATIN CALCIUM 40 MG/1
40 TABLET, FILM COATED ORAL DAILY
Qty: 90 TAB | Refills: 1 | Status: SHIPPED | OUTPATIENT
Start: 2018-01-01

## 2018-01-01 RX ORDER — ATORVASTATIN CALCIUM 40 MG/1
40 TABLET, FILM COATED ORAL DAILY
Qty: 90 TAB | Refills: 1 | Status: SHIPPED | OUTPATIENT
Start: 2018-01-01 | End: 2018-01-01 | Stop reason: SDUPTHER

## 2018-01-01 RX ORDER — IPRATROPIUM BROMIDE AND ALBUTEROL SULFATE 2.5; .5 MG/3ML; MG/3ML
3 SOLUTION RESPIRATORY (INHALATION) 2 TIMES DAILY
COMMUNITY

## 2018-01-01 RX ORDER — BUMETANIDE 1 MG/1
TABLET ORAL
Qty: 360 TAB | Refills: 4 | Status: SHIPPED | OUTPATIENT
Start: 2018-01-01

## 2018-01-01 RX ORDER — METOPROLOL SUCCINATE 100 MG/1
100 TABLET, EXTENDED RELEASE ORAL DAILY
Qty: 90 TAB | Refills: 3 | Status: SHIPPED | OUTPATIENT
Start: 2018-01-01

## 2018-01-01 RX ORDER — MELATONIN
2000 DAILY
Qty: 180 TAB | Refills: 3
Start: 2018-01-01 | End: 2018-01-01

## 2018-01-01 RX ORDER — WARFARIN SODIUM 5 MG/1
TABLET ORAL
Qty: 117 TAB | Refills: 1 | Status: SHIPPED | OUTPATIENT
Start: 2018-01-01 | End: 2018-01-01 | Stop reason: DRUGHIGH

## 2018-01-01 RX ORDER — AZITHROMYCIN 250 MG/1
250 TABLET, FILM COATED ORAL 4 TIMES DAILY
COMMUNITY
End: 2018-01-01 | Stop reason: ALTCHOICE

## 2018-01-01 RX ORDER — HYDROGEN PEROXIDE 3 %
SOLUTION, NON-ORAL MISCELLANEOUS DAILY
COMMUNITY
End: 2018-01-01 | Stop reason: SDUPTHER

## 2018-01-01 RX ORDER — METOPROLOL TARTRATE 50 MG/1
50 TABLET ORAL 2 TIMES DAILY
Qty: 180 TAB | Refills: 3 | Status: SHIPPED | OUTPATIENT
Start: 2018-01-01 | End: 2018-01-01 | Stop reason: ALTCHOICE

## 2018-01-01 RX ORDER — WARFARIN SODIUM 5 MG/1
TABLET ORAL
Qty: 117 TAB | Refills: 1 | Status: SHIPPED | OUTPATIENT
Start: 2018-01-01

## 2018-01-01 RX ORDER — HYDROGEN PEROXIDE 3 %
20 SOLUTION, NON-ORAL MISCELLANEOUS DAILY
Qty: 90 CAP | Refills: 3 | Status: SHIPPED | OUTPATIENT
Start: 2018-01-01 | End: 2018-01-01 | Stop reason: SDUPTHER

## 2018-01-01 RX ORDER — ALLOPURINOL 100 MG/1
200 TABLET ORAL DAILY
Qty: 180 TAB | Refills: 1 | Status: SHIPPED | OUTPATIENT
Start: 2018-01-01 | End: 2018-01-01 | Stop reason: SDUPTHER

## 2018-01-01 RX ORDER — ISOSORBIDE MONONITRATE 30 MG/1
TABLET, EXTENDED RELEASE ORAL
Qty: 90 TAB | Refills: 3 | Status: SHIPPED | OUTPATIENT
Start: 2018-01-01

## 2018-01-01 RX ORDER — AZITHROMYCIN 250 MG/1
TABLET, FILM COATED ORAL
Qty: 6 TAB | Refills: 0 | Status: SHIPPED | OUTPATIENT
Start: 2018-01-01 | End: 2018-01-01 | Stop reason: ALTCHOICE

## 2018-01-01 RX ORDER — WARFARIN SODIUM 5 MG/1
5 TABLET ORAL DAILY
COMMUNITY
End: 2018-01-01 | Stop reason: SDUPTHER

## 2018-01-12 NOTE — TELEPHONE ENCOUNTER
Patient informed of changes in warfarin dose.  To hold for 48 hours and then take 6 mg daily except 1 day take 5 mg then repeat in one week

## 2018-02-06 NOTE — LETTER
2/6/2018 10:42 AM 
 
Ms. Susie Ca [address] Current Outpatient Prescriptions on File Prior to Visit Medication Sig Dispense Refill  famotidine (PEPCID) 40 mg tablet TAKE 1 TABLET EVERY DAY  FOR  GASTROESOPHAGEAL REFLUX 90 Tab 3  
 metoprolol tartrate (LOPRESSOR) 50 mg tablet Take 1 Tab by mouth two (2) times a day. 180 Tab 3  
 allopurinol (ZYLOPRIM) 100 mg tablet Take 2 Tabs by mouth daily. Indications: Gout 180 Tab 1  
 CARTIA  mg ER capsule TAKE 1 CAPSULE EVERY DAY 90 Cap 1  
 warfarin (COUMADIN) 1 mg tablet Take 1 Tab by mouth daily. 30 Tab 5  warfarin (COUMADIN) 5 mg tablet Take 1 Tab by mouth daily. Indications: AF. 30 Tab 5  
 gabapentin (NEURONTIN) 300 mg capsule  bumetanide (BUMEX) 1 mg tablet Take 2 Tabs by mouth two (2) times a day. Indications: Edema 240 Tab 4  
 metOLazone (ZAROXOLYN) 2.5 mg tablet 2 tablets BID as needed for edema then maintenance dose of 2 tablets daily  Indications: PERIPHERAL EDEMA DUE TO CHRONIC HEART FAILURE 240 Tab 5  
 isosorbide mononitrate ER (IMDUR) 30 mg tablet TAKE 1 TABLET EVERY DAY 90 Tab 3  theophylline ER,12 hour, (THEOCHRON) 450 mg tablet TAKE 1 TALETB BY MOUTH DAILY. YOU NEED BLOOD WORK FOR THIS MEDICATION 90 Tab 3  
 atorvastatin (LIPITOR) 20 mg tablet TAKE 1 TABLET EVERY DAY 90 Tab 2  
 esomeprazole (NEXIUM) 20 mg capsule Take 1 Cap by mouth daily. Indications: GASTROESOPHAGEAL REFLUX 90 Cap 1  
 triamcinolone acetonide (KENALOG) 0.1 % ointment Apply  to affected area two (2) times a day. use thin layer 30 g 3  
 ALPRAZolam (XANAX) 0.5 mg tablet Take 1 Tab by mouth two (2) times daily as needed for Anxiety or Sleep. Max Daily Amount: 1 mg. Indications: ANXIETY 45 Tab 1  
 nystatin (MYCOSTATIN) powder Apply  to affected area three (3) times daily. 60 g 3  
 aspirin 81 mg chewable tablet Take 81 mg by mouth daily. 4 Tablets Daily.  albuterol-ipratropium (DUO-NEB) 2.5 mg-0.5 mg/3 ml nebu 3 mL by Nebulization route every six (6) hours as needed. 90 mL 2  
 OXYGEN-AIR DELIVERY SYSTEMS Take 2 L by inhalation continuous.  cholecalciferol (VITAMIN D3) 1,000 unit tablet Take 1 Tab by mouth daily. (Patient taking differently: Take 2,000 Units by mouth daily. Indications: VITAMIN D DEFICIENCY) 30 Tab 7  
 nitroglycerin (NITROSTAT) 0.4 mg SL tablet 0.4 mg by SubLINGual route every five (5) minutes as needed. No current facility-administered medications on file prior to visit.

## 2018-02-13 NOTE — MR AVS SNAPSHOT
[facility] 
 
 
 [address] 
[phone] Patient: Ulises Berumen MRN: [id number] Visit Information Date & Time Provider Department Dept. Phone Encounter #  
 2/13/2018  8:30 AM Clarice Reese MD [address] [place] [address] [phone] [id number] Follow-up Instructions Return if symptoms worsen or fail to improve. Your Appointments 3/22/2018  9:50 AM  
ESTABLISHED PATIENT with Yamilet Bass MD  
[address] [place] ([facility]) Appt Note: 6 mo F/U  
 [address] [facility] [address]  
[address] [facility] [address] [facility] [address] 4/11/2018 11:00 AM  
6 MONTH with Michelet Rodriguez MD  
[address] [place] [facility]) Appt Note: Per Dr. Aileen Kline [address] [facility] [address] [id number] [phone]  
  
   
 [address] [place] [id number] Upcoming Health Maintenance Date Due Hepatitis C Screening 1945 FOBT Q 1 YEAR AGE 50-75 11/13/1996 EYE EXAM RETINAL OR DILATED Q1 12/9/2014 GLAUCOMA SCREENING Q2Y 12/9/2015 DTaP/Tdap/Td series (1 - Tdap) 10/12/2017 HEMOGLOBIN A1C Q6M 3/21/2018 LIPID PANEL Q1 4/21/2018 MEDICARE YEARLY EXAM 4/22/2018 FOOT EXAM Q1 9/25/2018 MICROALBUMIN Q1 10/10/2018 BREAST CANCER SCRN MAMMOGRAM 3/21/2019 Allergies as of 2/13/2018  Review Complete On: 2/13/2018 By: Clarice Reese MD  
  
 Severity Noted Reaction Type Reactions Tramadol Medium 07/29/2013    Hives, Rash Pcn [Penicillins]  10/05/2015    Rash, Nausea and Vomiting Shellfish Containing Products  08/17/2012    Shortness of Breath Statins-hmg-coa Reductase Inhibitors  01/27/2015   Side Effect Myalgia Current Immunizations  Reviewed on 4/21/2017 Name Date Influenza High Dose Vaccine PF 9/21/2017, 9/26/2016, 11/17/2015 Influenza Vaccine 11/8/2013  1:28 PM  
 Pneumococcal Conjugate (PCV-13) 12/20/2015 Pneumococcal Polysaccharide (PPSV-23) 11/8/2013  1:30 PM  
 Td, Adsorbed 10/11/2017 Zoster Vaccine, Live 1/1/2010 Not reviewed this visit You Were Diagnosed With   
  
 Codes Comments Left sciatic nerve pain    -  Primary ICD-10-CM: M54.32 
ICD-9-CM: 724.3 Vitals BP Pulse Resp SpO2 OB Status Smoking Status 118/66 (BP 1 Location: Left arm, BP Patient Position: Sitting) 68 20 95% Hysterectomy Former Smoker Preferred Pharmacy Pharmacy Name Phone [address] [facility] [phone] Your Updated Medication List  
  
   
This list is accurate as of: 2/13/18  9:35 AM.  Always use your most recent med list.  
  
  
  
  
 ACETAMINOPHEN EXTRA STRENGTH PO Take  by mouth. albuterol-ipratropium 2.5 mg-0.5 mg/3 ml Nebu Commonly known as:  DUO-NEB  
3 mL by Nebulization route every six (6) hours as needed. allopurinol 100 mg tablet Commonly known as:  Lyndel Speedy Take 2 Tabs by mouth daily. Indications: Gout ALPRAZolam 0.5 mg tablet Commonly known as:  Lugenia Mohs Take 1 Tab by mouth two (2) times daily as needed for Anxiety or Sleep. Max Daily Amount: 1 mg. Indications: ANXIETY  
  
 aspirin 81 mg chewable tablet Take 81 mg by mouth daily. 4 Tablets Daily. atorvastatin 20 mg tablet Commonly known as:  LIPITOR  
TAKE 1 TABLET EVERY DAY  
  
 bumetanide 1 mg tablet Commonly known as:  Christia Ovens Take 2 Tabs by mouth two (2) times a day. Indications: Edema CARTIA  mg ER capsule Generic drug:  dilTIAZem CD  
TAKE 1 CAPSULE EVERY DAY  
  
 cholecalciferol 1,000 unit tablet Commonly known as:  VITAMIN D3 Take 1 Tab by mouth daily. esomeprazole 20 mg capsule Commonly known as:  NexIUM Take 1 Cap by mouth daily. Indications: GASTROESOPHAGEAL REFLUX  
  
 famotidine 40 mg tablet Commonly known as:  PEPCID  
TAKE 1 TABLET EVERY DAY  FOR  GASTROESOPHAGEAL REFLUX  
  
 gabapentin 300 mg capsule Commonly known as:  NEURONTIN  
  
 isosorbide mononitrate ER 30 mg tablet Commonly known as:  IMDUR  
TAKE 1 TABLET EVERY DAY  
  
 methylPREDNISolone acetate 40 mg/mL injection Commonly known as:  DEPO-Medrol 2 mL by IntraMUSCular route once for 1 dose. metOLazone 2.5 mg tablet Commonly known as:  ZAROXOLYN  
2 tablets BID as needed for edema then maintenance dose of 2 tablets daily  Indications: PERIPHERAL EDEMA DUE TO CHRONIC HEART FAILURE  
  
 metoprolol tartrate 50 mg tablet Commonly known as:  LOPRESSOR Take 1 Tab by mouth two (2) times a day. NITROSTAT 0.4 mg SL tablet Generic drug:  nitroglycerin 0.4 mg by SubLINGual route every five (5) minutes as needed. nystatin powder Commonly known as:  MYCOSTATIN Apply  to affected area three (3) times daily. OXYGEN-AIR DELIVERY SYSTEMS Take 2 L by inhalation continuous. predniSONE 20 mg tablet Commonly known as:  Derryl Situ Take 1 Tab by mouth daily (with breakfast). theophylline ER(12 hour) 450 mg tablet Commonly known as:  THEOCHRON  
TAKE 1 TALETB BY MOUTH DAILY. YOU NEED BLOOD WORK FOR THIS MEDICATION  
  
 triamcinolone acetonide 0.1 % ointment Commonly known as:  KENALOG Apply  to affected area two (2) times a day. use thin layer * warfarin 1 mg tablet Commonly known as:  COUMADIN Take 1 Tab by mouth daily. * warfarin 5 mg tablet Commonly known as:  COUMADIN Take 1 Tab by mouth daily. Indications: AF.  
  
 * Notice: This list has 2 medication(s) that are the same as other medications prescribed for you. Read the directions carefully, and ask your doctor or other care provider to review them with you. Prescriptions Sent to Pharmacy Refills predniSONE (DELTASONE) 20 mg tablet 1 Sig: Take 1 Tab by mouth daily (with breakfast). Class: Normal  
 Pharmacy: [facility] [address] [facility] [address] Ph #: [phone] Route: Oral  
  
We Performed the Following METHYLPREDNISOLONE ACETATE INJECTION 80 MG [ HCP] THER/PROPH/DIAG INJECTION, SUBCUT/IM [id number] CPT(R)] Follow-up Instructions Return if symptoms worsen or fail to improve. Introducing [facility]! [facility] introduces [employer] patient portal. Now you can access parts of your medical record, email your doctor's office, and request medication refills online. 1. In your internet browser, go to [email or web address] 2. Click on the First Time User? Click Here link in the Sign In box. You will see the New Member Sign Up page. 3. Enter your [employer] Access Code exactly as it appears below. You will not need to use this code after youve completed the sign-up process. If you do not sign up before the expiration date, you must request a new code. · [employer] Access Code: [email or web address] Expires: 5/14/2018  9:35 AM 
 
4. Enter the last four digits of your Social Security Number (xxxx) and Date of Birth (mm/dd/yyyy) as indicated and click Submit. You will be taken to the next sign-up page. 5. Create a Primeloop ID. This will be your [employer]el[employer] login ID and cannot be changed, so think of one that is secure and easy to remember. 6. Create a [employer] password. You can change your password at any time. 7. Enter your Password Reset Question and Answer. This can be used at a later time if you forget your password. 8. Enter your e-mail address. You will receive e-mail notification when new information is available in [address] 9. Click Sign Up. You can now view and download portions of your medical record. 10. Click the Download Summary menu link to download a portable copy of your medical information. If you have questions, please visit the Frequently Asked Questions section of the [employer] website. Remember, [employer] is NOT to be used for urgent needs. For medical emergencies, dial 911. Now available from your iPhone and Android! Please provide this summary of care documentation to your next provider. Your primary care clinician is listed as Willow Junes. If you have any questions after today's visit, please call [phone].

## 2018-02-13 NOTE — PROGRESS NOTES
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 She has 2 grown children in [address] [place] [address]. There is a history of COPD and LLE sciatica--the latter has flared over the past 3 days without clear precipitant. No rash. Pain and numbness are felt on the outside part of the thigh and lower leg. She has previously responded well to a steroid injection for this problem. Allergies   Allergen Reactions    Tramadol Hives and Rash    Pcn [Penicillins] Rash and Nausea and Vomiting    Shellfish Containing Products Shortness of Breath    Statins-Hmg-Coa Reductase Inhibitors Myalgia       Current Outpatient Prescriptions   Medication Sig    ACETAMINOPHEN EXTRA STRENGTH PO Take  by mouth.  famotidine (PEPCID) 40 mg tablet TAKE 1 TABLET EVERY DAY  FOR  GASTROESOPHAGEAL REFLUX    metoprolol tartrate (LOPRESSOR) 50 mg tablet Take 1 Tab by mouth two (2) times a day.  allopurinol (ZYLOPRIM) 100 mg tablet Take 2 Tabs by mouth daily. Indications: Gout    CARTIA  mg ER capsule TAKE 1 CAPSULE EVERY DAY    bumetanide (BUMEX) 1 mg tablet Take 2 Tabs by mouth two (2) times a day. Indications: Edema    isosorbide mononitrate ER (IMDUR) 30 mg tablet TAKE 1 TABLET EVERY DAY    theophylline ER,12 hour, (THEOCHRON) 450 mg tablet TAKE 1 TALETB BY MOUTH DAILY. YOU NEED BLOOD WORK FOR THIS MEDICATION    atorvastatin (LIPITOR) 20 mg tablet TAKE 1 TABLET EVERY DAY    esomeprazole (NEXIUM) 20 mg capsule Take 1 Cap by mouth daily. Indications: GASTROESOPHAGEAL REFLUX    triamcinolone acetonide (KENALOG) 0.1 % ointment Apply  to affected area two (2) times a day. use thin layer    ALPRAZolam (XANAX) 0.5 mg tablet Take 1 Tab by mouth two (2) times daily as needed for Anxiety or Sleep. Max Daily Amount: 1 mg. Indications: ANXIETY    aspirin 81 mg chewable tablet Take 81 mg by mouth daily.  4 Tablets Daily.    albuterol-ipratropium (DUO-NEB) 2.5 mg-0.5 mg/3 ml nebu 3 mL by Nebulization route every six (6) hours as needed.  OXYGEN-AIR DELIVERY SYSTEMS Take 2 L by inhalation continuous.  cholecalciferol (VITAMIN D3) 1,000 unit tablet Take 1 Tab by mouth daily. (Patient taking differently: Take 2,000 Units by mouth daily. Indications: VITAMIN D DEFICIENCY)    warfarin (COUMADIN) 1 mg tablet Take 1 Tab by mouth daily.  warfarin (COUMADIN) 5 mg tablet Take 1 Tab by mouth daily. Indications: AF.    gabapentin (NEURONTIN) 300 mg capsule     metOLazone (ZAROXOLYN) 2.5 mg tablet 2 tablets BID as needed for edema then maintenance dose of 2 tablets daily  Indications: PERIPHERAL EDEMA DUE TO CHRONIC HEART FAILURE    nystatin (MYCOSTATIN) powder Apply  to affected area three (3) times daily.  nitroglycerin (NITROSTAT) 0.4 mg SL tablet 0.4 mg by SubLINGual route every five (5) minutes as needed. No current facility-administered medications for this visit. Past Medical History:   Diagnosis Date    Adjustment disorder with depressed mood 2009    Allergic rhinitis due to other allergen 2011    Anemia, unspecified 2009    COPD     Coronary atherosclerosis of native coronary artery     Essential hypertension, benign 2008    GERD (gastroesophageal reflux disease)     Gout, unspecified 2008    Heart failure ([facility] [address]     hx of, asymptomic now    Impaired glucose tolerance test 2012    Morbid obesity with BMI of 45.0-49.9, adult ([facility] 1/23/2015    Osteoporosis, unspecified 2009    Other and unspecified hyperlipidemia 2008    Unspecified hereditary and idiopathic peripheral neuropathy 2010    Unspecified vitamin D deficiency 2009         ROS:  Denies fever, chills, cough, chest pain, SOB,  nausea, vomiting, or diarrhea. Denies wt loss, wt gain, hemoptysis, hematochezia or melena.     Physical Examination:    /66 (BP 1 Location: Left arm, BP Patient Position: Sitting)  Pulse 68  Resp 20 SpO2 95%    General: Alert and Ox3, Fluent speech  HEENT:  NC/AT, EOMI, OP: clear  Neck:  Supple, no adenopathy, JVD, mass or bruit  Chest:  Clear to Ausculation, without wheezes, rales, rubs or ronchi  Cardiac: RRR  Abdomen:  +BS, soft, nontender without palpable HSM  Extremities:  No cyanosis, clubbing or edema  Neurologic:  Ambulatory without assist, CN 2-12 grossly intact. Moves all extremities. Skin: no rash  Lymphadenopathy: no cervical or supraclavicular nodes      ASSESSMENT AND PLAN:     1.  Sciatica:  Depo Medrol and a short steroid taper. If sx do not improve over the next 2 weeks, we will reevaluate. Orders Placed This Encounter    ACETAMINOPHEN EXTRA STRENGTH PO     Sig: Take  by mouth.        Jerry Amaya MD, [address]

## 2018-03-28 NOTE — TELEPHONE ENCOUNTER
Spoke with Mg Lott with [facility] they have gotten orders and stated they would deliver to patient tomorrow Thursday 3/29/18.

## 2018-03-30 NOTE — PROGRESS NOTES
Provider on call received critical value of INR 5.2. Patient has already taken her dose for the evening.  She has an appointment with MEME Paris in the morning

## 2018-03-30 NOTE — PROGRESS NOTES
Subjective: Lindsey Avila is a 71 y.o. female who presents today with the following:  Chief Complaint   Patient presents with    Foot Pain     Right. Zhen Rivera dropped a non discript item on her right  foot 2 weeks ago . She also ran into a shopping cart and re-ninjured her foot. Both feet are swollen bought compression stockings came in yesterday. She plnas on getting better shoes with support    Anticoagulation. Dx:  Atrial fibrillation. Current symptoms: none  Current control agent: B-blocker  Current anticoagulant: warfarin6 mg daily starting on Sunday (hold x 48 hours)  History of bleeding problems: none  Lab Results   Component Value Date/Time    INR, External 5.9 09/28/2017    INR, External 2.6 09/14/2017    INR, External 1.6 09/08/2017    INR POC 3.5 (A) 03/30/2018 09:45 AM    INR POC 2.3 10/10/2017 04:00 PM    INR POC 4.1 09/21/2017 11:24 AM     Anticoagulation. Dx:  Atrial fibrillation.    Current symptoms: none  Current control agent: B-blocker  Current anticoagulant: warfarin6 mg daily hold for 2 days restart on Sunday  History of bleeding problems: none  Lab Results   Component Value Date/Time    INR, External 5.9 09/28/2017    INR, External 2.6 09/14/2017    INR, External 1.6 09/08/2017    INR POC 3.5 (A) 03/30/2018 09:45 AM    INR POC 2.3 10/10/2017 04:00 PM    INR POC 4.1 09/21/2017 11:24 AM       Patient Active Problem List   Diagnosis Code    COPD (chronic obstructive pulmonary disease) ([facility]) J44.9    GERD (gastroesophageal reflux disease) K21.9    Essential hypertension, benign I10    Hyperlipidemia E78.5    Gout M10.9    Osteoporosis M81.0    Anemia D64.9    Morbid obesity with BMI of 45.0-49.9, adult ([facility]) E66.01, Z68.42    Coronary atherosclerosis of native coronary artery I25.10    Heart failure ([facility]) I50.9    Paroxysmal atrial fibrillation ([facility]) I48.0    Long term current use of anticoagulant Z79.01    Bronchitis J40    Pruritic dermatitis L29.9    CYRIL on CPAP G47.33, Z99.89    Dry skin dermatitis L85.3    Chronic gout of multiple sites M1A. 200X0    CKD (chronic kidney disease) stage 3, GFR 30-59 ml/min N18.3    Pain in both feet M79.671, M79.672         COMPLIANT WITH MEDICATION:   HTN; Denies chest pain, dyspnea, palpitations, headache and blurred vision. Blood pressure normotensive. ROS:  Gen: denies fever, chills, fatigue, weight loss, weight gain  HEENT:denies blurry vision, nasal congestion, sore throat  Resp: denies dypsnea, cough, wheezing  CV: denies chest pain radiating to the jaws or arms, palpitations, lower extremity edema  Abd: denies nausea, vomiting, diarrhea, constipation  Neuro: denies numbness/tingling  Endo: denies polyuria, polydipsia, heat/cold intolerance  Heme: no lymphadenopathy    Allergies   Allergen Reactions    Tramadol Hives and Rash    Pcn [Penicillins] Rash and Nausea and Vomiting    Shellfish Containing Products Shortness of Breath    Statins-Hmg-Coa Reductase Inhibitors Myalgia         Current Outpatient Prescriptions:     warfarin (COUMADIN) 5 mg tablet, Take 5 mg by mouth daily. , Disp: , Rfl:     atorvastatin (LIPITOR) 20 mg tablet, TAKE 1 TABLET EVERY DAY, Disp: 90 Tab, Rfl: 2    ACETAMINOPHEN EXTRA STRENGTH PO, Take  by mouth., Disp: , Rfl:     predniSONE (DELTASONE) 20 mg tablet, Take 1 Tab by mouth daily (with breakfast). , Disp: 5 Tab, Rfl: 1    nitroglycerin (NITROSTAT) 0.4 mg SL tablet, 1 Tab by SubLINGual route every five (5) minutes as needed. , Disp: 25 Tab, Rfl: 3    famotidine (PEPCID) 40 mg tablet, TAKE 1 TABLET EVERY DAY  FOR  GASTROESOPHAGEAL REFLUX, Disp: 90 Tab, Rfl: 3    metoprolol tartrate (LOPRESSOR) 50 mg tablet, Take 1 Tab by mouth two (2) times a day., Disp: 180 Tab, Rfl: 3    allopurinol (ZYLOPRIM) 100 mg tablet, Take 2 Tabs by mouth daily.  Indications: Gout, Disp: 180 Tab, Rfl: 1    CARTIA  mg ER capsule, TAKE 1 CAPSULE EVERY DAY, Disp: 90 Cap, Rfl: 1    gabapentin (NEURONTIN) 300 mg capsule, , Disp: , Rfl:     bumetanide (BUMEX) 1 mg tablet, Take 2 Tabs by mouth two (2) times a day. Indications: Edema, Disp: 240 Tab, Rfl: 4    metOLazone (ZAROXOLYN) 2.5 mg tablet, 2 tablets BID as needed for edema then maintenance dose of 2 tablets daily  Indications: PERIPHERAL EDEMA DUE TO CHRONIC HEART FAILURE, Disp: 240 Tab, Rfl: 5    isosorbide mononitrate ER (IMDUR) 30 mg tablet, TAKE 1 TABLET EVERY DAY, Disp: 90 Tab, Rfl: 3    theophylline ER,12 hour, (THEOCHRON) 450 mg tablet, TAKE 1 TALETB BY MOUTH DAILY. YOU NEED BLOOD WORK FOR THIS MEDICATION, Disp: 90 Tab, Rfl: 3    esomeprazole (NEXIUM) 20 mg capsule, Take 1 Cap by mouth daily. Indications: GASTROESOPHAGEAL REFLUX, Disp: 90 Cap, Rfl: 1    triamcinolone acetonide (KENALOG) 0.1 % ointment, Apply  to affected area two (2) times a day. use thin layer, Disp: 30 g, Rfl: 3    ALPRAZolam (XANAX) 0.5 mg tablet, Take 1 Tab by mouth two (2) times daily as needed for Anxiety or Sleep. Max Daily Amount: 1 mg. Indications: ANXIETY, Disp: 45 Tab, Rfl: 1    nystatin (MYCOSTATIN) powder, Apply  to affected area three (3) times daily. , Disp: 60 g, Rfl: 3    aspirin 81 mg chewable tablet, Take 81 mg by mouth daily. 4 Tablets Daily. , Disp: , Rfl:     albuterol-ipratropium (DUO-NEB) 2.5 mg-0.5 mg/3 ml nebu, 3 mL by Nebulization route every six (6) hours as needed. , Disp: 90 mL, Rfl: 2    OXYGEN-AIR DELIVERY SYSTEMS, Take 2 L by inhalation continuous. , Disp: , Rfl:     cholecalciferol (VITAMIN D3) 1,000 unit tablet, Take 1 Tab by mouth daily. (Patient taking differently: Take 2,000 Units by mouth daily.  Indications: VITAMIN D DEFICIENCY), Disp: 30 Tab, Rfl: 7    Past Medical History:   Diagnosis Date    Adjustment disorder with depressed mood 2009    Allergic rhinitis due to other allergen 2011    Anemia, unspecified 2009    COPD     Coronary atherosclerosis of native coronary artery     Essential hypertension, benign 2008    GERD (gastroesophageal reflux disease)     Gout, unspecified 2008    Heart failure ([facility] [address]     hx of, asymptomic now    Impaired glucose tolerance test 2012    Morbid obesity with BMI of 45.0-49.9, adult ([address] 1/23/2015    Osteoporosis, unspecified 2009    Other and unspecified hyperlipidemia 2008    Unspecified hereditary and idiopathic peripheral neuropathy 2010    Unspecified vitamin D deficiency 2009       Past Surgical History:   Procedure Laterality Date    HX APPENDECTOMY      HX COLONOSCOPY      HX CORONARY STENT PLACEMENT  2000    LCx    HX HEART CATHETERIZATION  2000    HX HEART CATHETERIZATION  2012    LVEWDP 11, PCWP 11, Inf Hypo EF 45%, LAD 30%, mRCA 100% R to R and L to R collaterals    HX PTCA  2012    Unsuccessful attempt to opne RCA    HX ROBERT AND BSO         History   Smoking Status    Former Smoker    Packs/day: 1.00    Years: 40.00    Quit date: 12/1/2013   Smokeless Tobacco    Never Used       Social History     Social History    Marital status:      Spouse name: N/A    Number of children: N/A    Years of education: N/A     Social History Main Topics    Smoking status: Former Smoker     Packs/day: 1.00     Years: 40.00     Quit date: 12/1/2013    Smokeless tobacco: Never Used    Alcohol use No    Drug use: No    Sexual activity: Not Asked     Other Topics Concern     Service No    Blood Transfusions Yes    Caffeine Concern No    Occupational Exposure No    Hobby Hazards No    Sleep Concern Yes    Stress Concern No    Weight Concern Yes    Special Diet No    Back Care Yes    Exercise No    Bike Helmet Yes    Seat Belt Yes    Self-Exams Yes     Social History Narrative       Family History   Problem Relation Age of Onset   [facility] Arthritis-osteo Mother     Diabetes Mother     Heart Disease Mother     Lung Disease Mother     Hypertension Sister     Asthma Sister     Asthma Brother     Heart Disease Maternal Grandmother     No Known Problems Brother     No Known Problems Sister     Kidney Disease Daughter     Lupus Daughter          Objective:     Visit Vitals    /80 (BP 1 Location: Left arm, BP Patient Position: Sitting)    Pulse 77    Temp 96.6 °F (35.9 °C) (Temporal)    Resp 20    Ht 4' 10\" (1.473 m)    Wt 210 lb 3.2 oz (95.3 kg)    SpO2 93%    BMI 43.93 kg/m2     Body mass index is 43.93 kg/(m^2). General: Alert and oriented. No acute distress. Well nourished  HEENT :  Ears:TMs are normal. Canals are clear. Eyes: pupils equal, round, react to light and accommodation. Extra ocular movements intact. Nose: patent. Mouth and throat is clear. Neck:supple full range of motion no thyromegaly. Trachea midline, No carotid bruits. No significant lymphadenopathy  Lungs[de-identified] clear to auscultation without wheezes, rales, or rhonchi. Heart :RRR, S1 & S2 are normal intensity. No murmur; no gallop  Abdomen: bowel sounds active. No tenderness, guarding, rebound, masses, hepatic or spleen enlargement  Back: no CVA tenderness. Extremities: without clubbing, cyanosis, 2+ pedal edema  Pulses: radial and femoral pulses are normal  Neuro: HMF intact. Cranial nerves II through XII grossly normal.  Motor: is 5 over 5 and symmetrical.   Deep tendon reflexes: +2 equal    Results for orders placed or performed in visit on 03/30/18   AMB POC PT/INR   Result Value Ref Range    VALID INTERNAL CONTROL POC Yes     Prothrombin time (POC) 41.5 (A) 10.4 - 14 seconds    INR POC 3.5 (A) 2 - 3       Results for orders placed or performed in visit on 03/30/18   AMB POC PT/INR   Result Value Ref Range    VALID INTERNAL CONTROL POC Yes     Prothrombin time (POC) 41.5 (A) 10.4 - 14 seconds    INR POC 3.5 (A) 2 - 3       Assessment/ Plan:     1. Paroxysmal atrial fibrillation (HCC)    - AMB POC PT/INR  - COLLECTION CAPILLARY BLOOD SPECIMEN  Hold coumadin x 2 days restart 6 mg daily on Sunday. 2. Right foot pain    - XR FOOT RT MIN 3 V; Future  Compression stockings 12 hours per day .  Elevate lower extremities    Orders Placed This Encounter    COLLECTION CAPILLARY BLOOD SPECIMEN    XR FOOT RT MIN 3 V     Standing Status:   Future     Number of Occurrences:   1     Standing Expiration Date:   4/30/2019     Order Specific Question:   Reason for Exam     Answer:   item fell on her foot 2 weeks ago. Shopping cart bumped into the same.  AMB POC PT/INR    warfarin (COUMADIN) 5 mg tablet     Sig: Take 5 mg by mouth daily. Verbal and written instructions (see AVS) provided.  Patient expresses understanding of diagnosis and treatment plan. Health Maintenance Due   Topic Date Due    Hepatitis C Screening  1945    FOBT Q 1 YEAR AGE 50-75  11/13/1996    EYE EXAM RETINAL OR DILATED Q1  12/09/2014    GLAUCOMA SCREENING Q2Y  12/09/2015    DTaP/Tdap/Td series (1 - Tdap) 10/12/2017    HEMOGLOBIN A1C Q6M  03/21/2018    LIPID PANEL Q1  04/21/2018         Follow-up Disposition:  Return in about 3 months (around 6/30/2018).       RADHA Cardenas

## 2018-03-30 NOTE — PROGRESS NOTES
Follow up for anticoagulation.   Indication: Atrial fibrillation  Current medication:  warfarin5 mg daily  Current symptoms: none  Current control agent: B-blocker  Current anticoagulant: warfarin5 mg daily  History of bleeding problems: No  Results for orders placed or performed in visit on 03/30/18   AMB POC PT/INR   Result Value Ref Range    VALID INTERNAL CONTROL POC Yes     Prothrombin time (POC) 41.5 (A) 10.4 - 14 seconds    INR POC 3.5 (A) 2 - 3

## 2018-03-30 NOTE — MR AVS SNAPSHOT
[address] [address] [address] [place] [address] 
[phone] Patient: Armand Begum MRN: [id number]:[id number] Visit Information Date & Time Provider Department Dept. Phone Encounter #  
 3/30/2018  9:30 AM Lluvia Bear, [facility] [address] [phone] [id number] Your Appointments 4/11/2018 11:00 AM  
6 MONTH with Taina Cook MD  
[address] [facility] [facility]) Appt Note: Per Dr. Soriano Matter [address] [facility] [address] [address] [phone]  
  
   
 [address] [address] Upcoming Health Maintenance Date Due Hepatitis C Screening 1945 FOBT Q 1 YEAR AGE 50-75 11/13/1996 EYE EXAM RETINAL OR DILATED Q1 12/9/2014 GLAUCOMA SCREENING Q2Y 12/9/2015 DTaP/Tdap/Td series (1 - Tdap) 10/12/2017 HEMOGLOBIN A1C Q6M 3/21/2018 LIPID PANEL Q1 4/21/2018 MEDICARE YEARLY EXAM 4/22/2018 FOOT EXAM Q1 9/25/2018 MICROALBUMIN Q1 10/10/2018 BREAST CANCER SCRN MAMMOGRAM 3/21/2019 Allergies as of 3/30/2018  Review Complete On: 3/30/2018 By: Lucy Bond LPN Severity Noted Reaction Type Reactions Tramadol Medium 07/29/2013    Hives, Rash Pcn [Penicillins]  10/05/2015    Rash, Nausea and Vomiting Shellfish Containing Products  08/17/2012    Shortness of Breath Statins-hmg-coa Reductase Inhibitors  01/27/2015   Side Effect Myalgia Current Immunizations  Reviewed on 4/21/2017 Name Date Influenza High Dose Vaccine PF 9/21/2017, 9/26/2016, 11/17/2015 Influenza Vaccine 11/8/2013  1:28 PM  
 Pneumococcal Conjugate (PCV-13) 12/20/2015 Pneumococcal Polysaccharide (PPSV-23) 11/8/2013  1:30 PM  
 Td, Adsorbed 10/11/2017 Zoster Vaccine, Live 1/1/2010 Not reviewed this visit You Were Diagnosed With   
  
 Codes Comments Right foot pain    -  Primary ICD-10-CM: A97.728 ICD-9-CM: 729.5 Paroxysmal atrial fibrillation (HCC)     ICD-10-CM: I48.0 ICD-9-CM: 427.31 Vitals BP Pulse Temp Resp Height(growth percentile) 140/80 (BP 1 Location: Left arm, BP Patient Position: Sitting) 77 96.6 °F (35.9 °C) (Temporal) 20 4' 10\" (1.473 m) Weight(growth percentile) SpO2 BMI OB Status Smoking Status 210 lb 3.2 oz (95.3 kg) 93% 43.93 kg/m2 Hysterectomy Former Smoker BMI and BSA Data Body Mass Index Body Surface Area  
 43.93 kg/m 2 1.97 m 2 Preferred Pharmacy Pharmacy Name Phone [facility] [phone] Your Updated Medication List  
  
   
This list is accurate as of 3/30/18 10:32 AM.  Always use your most recent med list.  
  
  
  
  
 ACETAMINOPHEN EXTRA STRENGTH PO Take  by mouth. albuterol-ipratropium 2.5 mg-0.5 mg/3 ml Nebu Commonly known as:  DUO-NEB  
3 mL by Nebulization route every six (6) hours as needed. allopurinol 100 mg tablet Commonly known as:  Kathe Gallus Take 2 Tabs by mouth daily. Indications: Gout ALPRAZolam 0.5 mg tablet Commonly known as:  Doyal Garden Take 1 Tab by mouth two (2) times daily as needed for Anxiety or Sleep. Max Daily Amount: 1 mg. Indications: ANXIETY  
  
 aspirin 81 mg chewable tablet Take 81 mg by mouth daily. 4 Tablets Daily. atorvastatin 20 mg tablet Commonly known as:  LIPITOR  
TAKE 1 TABLET EVERY DAY  
  
 bumetanide 1 mg tablet Commonly known as:  [place] Sayer Take 2 Tabs by mouth two (2) times a day. Indications: Edema CARTIA  mg ER capsule Generic drug:  dilTIAZem CD  
TAKE 1 CAPSULE EVERY DAY  
  
 cholecalciferol 1,000 unit tablet Commonly known as:  VITAMIN D3 Take 1 Tab by mouth daily. esomeprazole 20 mg capsule Commonly known as:  NexIUM Take 1 Cap by mouth daily. Indications: GASTROESOPHAGEAL REFLUX  
  
 famotidine 40 mg tablet Commonly known as:  PEPCID  
 TAKE 1 TABLET EVERY DAY  FOR  GASTROESOPHAGEAL REFLUX  
  
 gabapentin 300 mg capsule Commonly known as:  NEURONTIN  
  
 isosorbide mononitrate ER 30 mg tablet Commonly known as:  IMDUR  
TAKE 1 TABLET EVERY DAY  
  
 metOLazone 2.5 mg tablet Commonly known as:  ZAROXOLYN  
2 tablets BID as needed for edema then maintenance dose of 2 tablets daily  Indications: PERIPHERAL EDEMA DUE TO CHRONIC HEART FAILURE  
  
 metoprolol tartrate 50 mg tablet Commonly known as:  LOPRESSOR Take 1 Tab by mouth two (2) times a day. nitroglycerin 0.4 mg SL tablet Commonly known as:  NITROSTAT  
1 Tab by SubLINGual route every five (5) minutes as needed. nystatin powder Commonly known as:  MYCOSTATIN Apply  to affected area three (3) times daily. OXYGEN-AIR DELIVERY SYSTEMS Take 2 L by inhalation continuous. predniSONE 20 mg tablet Commonly known as:  Anjali Passy Take 1 Tab by mouth daily (with breakfast). theophylline ER(12 hour) 450 mg tablet Commonly known as:  THEOCHRON  
TAKE 1 TALETB BY MOUTH DAILY. YOU NEED BLOOD WORK FOR THIS MEDICATION  
  
 triamcinolone acetonide 0.1 % ointment Commonly known as:  KENALOG Apply  to affected area two (2) times a day. use thin layer  
  
 warfarin 5 mg tablet Commonly known as:  COUMADIN Take 5 mg by mouth daily. We Performed the Following AMB POC PT/INR [61510 CPT(R)] COLLECTION CAPILLARY BLOOD SPECIMEN [35858 CPT(R)] To-Do List   
 03/30/2018 Imaging:  XR FOOT RT MIN 3 V Introducing [facility]! [facility] introduces [facility] patient portal. Now you can access parts of your medical record, email your doctor's office, and request medication refills online. 1. In your internet browser, go to https://[employer]. [employer] 2. Click on the First Time User? Click Here link in the Sign In box. You will see the New Member Sign Up page. 3. Enter your [facility] Access Code exactly as it appears below. You will not need to use this code after youve completed the sign-up process. If you do not sign up before the expiration date, you must request a new code. · [facility] Access Code: [email or web address] Expires: 5/14/2018 10:35 AM 
 
4. Enter the last four digits of your Social Security Number (xxxx) and Date of Birth (mm/dd/yyyy) as indicated and click Submit. You will be taken to the next sign-up page. 5. Create a [facility] ID. This will be your [facility] login ID and cannot be changed, so think of one that is secure and easy to remember. 6. Create a [facility] password. You can change your password at any time. 7. Enter your Password Reset Question and Answer. This can be used at a later time if you forget your password. 8. Enter your e-mail address. You will receive e-mail notification when new information is available in [address] 9. Click Sign Up. You can now view and download portions of your medical record. 10. Click the Download Summary menu link to download a portable copy of your medical information. If you have questions, please visit the Frequently Asked Questions section of the [facility] website. Remember, [facility] is NOT to be used for urgent needs. For medical emergencies, dial 911. Now available from your iPhone and Android! Please provide this summary of care documentation to your next provider. Your primary care clinician is listed as Prudy James. If you have any questions after today's visit, please call [phone].

## 2018-04-04 NOTE — TELEPHONE ENCOUNTER
Spoke with patient advised her I spoke with Fitz Anne with [employer] forms have been signed and faxed awaiting approval and the supplies will be delivered.

## 2018-04-06 NOTE — TELEPHONE ENCOUNTER
Spoke with Camila Caraballo with [employer] she needs us to place Loren's NPI on oxygen forms she will be faxing over.

## 2018-04-11 NOTE — MR AVS SNAPSHOT
[address] 
 
 
 [address] [facility] [address] [address] [phone] Patient: Foster Gómez MRN: [id number] Visit Information Date & Time Provider Department Dept. Phone Encounter #  
 4/11/2018 11:00 AM Aimee Larson, [address] [facility] Cardiology [facility] [phone] [id number] Follow-up Instructions Return in about 3 months (around 7/11/2018). Follow-up and Disposition History Your Appointments 4/12/2018  8:30 AM  
ESTABLISHED PATIENT with Celeste Amos MD  
[address] ([address]) Appt Note: hospital f/u [facility] [address] [address]  
[address] [facility] [address] [facility] [address] 5/30/2018  9:30 AM  
ESTABLISHED PATIENT with Jane Salas NP  
[address] ([address]) Appt Note: 2 mo F/U  
 [address] [address]  
[address] [facility] 9449 [facility] [address] 7/18/2018  1:40 PM  
ESTABLISHED PATIENT with Aimee Larson MD  
[address] [facility] [address]) Appt Note: 3 mth fu  $0 cp  
 [address] [facility] 67 [address] [phone]  
  
   
 [address] [place] [address] Upcoming Health Maintenance Date Due Hepatitis C Screening 1945 FOBT Q 1 YEAR AGE 50-75 11/13/1996 EYE EXAM RETINAL OR DILATED Q1 12/9/2014 GLAUCOMA SCREENING Q2Y 12/9/2015 DTaP/Tdap/Td series (1 - Tdap) 10/12/2017 HEMOGLOBIN A1C Q6M 3/21/2018 LIPID PANEL Q1 4/21/2018 MEDICARE YEARLY EXAM 4/22/2018 FOOT EXAM Q1 9/25/2018 MICROALBUMIN Q1 10/10/2018 BREAST CANCER SCRN MAMMOGRAM 3/21/2019 Allergies as of 4/11/2018  Review Complete On: 4/11/2018 By: Ousmane Hrerera LPN  
  
 Severity Noted Reaction Type Reactions Tramadol Medium 07/29/2013    Hives, Rash Pcn [Penicillins]  10/05/2015    Rash, Nausea and Vomiting Shellfish Containing Products  08/17/2012    Shortness of Breath Statins-hmg-coa Reductase Inhibitors  01/27/2015   Side Effect Myalgia Current Immunizations  Reviewed on 4/21/2017 Name Date Influenza High Dose Vaccine PF 9/21/2017, 9/26/2016, 11/17/2015 Influenza Vaccine 11/8/2013  1:28 PM  
 Pneumococcal Conjugate (PCV-13) 12/20/2015 Pneumococcal Polysaccharide (PPSV-23) 11/8/2013  1:30 PM  
 Td, Adsorbed 10/11/2017 Zoster Vaccine, Live 1/1/2010 Not reviewed this visit You Were Diagnosed With   
  
 Codes Comments Paroxysmal atrial fibrillation (HCC)    -  Primary ICD-10-CM: I48.0 ICD-9-CM: 427.31 Atherosclerosis of native coronary artery of native heart without angina pectoris     ICD-10-CM: I25.10 ICD-9-CM: 414.01 Essential hypertension, benign     ICD-10-CM: I10 
ICD-9-CM: 352. 1 Chronic combined systolic and diastolic heart failure (HCC)     ICD-10-CM: I50.42 
ICD-9-CM: 428.42 Chronic bronchitis, unspecified chronic bronchitis type ([address]     ICD-10-CM: B16 ICD-9-CM: 491.9 CYRIL on CPAP     ICD-10-CM: G47.33, Z99.89 ICD-9-CM: 327.23, V46.8 Uncontrolled type 2 diabetes mellitus with complication, unspecified whether long term insulin use (HCC)     ICD-10-CM: E11.8, E11.65 ICD-9-CM: 250.92 Morbid obesity with BMI of 45.0-49.9, adult (HCC)     ICD-10-CM: E66.01, Z68.42 
ICD-9-CM: 278.01, V85.42   
 CKD (chronic kidney disease) stage 3, GFR 30-59 ml/min     ICD-10-CM: N18.3 ICD-9-CM: 580. 3 Vitals BP Pulse Resp Height(growth percentile) Weight(growth percentile) SpO2  
 150/88 (BP 1 Location: Left arm, BP Patient Position: Sitting) 72 18 4' 10\" (1.473 m) 209 lb (94.8 kg) 96% BMI OB Status Smoking Status 43.68 kg/m2 Hysterectomy Former Smoker Vitals History BMI and BSA Data Body Mass Index Body Surface Area  
 43.68 kg/m 2 1.97 m 2 Preferred Pharmacy Pharmacy Name Phone [facility] [address] [place] [phone] [address] [phone] Your Updated Medication List  
  
   
This list is accurate as of 4/11/18 11:59 AM.  Always use your most recent med list.  
  
  
  
  
 ACETAMINOPHEN EXTRA STRENGTH PO Take  by mouth as needed. albuterol-ipratropium 2.5 mg-0.5 mg/3 ml Nebu Commonly known as:  DUO-NEB  
3 mL by Nebulization route every six (6) hours as needed. allopurinol 100 mg tablet Commonly known as:  Patrick Mcnamara Take 2 Tabs by mouth daily. Indications: Gout ALPRAZolam 0.5 mg tablet Commonly known as:  Julius Raman Take 1 Tab by mouth two (2) times daily as needed for Anxiety or Sleep. Max Daily Amount: 1 mg. Indications: ANXIETY  
  
 atorvastatin 20 mg tablet Commonly known as:  LIPITOR  
TAKE 1 TABLET EVERY DAY  
  
 azithromycin 250 mg tablet Commonly known as:  ZITHROMAX  
  
 bumetanide 1 mg tablet Commonly known as:  Tura Denisha Take 2 Tabs by mouth two (2) times a day. Indications: Edema CARTIA  mg ER capsule Generic drug:  dilTIAZem CD  
TAKE 1 CAPSULE EVERY DAY  
  
 cholecalciferol 1,000 unit tablet Commonly known as:  VITAMIN D3 Take 1 Tab by mouth daily. esomeprazole 20 mg capsule Commonly known as:  NexIUM Take 1 Cap by mouth daily. Indications: gastroesophageal reflux disease  
  
 famotidine 40 mg tablet Commonly known as:  PEPCID  
TAKE 1 TABLET EVERY DAY  FOR  GASTROESOPHAGEAL REFLUX  
  
 gabapentin 300 mg capsule Commonly known as:  NEURONTIN  
  
 isosorbide mononitrate ER 30 mg tablet Commonly known as:  IMDUR  
TAKE 1 TABLET EVERY DAY  
  
 metOLazone 2.5 mg tablet Commonly known as:  ZAROXOLYN  
2 tablets BID as needed for edema then maintenance dose of 2 tablets daily  Indications: PERIPHERAL EDEMA DUE TO CHRONIC HEART FAILURE  
  
 metoprolol tartrate 50 mg tablet Commonly known as:  LOPRESSOR Take 1 Tab by mouth two (2) times a day. nitroglycerin 0.4 mg SL tablet Commonly known as:  NITROSTAT  
1 Tab by SubLINGual route every five (5) minutes as needed. nystatin powder Commonly known as:  MYCOSTATIN Apply  to affected area three (3) times daily. OXYGEN-AIR DELIVERY SYSTEMS Take 2 L by inhalation continuous. predniSONE 20 mg tablet Commonly known as:  Jens Res Take 1 Tab by mouth daily (with breakfast). SPIRIVA WITH HANDIHALER 18 mcg inhalation capsule Generic drug:  tiotropium Take 1 Cap by inhalation daily. theophylline ER(12 hour) 450 mg tablet Commonly known as:  THEOCHRON  
TAKE 1 TALETB BY MOUTH DAILY. YOU NEED BLOOD WORK FOR THIS MEDICATION  
  
 triamcinolone acetonide 0.1 % ointment Commonly known as:  KENALOG Apply  to affected area two (2) times a day. use thin layer VENTOLIN HFA 90 mcg/actuation inhaler Generic drug:  albuterol  
  
 warfarin 5 mg tablet Commonly known as:  COUMADIN Take 5 mg by mouth daily. We Performed the Following AMB POC EKG ROUTINE W/ 12 LEADS, INTER & REP [39763 CPT(R)] Follow-up Instructions Return in about 3 months (around 7/11/2018). Introducing \[facility]\"" & HEALTH SERVICES! Raul De Paz introduces [employer] patient portal. Now you can access parts of your medical record, email your doctor's office, and request medication refills online. 1. In your internet browser, go to [email or web address] 2. Click on the First Time User? Click Here link in the Sign In box. You will see the New Member Sign Up page. 3. Enter your [employer] Access Code exactly as it appears below. You will not need to use this code after youve completed the sign-up process. If you do not sign up before the expiration date, you must request a new code.  
 
· [employer] Access Code: [email or web address] 
 Expires: 5/14/2018 10:35 AM 
 
4. Enter the last four digits of your Social Security Number (xxxx) and Date of Birth (mm/dd/yyyy) as indicated and click Submit. You will be taken to the next sign-up page. 5. Create a [employer]fresh ID. This will be your [employer]fresh login ID and cannot be changed, so think of one that is secure and easy to remember. 6. Create a Purfresh password. You can change your password at any time. 7. Enter your Password Reset Question and Answer. This can be used at a later time if you forget your password. 8. Enter your e-mail address. You will receive e-mail notification when new information is available in [address] 9. Click Sign Up. You can now view and download portions of your medical record. 10. Click the Download Summary menu link to download a portable copy of your medical information. If you have questions, please visit the Frequently Asked Questions section of the [employer] website. Remember, [employer] is NOT to be used for urgent needs. For medical emergencies, dial 911. Now available from your iPhone and Android! Please provide this summary of care documentation to your next provider. Your primary care clinician is listed as Dung Carpenter. If you have any questions after today's visit, please call [phone].

## 2018-04-11 NOTE — PROGRESS NOTES
Zeus Shane is a 71 y.o. female is here for hospital f/u--at [address] last week with weakness, URI, diarrhea, low grade fever, COPD, afib/emmy/tachy, mild dehydration acute/chronic renal. .  Meds adjusted, doing ok since d/c back to stable baseline. . Did not bring med list in with her today. Also seen by Dr. Batool Varela (emmy-tach at times), rec d/c theophylline. Remains dyspneic, weak but slowly improving. Details of hospitalizaton not available. Planning to see PCP tomorrow. The patient denies chest pain, orthopnea, PND, palpitations, syncope, presyncope.        Patient Active Problem List    Diagnosis Date Noted    Pain in both feet 09/21/2017    Chronic gout of multiple sites 04/21/2017    CKD (chronic kidney disease) stage 3, GFR 30-59 ml/min 04/21/2017    Dry skin dermatitis 07/22/2016    CYRIL on CPAP 05/03/2016    Pruritic dermatitis 04/15/2016    Bronchitis 10/01/2015    Long term current use of anticoagulant 08/25/2015    Paroxysmal atrial fibrillation ([address] 06/22/2015    Heart failure ([facility]     Coronary atherosclerosis of native coronary artery     Morbid obesity with BMI of 45.0-49.9, adult ([facility] 01/23/2015    GERD (gastroesophageal reflux disease)     Essential hypertension, benign     Hyperlipidemia     Gout     Osteoporosis     Anemia     COPD (chronic obstructive pulmonary disease) ([address] 08/18/2012      Hiram Ambriz NP  Past Medical History:   Diagnosis Date    Adjustment disorder with depressed mood 2009    Allergic rhinitis due to other allergen 2011    Anemia, unspecified 2009    COPD     Coronary atherosclerosis of native coronary artery     Essential hypertension, benign 2008    GERD (gastroesophageal reflux disease)     Gout, unspecified 2008    Heart failure ([address]     hx of, asymptomic now    Impaired glucose tolerance test 2012    Morbid obesity with BMI of 45.0-49.9, adult ([facility] 1/23/2015    Osteoporosis, unspecified 2009    Other and unspecified hyperlipidemia 2008    Unspecified hereditary and idiopathic peripheral neuropathy 2010    Unspecified vitamin D deficiency 2009      Past Surgical History:   Procedure Laterality Date    HX APPENDECTOMY      HX COLONOSCOPY      HX CORONARY STENT PLACEMENT  2000    LCx    HX HEART CATHETERIZATION  2000    HX HEART CATHETERIZATION  2012    LVEWDP 11, PCWP 11, Inf Hypo EF 45%, LAD 30%, mRCA 100% R to R and L to R collaterals    HX PTCA  2012    Unsuccessful attempt to opne RCA    HX ROBERT AND BSO       Allergies   Allergen Reactions    Tramadol Hives and Rash    Pcn [Penicillins] Rash and Nausea and Vomiting    Shellfish Containing Products Shortness of Breath    Statins-Hmg-Coa Reductase Inhibitors Myalgia      Family History   Problem Relation Age of Onset    Arthritis-osteo Mother     Diabetes Mother     Heart Disease Mother     Lung Disease Mother     Hypertension Sister     Asthma Sister     Asthma Brother     Heart Disease Maternal Grandmother     No Known Problems Brother     No Known Problems Sister     Kidney Disease Daughter     Lupus Daughter       Social History     Social History    Marital status:      Spouse name: N/A    Number of children: N/A    Years of education: N/A     Occupational History    Not on file.      Social History Main Topics    Smoking status: Former Smoker     Packs/day: 1.00     Years: 40.00     Quit date: 12/1/2013    Smokeless tobacco: Never Used    Alcohol use No    Drug use: No    Sexual activity: Not on file     Other Topics Concern     Service No    Blood Transfusions Yes    Caffeine Concern No    Occupational Exposure No    Hobby Hazards No    Sleep Concern Yes    Stress Concern No    Weight Concern Yes    Special Diet No    Back Care Yes    Exercise No    Bike Helmet Yes    Seat Belt Yes    Self-Exams Yes     Social History Narrative      Current Outpatient Prescriptions   Medication Sig    azithromycin (ZITHROMAX) 250 mg tablet     VENTOLIN HFA 90 mcg/actuation inhaler     tiotropium (SPIRIVA WITH HANDIHALER) 18 mcg inhalation capsule Take 1 Cap by inhalation daily.  warfarin (COUMADIN) 5 mg tablet Take 5 mg by mouth daily.  ACETAMINOPHEN EXTRA STRENGTH PO Take  by mouth as needed.  OXYGEN-AIR DELIVERY SYSTEMS Take 2 L by inhalation continuous.  esomeprazole (NEXIUM) 20 mg capsule Take 1 Cap by mouth daily. Indications: gastroesophageal reflux disease    atorvastatin (LIPITOR) 20 mg tablet TAKE 1 TABLET EVERY DAY    predniSONE (DELTASONE) 20 mg tablet Take 1 Tab by mouth daily (with breakfast).  nitroglycerin (NITROSTAT) 0.4 mg SL tablet 1 Tab by SubLINGual route every five (5) minutes as needed.  famotidine (PEPCID) 40 mg tablet TAKE 1 TABLET EVERY DAY  FOR  GASTROESOPHAGEAL REFLUX    metoprolol tartrate (LOPRESSOR) 50 mg tablet Take 1 Tab by mouth two (2) times a day.  allopurinol (ZYLOPRIM) 100 mg tablet Take 2 Tabs by mouth daily. Indications: Gout    CARTIA  mg ER capsule TAKE 1 CAPSULE EVERY DAY    gabapentin (NEURONTIN) 300 mg capsule     bumetanide (BUMEX) 1 mg tablet Take 2 Tabs by mouth two (2) times a day. Indications: Edema    metOLazone (ZAROXOLYN) 2.5 mg tablet 2 tablets BID as needed for edema then maintenance dose of 2 tablets daily  Indications: PERIPHERAL EDEMA DUE TO CHRONIC HEART FAILURE    isosorbide mononitrate ER (IMDUR) 30 mg tablet TAKE 1 TABLET EVERY DAY    theophylline ER,12 hour, (THEOCHRON) 450 mg tablet TAKE 1 TALETB BY MOUTH DAILY. YOU NEED BLOOD WORK FOR THIS MEDICATION    triamcinolone acetonide (KENALOG) 0.1 % ointment Apply  to affected area two (2) times a day. use thin layer    ALPRAZolam (XANAX) 0.5 mg tablet Take 1 Tab by mouth two (2) times daily as needed for Anxiety or Sleep. Max Daily Amount: 1 mg. Indications: ANXIETY    nystatin (MYCOSTATIN) powder Apply  to affected area three (3) times daily.     albuterol-ipratropium (DUO-NEB) 2.5 mg-0.5 mg/3 ml nebu 3 mL by Nebulization route every six (6) hours as needed.  cholecalciferol (VITAMIN D3) 1,000 unit tablet Take 1 Tab by mouth daily. (Patient taking differently: Take 2,000 Units by mouth daily. Indications: VITAMIN D DEFICIENCY)     No current facility-administered medications for this visit. Review of Symptoms:    CONST  No weight change. No fever, chills, sweats    ENT No visual changes, URI sx, sore throat    CV  See HPI   RESP  No cough, or sputum, wheezing. Also see HPI   GI  No abdominal pain or change in bowel habits. No heartburn or dysphagia. No melena or rectal bleeding.   No dysuria, urgency, frequency, hematuria   MSKEL  No joint pain, swelling. No muscle pain. SKIN  No rash or lesions. NEURO  No headache, syncope, or seizure. No weakness, loss of sensation, or paresthesias. PSYCH  No low mood or depression  No anxiety. HE/LYMPH  No easy bruising, abnormal bleeding, or enlarged glands.         Physical ExamPhysical Exam:    Visit Vitals    /84 (BP 1 Location: Left arm, BP Patient Position: Sitting)    Pulse 72    Resp 18    Ht 4' 10\" (1.473 m)    Wt 209 lb (94.8 kg)    SpO2 96%  Comment: 2 L    BMI 43.68 kg/m2     Gen: NAD obese wearing nasal O2  HEENT:  PERRL, throat clear  Neck: no adenopathy, no thyromegaly, no JVD   Heart:  Regular,Nl S1S2,  I/Vi murmur, no gallop or rub.   Lungs:  clear  Abdomen:   Soft, non-tender, bowel sounds are active.   Extremities:  mild edema  Pulse: symmetric  Neuro: A&O times 3, No focal neuro deficits    Cardiographics    ECG: NSR 67, no acute changes    Labs:   Lab Results   Component Value Date/Time    Sodium 142 10/27/2017 09:46 AM    Sodium 146 (H) 10/10/2017 04:17 PM    Sodium 140 09/21/2017 11:15 AM    Sodium 142 06/23/2017 11:59 AM    Sodium 140 04/21/2017 11:21 AM    Potassium 3.7 10/27/2017 09:46 AM    Potassium 4.4 10/10/2017 04:17 PM    Potassium 4.8 09/21/2017 11:15 AM    Potassium 5.1 06/23/2017 11:59 AM    Potassium 4.8 04/21/2017 11:21 AM    Chloride 93 (L) 10/27/2017 09:46 AM    Chloride 95 (L) 10/10/2017 04:17 PM    Chloride 92 (L) 09/21/2017 11:15 AM    Chloride 96 06/23/2017 11:59 AM    Chloride 96 04/21/2017 11:21 AM    CO2 29 10/27/2017 09:46 AM    CO2 28 10/10/2017 04:17 PM    CO2 31 (H) 09/21/2017 11:15 AM    CO2 27 06/23/2017 11:59 AM    CO2 25 04/21/2017 11:21 AM    Anion gap 7 08/18/2012 04:00 AM    Glucose 205 (H) 10/27/2017 09:46 AM    Glucose 120 (H) 10/10/2017 04:17 PM    Glucose 152 (H) 09/21/2017 11:15 AM    Glucose 104 (H) 06/23/2017 11:59 AM    Glucose 152 (H) 04/21/2017 11:21 AM    BUN 96 (HH) 10/27/2017 09:46 AM    BUN 82 (HH) 10/10/2017 04:17 PM    BUN 81 (HH) 09/21/2017 11:15 AM    BUN 34 (H) 06/23/2017 11:59 AM    BUN 59 (H) 04/21/2017 11:21 AM    Creatinine 2.48 (H) 10/27/2017 09:46 AM    Creatinine 1.75 (H) 10/10/2017 04:17 PM    Creatinine 2.01 (H) 09/21/2017 11:15 AM    Creatinine 1.37 (H) 06/23/2017 11:59 AM    Creatinine 1.54 (H) 04/21/2017 11:21 AM    BUN/Creatinine ratio 39 (H) 10/27/2017 09:46 AM    BUN/Creatinine ratio 47 (H) 10/10/2017 04:17 PM    BUN/Creatinine ratio 40 (H) 09/21/2017 11:15 AM    BUN/Creatinine ratio 25 06/23/2017 11:59 AM    BUN/Creatinine ratio 38 (H) 04/21/2017 11:21 AM    GFR est AA 22 (L) 10/27/2017 09:46 AM    GFR est AA 34 (L) 10/10/2017 04:17 PM    GFR est AA 28 (L) 09/21/2017 11:15 AM    GFR est AA 45 (L) 06/23/2017 11:59 AM    GFR est AA 39 (L) 04/21/2017 11:21 AM    GFR est non-AA 19 (L) 10/27/2017 09:46 AM    GFR est non-AA 29 (L) 10/10/2017 04:17 PM    GFR est non-AA 25 (L) 09/21/2017 11:15 AM    GFR est non-AA 39 (L) 06/23/2017 11:59 AM    GFR est non-AA 34 (L) 04/21/2017 11:21 AM    Calcium 9.8 10/27/2017 09:46 AM    Calcium 10.6 (H) 10/10/2017 04:17 PM    Calcium 9.7 09/21/2017 11:15 AM    Calcium 10.1 06/23/2017 11:59 AM    Calcium 9.8 04/21/2017 11:21 AM    Bilirubin, total 0.3 10/10/2017 04:17 PM    Bilirubin, total 0.3 09/21/2017 11:15 AM    Bilirubin, total 0.4 04/21/2017 11:21 AM    Bilirubin, total 0.2 02/24/2017 10:00 AM    Bilirubin, total <0.2 08/02/2016 01:50 PM    AST (SGOT) 15 10/10/2017 04:17 PM    AST (SGOT) 8 09/21/2017 11:15 AM    AST (SGOT) 11 04/21/2017 11:21 AM    AST (SGOT) 9 02/24/2017 10:00 AM    AST (SGOT) 10 08/02/2016 01:50 PM    Alk. phosphatase 120 (H) 10/10/2017 04:17 PM    Alk. phosphatase 122 (H) 09/21/2017 11:15 AM    Alk. phosphatase 131 (H) 04/21/2017 11:21 AM    Alk. phosphatase 121 (H) 02/24/2017 10:00 AM    Alk.  phosphatase 117 08/02/2016 01:50 PM    Protein, total 7.3 10/10/2017 04:17 PM    Protein, total 6.8 09/21/2017 11:15 AM    Protein, total 6.9 04/21/2017 11:21 AM    Protein, total 6.8 02/24/2017 10:00 AM    Protein, total 6.3 08/02/2016 01:50 PM    Albumin 4.3 10/10/2017 04:17 PM    Albumin 4.2 09/21/2017 11:15 AM    Albumin 4.2 04/21/2017 11:21 AM    Albumin 4.1 02/24/2017 10:00 AM    Albumin 4.2 09/13/2016 02:35 PM    A-G Ratio 1.4 10/10/2017 04:17 PM    A-G Ratio 1.6 09/21/2017 11:15 AM    A-G Ratio 1.6 04/21/2017 11:21 AM    A-G Ratio 1.5 02/24/2017 10:00 AM    A-G Ratio 1.6 08/02/2016 01:50 PM    ALT (SGPT) 11 10/10/2017 04:17 PM    ALT (SGPT) 9 09/21/2017 11:15 AM    ALT (SGPT) 10 04/21/2017 11:21 AM    ALT (SGPT) 11 02/24/2017 10:00 AM    ALT (SGPT) 13 08/02/2016 01:50 PM     No results found for: CPK, CPKX, CPX  Lab Results   Component Value Date/Time    Cholesterol, total 276 (H) 04/21/2017 11:21 AM    Cholesterol, total 236 (H) 02/24/2017 10:00 AM    Cholesterol, total 195 07/22/2016 09:42 AM    Cholesterol, total 220 (H) 12/16/2015 10:17 AM    Cholesterol, total 227 (H) 06/15/2015 10:51 AM    HDL Cholesterol 65 04/21/2017 11:21 AM    HDL Cholesterol 56 02/24/2017 10:00 AM    HDL Cholesterol 51 07/22/2016 09:42 AM    HDL Cholesterol 59 12/16/2015 10:17 AM    HDL Cholesterol 58 06/15/2015 10:51 AM    LDL, calculated 139 (H) 04/21/2017 11:21 AM LDL, calculated 126 (H) 02/24/2017 10:00 AM    LDL, calculated 88 07/22/2016 09:42 AM    LDL, calculated 111 (H) 12/16/2015 10:17 AM    LDL, calculated 120 (H) 06/15/2015 10:51 AM    Triglyceride 358 (H) 04/21/2017 11:21 AM    Triglyceride 272 (H) 02/24/2017 10:00 AM    Triglyceride 280 (H) 07/22/2016 09:42 AM    Triglyceride 249 (H) 12/16/2015 10:17 AM    Triglyceride 243 (H) 06/15/2015 10:51 AM     No results found for this or any previous visit. Assessment:         Patient Active Problem List    Diagnosis Date Noted    Pain in both feet 09/21/2017    Chronic gout of multiple sites 04/21/2017    CKD (chronic kidney disease) stage 3, GFR 30-59 ml/min 04/21/2017    Dry skin dermatitis 07/22/2016    CYRIL on CPAP 05/03/2016    Pruritic dermatitis 04/15/2016    Bronchitis 10/01/2015    Long term current use of anticoagulant 08/25/2015    Paroxysmal atrial fibrillation ([address] 06/22/2015    Heart failure ([facility]     Coronary atherosclerosis of native coronary artery     Morbid obesity with BMI of 45.0-49.9, adult ([address] 01/23/2015    GERD (gastroesophageal reflux disease)     Essential hypertension, benign     Hyperlipidemia     Gout     Osteoporosis     Anemia     COPD (chronic obstructive pulmonary disease) ([address] 08/18/2012     Here for hospital f/u--at [facility] last week with weakness, URI, diarrhea, low grade fever, COPD, afib/emmy/tachy, mild dehydration acute/chronic renal. .  Meds adjusted, doing ok since d/c back to stable baseline. . Did not bring med list in with her today. Also seen by Dr. Ruben Harden (emmy-tach at times), rec d/c theophylline. Remains dyspneic, weak but slowly improving. Details of hospitalizaton not available. Planning to see PCP tomorrow. BP elevated currently     Plan: Will call us back with current med list  To F/u with PCP tomorrow as planned--if BP remains elevated may need adjusted rx   Continue COPD meds/home O2.    Continue current care and f/u in 3 months, sooner prn.     Marilyn Mccullough MD

## 2018-04-11 NOTE — PROGRESS NOTES
Verified patient with two patient identifiers. Medications reviewed/approved by Dr. Stephanie Moreno. Verbal from Dr. Stephanie Moreno to remove the medications that were deleted during the visit. Chief Complaint   Patient presents with    Irregular Heart Beat     7 month follow up    Coronary Artery Disease    Hypertension     1. Have you been to the ER, urgent care clinic since your last visit? Hospitalized since your last visit? Yes, [facility] EM[facility]GENCY [facility] (weakness) last week. 2. Have you seen or consulted any other health care providers outside of the [address] [place] [address] since your last visit? Include any pap smears or colon screening.  no

## 2018-04-12 PROBLEM — M79.672 PAIN IN BOTH FEET: Status: RESOLVED | Noted: 2017-09-21 | Resolved: 2018-01-01

## 2018-04-12 PROBLEM — M79.671 PAIN IN BOTH FEET: Status: RESOLVED | Noted: 2017-09-21 | Resolved: 2018-01-01

## 2018-04-12 NOTE — TELEPHONE ENCOUNTER
Pt states she did not bring her meds to visit yesterday.     Current meds are:    Albuterol 100 - inhaler - prn    Atorvastatin 20 - once daily    Azithromycin 250 - four times daily    Bumetanide 1 mg - once daily    Dextromethorphian (Mucinex) - one daily    Esomeprazole 20 (Nexium) - once daily    Pepcid 40 - once daily    Isosorbide Mononitrate 30 - once daily    Metolazone 2.5 - twice daily    Metoprolol 50 - twice daily    Warfarin 5 mg - (New dosing as of today 4/12/18)                               2.5 mg Thurs, Sun; 5 mg all other days

## 2018-04-12 NOTE — MR AVS SNAPSHOT
[facility], [place], operated by [facility] 
 
 
 [address] [email or web address] [address] 
[phone] Patient: Genny Tripp MRN: [id number] PCT:[id number] Visit Information Date & Time Provider Department Dept. Phone Encounter #  
 4/12/2018  8:30 AM Kimber Nieto, [address] [place] [phone] [id number] Follow-up Instructions Return in about 4 weeks (around 5/10/2018). Routing History Follow-up and Disposition History Your Appointments 5/30/2018  9:30 AM  
ESTABLISHED PATIENT with Fransisco Moser NP  
[address] [place] ([facility]) Appt Note: 2 mo F/U  
 [address] [address]  
[address] [facility] [address] [facility] [address] 7/18/2018  1:40 PM  
ESTABLISHED PATIENT with Marilyn Mccullough MD  
[address] [place] [facility]) Appt Note: 3 mth fu  $0 cp  
 [address] [facility] [address] [address] [phone]  
  
   
 [address] [place] [address] Upcoming Health Maintenance Date Due Hepatitis C Screening 1945 FOBT Q 1 YEAR AGE 50-75 11/13/1996 EYE EXAM RETINAL OR DILATED Q1 12/9/2014 GLAUCOMA SCREENING Q2Y 12/9/2015 DTaP/Tdap/Td series (1 - Tdap) 10/12/2017 HEMOGLOBIN A1C Q6M 3/21/2018 LIPID PANEL Q1 4/21/2018 MEDICARE YEARLY EXAM 4/22/2018 FOOT EXAM Q1 9/25/2018 MICROALBUMIN Q1 10/10/2018 BREAST CANCER SCRN MAMMOGRAM 3/21/2019 Allergies as of 4/12/2018  Review Complete On: 4/12/2018 By: Kimber Nieto MD  
  
 Severity Noted Reaction Type Reactions Tramadol Medium 07/29/2013    Hives, Rash Pcn [Penicillins]  10/05/2015    Rash, Nausea and Vomiting Shellfish Containing Products  08/17/2012    Shortness of Breath Statins-hmg-coa Reductase Inhibitors  01/27/2015   Side Effect Myalgia Current Immunizations  Reviewed on 4/21/2017 Name Date Influenza High Dose Vaccine PF 9/21/2017, 9/26/2016, 11/17/2015 Influenza Vaccine 11/8/2013  1:28 PM  
 Pneumococcal Conjugate (PCV-13) 12/20/2015 Pneumococcal Polysaccharide (PPSV-23) 11/8/2013  1:30 PM  
 Td, Adsorbed 10/11/2017 Zoster Vaccine, Live 1/1/2010 Not reviewed this visit You Were Diagnosed With   
  
 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   
 CKD (chronic kidney disease) stage 3, GFR 30-59 ml/min     ICD-10-CM: N18.3 ICD-9-CM: 225. 3 Atherosclerosis     ICD-10-CM: I70.90 ICD-9-CM: 440.9 Vitals BP Pulse Temp Resp Height(growth percentile) Weight(growth percentile) 130/68 (BP 1 Location: Right arm, BP Patient Position: Sitting) 69 97.8 °F (36.6 °C) (Oral) 28 4' 10\" (1.473 m) 208 lb (94.3 kg) SpO2 BMI OB Status Smoking Status 93% 43.47 kg/m2 Hysterectomy Former Smoker BMI and BSA Data Body Mass Index Body Surface Area  
 43.47 kg/m 2 1.96 m 2 Preferred Pharmacy Pharmacy Name Phone [place] [address], [place] - [address] [facility] [address] [place] [address] [phone] Your Updated Medication List  
  
   
This list is accurate as of 4/12/18  9:44 AM.  Always use your most recent med list.  
  
  
  
  
 ACETAMINOPHEN EXTRA STRENGTH PO Take  by mouth as needed. allopurinol 300 mg tablet Commonly known as:  Angie Zurita Take 1 Tab by mouth daily. Indications: Gout ALPRAZolam 0.5 mg tablet Commonly known as:  Peola Amna Take 1 Tab by mouth two (2) times daily as needed for Anxiety or Sleep. Max Daily Amount: 1 mg. Indications: ANXIETY  
  
 atorvastatin 20 mg tablet Commonly known as:  LIPITOR  
TAKE 1 TABLET EVERY DAY  
  
 bumetanide 1 mg tablet Commonly known as:  [place] Take 2 Tabs by mouth two (2) times a day. Indications: Edema  
  
 cholecalciferol 1,000 unit tablet Commonly known as:  VITAMIN D3 Take 2 Tabs by mouth daily. Indications: Vitamin D Deficiency  
  
 famotidine 40 mg tablet Commonly known as:  PEPCID  
TAKE 1 TABLET EVERY DAY  FOR  GASTROESOPHAGEAL REFLUX  
  
 gabapentin 300 mg capsule Commonly known as:  NEURONTIN  
  
 guaiFENesin-dextromethorphan -30 mg per tablet Commonly known as:  HUMIBID DM Take 1 Tab by mouth two (2) times a day. isosorbide mononitrate ER 30 mg tablet Commonly known as:  IMDUR  
TAKE 1 TABLET EVERY DAY  
  
 metOLazone 2.5 mg tablet Commonly known as:  ZAROXOLYN  
2 tablets BID as needed for edema then maintenance dose of 2 tablets daily  Indications: PERIPHERAL EDEMA DUE TO CHRONIC HEART FAILURE  
  
 metoprolol succinate 100 mg tablet Commonly known as:  TOPROL-XL Take 1 Tab by mouth daily. Indications: heart, replaces plain metoprolol  
  
 nitroglycerin 0.4 mg SL tablet Commonly known as:  NITROSTAT  
1 Tab by SubLINGual route every five (5) minutes as needed. nystatin powder Commonly known as:  MYCOSTATIN Apply  to affected area three (3) times daily. OXYGEN-AIR DELIVERY SYSTEMS Take 2 L by inhalation continuous. tiotropium bromide 2.5 mcg/actuation inhaler Commonly known as:  Claritza Winston Take 2 Puffs by inhalation daily. triamcinolone acetonide 0.1 % ointment Commonly known as:  KENALOG Apply  to affected area two (2) times a day. use thin layer VENTOLIN HFA 90 mcg/actuation inhaler Generic drug:  albuterol  
  
 warfarin 5 mg tablet Commonly known as:  COUMADIN Take 5 mg by mouth daily. Indications: x 5 days Prescriptions Sent to Pharmacy Refills  
 allopurinol (ZYLOPRIM) 300 mg tablet 3 Sig: Take 1 Tab by mouth daily. Indications: Gout Class: Normal  
 Pharmacy: [address] [place] [address] Ph #: [phone] Route: Oral  
 tiotropium bromide (SPIRIVA RESPIMAT) 2.5 mcg/actuation inhaler 3 Sig: Take 2 Puffs by inhalation daily. Class: Normal  
 Pharmacy: [address] [place] [address] Ph #: [phone] Route: Inhalation  
 metoprolol succinate (TOPROL-XL) 100 mg tablet 3 Sig: Take 1 Tab by mouth daily. Indications: heart, replaces plain metoprolol Class: Normal  
 Pharmacy: [address] [place] [address] Ph #: [phone] Route: Oral  
  
We Performed the Following AMB POC PT/INR [34762 CPT(R)] AMB POC URINALYSIS DIP STICK AUTO W/ MICRO  [17487 CPT(R)] COLLECTION CAPILLARY BLOOD SPECIMEN [31665 CPT(R)] HEMOGLOBIN A1C WITH EAG [19764 CPT(R)] LIPID PANEL [19687 CPT(R)] Follow-up Instructions Return in about 4 weeks (around 5/10/2018). Patient Instructions Boost warfarin 5mg to half pill Mon And Thurs, whole pill all other days. INR was low at 1.2 today. Introducing [facility]! Remberto Kolb introduces [employer] patient portal. Now you can access parts of your medical record, email your doctor's office, and request medication refills online. 1. In your internet browser, go to [email or web address] 2. Click on the First Time User? Click Here link in the Sign In box.  You will see the New Member Sign Up page. 3. Enter your [employer] Access Code exactly as it appears below. You will not need to use this code after youve completed the sign-up process. If you do not sign up before the expiration date, you must request a new code. · [employer] Access Code: [email or web address] Expires: 5/14/2018 10:35 AM 
 
4. Enter the last four digits of your Social Security Number (xxxx) and Date of Birth (mm/dd/yyyy) as indicated and click Submit. You will be taken to the next sign-up page. 5. Create a [employer] ID. This will be your [employer] login ID and cannot be changed, so think of one that is secure and easy to remember. 6. Create a [employer] password. You can change your password at any time. 7. Enter your Password Reset Question and Answer. This can be used at a later time if you forget your password. 8. Enter your e-mail address. You will receive e-mail notification when new information is available in [address] 9. Click Sign Up. You can now view and download portions of your medical record. 10. Click the Download Summary menu link to download a portable copy of your medical information. If you have questions, please visit the Frequently Asked Questions section of the [employer] website. Remember, [employer] is NOT to be used for urgent needs. For medical emergencies, dial 911. Now available from your iPhone and Android! Please provide this summary of care documentation to your next provider. Your primary care clinician is listed as Adra Ora. If you have any questions after today's visit, please call [phone].

## 2018-04-12 NOTE — Clinical Note
Saw pt today. Doing ok, did some tune up, switched out plain metoprolol for toprol XL. What do you think about switching out one of her metolazone 2.5s for an aldactone 12.5 for better CHF and BP control?

## 2018-04-12 NOTE — PROGRESS NOTES
Joleen Linton is a 71 y.o. female presenting for/with:    Hospital Follow Up (4/5-4/7)    HPI:  Pt hospitalized at [facility] earlier this mo  Was volume depleted due to GI illness. Hydrated up, but had persistent tachycardia, with occ bradycardia episodes. Taken off theyphylline and diltiazem, and sx resolved. Haven't come back since coming home. Hypertension with diastolic dysfunction (HTN HD), Ao stenosis(mild) and CKD3. Blood pressures have been stable. Management at last visit included continuing current regimen . Current regimen: beta-blocker, loop diuretic and thiazide diuretic. Symptoms include dyspnea, peripheral edema. Patient denies chest pain, palpitations. Lab review:     Lab Results   Component Value Date/Time    Sodium 142 10/27/2017 09:46 AM    Potassium 3.7 10/27/2017 09:46 AM    Chloride 93 (L) 10/27/2017 09:46 AM    CO2 29 10/27/2017 09:46 AM    Anion gap 7 08/18/2012 04:00 AM    Glucose 205 (H) 10/27/2017 09:46 AM    BUN 96 (HH) 10/27/2017 09:46 AM    Creatinine 2.48 (H) 10/27/2017 09:46 AM    BUN/Creatinine ratio 39 (H) 10/27/2017 09:46 AM    GFR est AA 22 (L) 10/27/2017 09:46 AM    GFR est non-AA 19 (L) 10/27/2017 09:46 AM    Calcium 9.8 10/27/2017 09:46 AM     COPD  Current control: Fair   Current level: severe persistent  Current symptoms: cough wheezing decreased exercise tolerance dyspnea  Current controller: spiriva  Last flareup: Jan 2018  Number of flareups in past year:3  Current symptom relief med: Ventolin    Atrial fibrillation. Current symptoms: none  Current control agent: B-blocker  Current anticoagulant: warfarin 5mg x5d/wk, off x2d  History of bleeding problems: none    Results for orders placed or performed in visit on 04/12/18   AMB POC PT/INR   Result Value Ref Range    VALID INTERNAL CONTROL POC Yes     Prothrombin time (POC) 14.8 seconds    INR POC 1.2      Gout  Current control poor, Foot pain has con't to be bothersome since last visit.   Current symptoms swelling, tenderness and aching. Typical location right forefoot  Last flareup: 2/2018. Number of flareups in past year:3  Current treatment: allopurinol (Zyloprim) 200mg daily, has been on current dose for past 3mo    Lab Results   Component Value Date/Time    Uric acid 16.5 (HH) 10/27/2017 09:46 AM       Visit Vitals    /68 (BP 1 Location: Right arm, BP Patient Position: Sitting)    Pulse 69    Temp 97.8 °F (36.6 °C) (Oral)    Resp 28    Ht 4' 10\" (1.473 m)    Wt 208 lb (94.3 kg)    SpO2 93%    BMI 43.47 kg/m2     Wt Readings from Last 3 Encounters:   04/12/18 208 lb (94.3 kg)   04/11/18 209 lb (94.8 kg)   03/30/18 210 lb 3.2 oz (95.3 kg)   -1#    Physical Examination: General appearance - alert, well appearing, overweight cauc F in no distress  Mental status - alert, oriented to person, place, and time  Eyes - pupils equal and reactive, extraocular eye movements intact  ENT - bilateral external ears and nose normal. Normal lips  Neck - supple, no significant adenopathy, no thyromegaly or mass  Lymphatics - no palpable lymphadenopathy, no hepatosplenomegaly  Chest - clear to auscultation, no wheezes, rales or rhonchi, symmetric air entry  Heart - normal rate, regular rhythm, normal S1, S2, no murmurs, rubs, clicks or gallops  Extremities - peripheral pulses normal, no pedal edema, no clubbing or cyanosis    A/P:  Afib. Not in goal.  Coagulation adjustment: Boost wafarin to 5mg x5d and 2.5mg x2d, send report for home check 1 week. HTN with HTN HD  BP's good today, but not on optimal regimen for CHF pt. Will discuss switching out one of her metolazone 2.5s for an aldactone 12.5 for better CHF and BP control with Yaneth. Will go ahead and change to toprol XL for better cardiac coverage now. COPD  Off thephylline, but never got spiriva. Ore spiriva respimat. con't ventolin PRN. Gout  Not in goal. Boost to 300mg allopurinol daily.  Plan recheck in next couple of months. Hyperglycemia  Possibly an uncontrolled diabetic. Sugars quite high at [facility], in the 160's. Check A1c. Lab Results   Component Value Date/Time    Glucose 205 (H) 10/27/2017 09:46 AM     Lab Results   Component Value Date/Time    Hemoglobin A1c 6.7 (H) 09/21/2017 11:15 AM    Hemoglobin A1c, External 5.8 07/28/2016     F/U 1-2mo, depending on when seeing Cait Chilel. CC note to [employer].

## 2018-04-12 NOTE — PROGRESS NOTES
1. Have you been to the ER, urgent care clinic since your last visit? Hospitalized since your last visit? Yes When: [facility]- 4/5/18-4/7-18    2. Have you seen or consulted any other health care providers outside of the [facility] since your last visit? Include any pap smears or colon screening.  No

## 2018-04-17 NOTE — PROGRESS NOTES
Per fax request - (2 pt identifiers) - that pt is \"transferring care\" to Dr Martínez  - last ofc note, ekg and echo faxed.        [phone]  Fax  [phone]

## 2018-04-24 NOTE — TELEPHONE ENCOUNTER
Patient is at this provider's office and they are requesting most recent OVs and labs.   Fax: [phone]

## 2018-05-04 NOTE — TELEPHONE ENCOUNTER
Follow up for anticoagulation.   Indication: Atrial fibrillation  Current medication:  warfarin5 mg daily  Current symptoms: none  Current control agent: B-blocker  History of bleeding problems: No.

## 2018-05-07 NOTE — TELEPHONE ENCOUNTER
Her home INR on May 3 was 1.4. She is taking 5 mg 5 days a week and no warfarin on the other 2 days,    Advised to take 5 mg daily for 6 days and skip one day a week. Check INR weekly. Patient voices understanding.       Juanpablo Spann

## 2018-05-08 NOTE — TELEPHONE ENCOUNTER
Patient called yesterday to let us know she is very upset with Dr Virginia Kay telling her she is going to die in a year. I assured the patient that I would get in touch with my supervisor and someone would give her a call back. Spoke with patient this morning and gave her Luliros Rockwellin cell phone number per Eleanor's request. Patient agreed to give Jenna Carrasquillo a call regarding her discontent with her visit with Dr Virginia Kay.

## 2018-05-10 NOTE — TELEPHONE ENCOUNTER
Dr Jenkins Current,  This is the patient that we discussed and  just changed her warfarin dose on Monday. She takes 5 mg 6 days a week and non on the 7th day.

## 2018-05-30 NOTE — MR AVS SNAPSHOT
[address] [facility] 
 
 
 [address] [employer] [address] 
[phone] Patient: Finesse Ross MRN: [id number] TXN:[id number] Visit Information Date & Time Provider Department Dept. Phone Encounter #  
 5/30/2018  9:30 AM Surjit Gilman NP [facility] [address] [facility] [phone] [id number] Your Appointments 7/18/2018  1:40 PM  
ESTABLISHED PATIENT with Halie Blackmon MD  
[address] [place] [facility]) Appt Note: 3 mth fu  $0 cp  
 [address] [facility] [address] [address] [phone]  
  
   
 [address] [address]  
  
    
 9/20/2018  9:30 AM  
ESTABLISHED PATIENT with Surjit Gilman NP  
[address] [place] ([facility]) Appt Note: 4 mo F/U  
 [address] [facility] [address]  
[address] [facility] [address] [facility] [address] Upcoming Health Maintenance Date Due Hepatitis C Screening 1945 EYE EXAM RETINAL OR DILATED Q1 12/9/2014 GLAUCOMA SCREENING Q2Y 12/9/2015 DTaP/Tdap/Td series (1 - Tdap) 10/12/2017 FOBT Q 1 YEAR AGE 50-75 5/30/2019* Influenza Age 5 to Adult 8/1/2018 FOOT EXAM Q1 9/25/2018 MICROALBUMIN Q1 10/10/2018 HEMOGLOBIN A1C Q6M 10/12/2018 BREAST CANCER SCRN MAMMOGRAM 3/21/2019 LIPID PANEL Q1 4/12/2019 MEDICARE YEARLY EXAM 5/31/2019 *Topic was postponed. The date shown is not the original due date. Allergies as of 5/30/2018  Review Complete On: 5/30/2018 By: Surjit Gilman NP Severity Noted Reaction Type Reactions Tramadol Medium 07/29/2013    Hives, Rash Pcn [Penicillins]  10/05/2015    Rash, Nausea and Vomiting Shellfish Containing Products  08/17/2012    Shortness of Breath Statins-hmg-coa Reductase Inhibitors  01/27/2015   Side Effect Myalgia Current Immunizations  Reviewed on 4/21/2017 Name Date Influenza High Dose Vaccine PF 9/21/2017, 9/26/2016, 11/17/2015 Influenza Vaccine 11/8/2013  1:28 PM  
 Pneumococcal Conjugate (PCV-13) 12/20/2015 Pneumococcal Polysaccharide (PPSV-23) 11/8/2013  1:30 PM  
 Td, Adsorbed 10/11/2017 Zoster Vaccine, Live 1/1/2010 Not reviewed this visit You Were Diagnosed With   
  
 Codes Comments Hyperlipidemia, unspecified hyperlipidemia type    -  Primary ICD-10-CM: E78.5 ICD-9-CM: 272.4 BMI 40.0-44.9, adult [facility])     ICD-10-CM: Z68.41 
ICD-9-CM: V85.41 Encounter for Medicare annual wellness exam     ICD-10-CM: Z00.00 ICD-9-CM: V70.0 Hemorrhoids, unspecified hemorrhoid type     ICD-10-CM: K64.9 ICD-9-CM: 455.6 Essential hypertension, benign     ICD-10-CM: I10 
ICD-9-CM: 401.1 CKD (chronic kidney disease) stage 3, GFR 30-59 ml/min     ICD-10-CM: N18.3 ICD-9-CM: 322. 3 Gastroesophageal reflux disease without esophagitis     ICD-10-CM: K21.9 ICD-9-CM: 530.81 Vitals BP Pulse Temp Resp Height(growth percentile) 126/80 (BP 1 Location: Left arm, BP Patient Position: Sitting) 82 97.2 °F (36.2 °C) (Temporal) 28 4' 10\" (1.473 m) Weight(growth percentile) SpO2 BMI OB Status Smoking Status 210 lb 9.6 oz (95.5 kg) 94% 44.02 kg/m2 Hysterectomy Former Smoker Vitals History BMI and BSA Data Body Mass Index Body Surface Area 44.02 kg/m 2 1.98 m 2 Preferred Pharmacy Pharmacy Name Phone [address], [address] [place] [phone] Your Updated Medication List  
  
   
This list is accurate as of 5/30/18 10:43 AM.  Always use your most recent med list.  
  
  
  
  
 ACETAMINOPHEN EXTRA STRENGTH PO Take  by mouth as needed. allopurinol 300 mg tablet Commonly known as:  Perla Coy Take 1 Tab by mouth daily. Indications: Gout ALPRAZolam 0.5 mg tablet Commonly known as:  Saige Ochoa Take 1 Tab by mouth two (2) times daily as needed for Anxiety or Sleep. Max Daily Amount: 1 mg. Indications: ANXIETY  
  
 atorvastatin 20 mg tablet Commonly known as:  LIPITOR  
TAKE 1 TABLET EVERY DAY  
  
 azithromycin 250 mg tablet Commonly known as:  Olevia Dexter Take 2 tablets today, then take 1 tablet daily  Indications: bronchitis  
  
 bumetanide 1 mg tablet Commonly known as:  Tura Denisha TAKE 2 TABLETS TWICE DAILY  FOR  EDEMA  
  
 cholecalciferol 1,000 unit tablet Commonly known as:  VITAMIN D3 Take 2 Tabs by mouth daily. Indications: Vitamin D Deficiency DEXTROMETHORPHAN Take  by mouth.  
  
 esomeprazole 40 mg capsule Commonly known as:  Sonda Pacific Take 1 Cap by mouth daily. gabapentin 300 mg capsule Commonly known as:  NEURONTIN  
  
 hydrocortisone-pramoxine 1-1 % topical foam  
Commonly known as:  EPIFOAM  
Apply  to affected area three (3) times daily. isosorbide mononitrate ER 30 mg tablet Commonly known as:  IMDUR  
TAKE 1 TABLET EVERY DAY  
  
 metOLazone 2.5 mg tablet Commonly known as:  ZAROXOLYN  
2 tablets BID as needed for edema then maintenance dose of 2 tablets daily  Indications: PERIPHERAL EDEMA DUE TO CHRONIC HEART FAILURE  
  
 metoprolol succinate 100 mg tablet Commonly known as:  TOPROL-XL Take 1 Tab by mouth daily. Indications: heart, replaces plain metoprolol  
  
 nitroglycerin 0.4 mg SL tablet Commonly known as:  NITROSTAT  
1 Tab by SubLINGual route every five (5) minutes as needed. nystatin powder Commonly known as:  MYCOSTATIN Apply  to affected area three (3) times daily. OXYGEN-AIR DELIVERY SYSTEMS Take 2 L by inhalation continuous. psyllium packet Commonly known as:  METAMUCIL Take 1 Packet by mouth daily. tiotropium bromide 2.5 mcg/actuation inhaler Commonly known as:  Lupe [place] Take 2 Puffs by inhalation daily. triamcinolone acetonide 0.1 % ointment Commonly known as:  KENALOG Apply  to affected area two (2) times a day. use thin layer VENTOLIN HFA 90 mcg/actuation inhaler Generic drug:  albuterol  
  
 warfarin 5 mg tablet Commonly known as:  COUMADIN Take 5 mg by mouth daily. Indications: x 5 days  
  
 witch hazel-glycerin 12.5-50 % pad Commonly known as:  TUCKS  
1 Pad by PeriANAL route as needed for Pain. Prescriptions Sent to Pharmacy Refills  
 esomeprazole (NEXIUM) 40 mg capsule 1 Sig: Take 1 Cap by mouth daily. Class: Normal  
 Pharmacy: [address] [place] [address], [address] [place] [address] Ph #: [phone] Route: Oral  
 hydrocortisone-pramoxine (EPIFOAM) 1-1 % topical foam 0 Sig: Apply  to affected area three (3) times daily. Class: Normal  
 Pharmacy: [address] Ph #: [phone] Route: Topical  
 witch hazel-glycerin (TUCKS) 12.5-50 % pad 1 Si Pad by PeriANAL route as needed for Pain. Class: Normal  
 Pharmacy: [facility] [address] Ph #: [phone] Route: PeriANAL  
 azithromycin (ZITHROMAX) 250 mg tablet 0 Sig: Take 2 tablets today, then take 1 tablet daily  Indications: bronchitis Class: Normal  
 Pharmacy: [facility] [address] Ph #: [phone] We Performed the Following COLLECTION VENOUS BLOOD,VENIPUNCTURE [id number] CPT(R)] LIPID PANEL [99561 CPT(R)] Patient Instructions Hemorrhoids: Care Instructions Your Care Instructions Hemorrhoids are enlarged veins that develop in the anal canal. Bleeding during bowel movements, itching, swelling, and rectal pain are the most common symptoms. They can be uncomfortable at times, but hemorrhoids rarely are a serious problem. You can treat most hemorrhoids with simple changes to your diet and bowel habits.  These changes include eating more fiber and not straining to pass stools. Most hemorrhoids do not need surgery or other treatment unless they are very large and painful or bleed a lot. Follow-up care is a key part of your treatment and safety. Be sure to make and go to all appointments, and call your doctor if you are having problems. It's also a good idea to know your test results and keep a list of the medicines you take. How can you care for yourself at home? · Sit in a few inches of warm water (sitz bath) 3 times a day and after bowel movements. The warm water helps with pain and itching. · Put ice on your anal area several times a day for 10 minutes at a time. Put a thin cloth between the ice and your skin. Follow this by placing a warm, wet towel on the area for another 10 to 20 minutes. · Take pain medicines exactly as directed. ¨ If the doctor gave you a prescription medicine for pain, take it as prescribed. ¨ If you are not taking a prescription pain medicine, ask your doctor if you can take an over-the-counter medicine. · Keep the anal area clean, but be gentle. Use water and a fragrance-free soap, such as [place], or use baby wipes or medicated pads, such as Tucks. · Wear cotton underwear and loose clothing to decrease moisture in the anal area. · Eat more fiber. Include foods such as whole-grain breads and cereals, raw vegetables, raw and dried fruits, and beans. · Drink plenty of fluids, enough so that your urine is light yellow or clear like water. If you have kidney, heart, or liver disease and have to limit fluids, talk with your doctor before you increase the amount of fluids you drink. · Use a stool softener that contains bran or psyllium. You can save money by buying bran or psyllium (available in bulk at most health food stores) and sprinkling it on foods or stirring it into fruit juice. Or you can use a product such as Metamucil or Hydrocil. · Practice healthy bowel habits. ¨ Go to the bathroom as soon as you have the urge. ¨ Avoid straining to pass stools. Relax and give yourself time to let things happen naturally. ¨ Do not hold your breath while passing stools. ¨ Do not read while sitting on the toilet. Get off the toilet as soon as you have finished. · Take your medicines exactly as prescribed. Call your doctor if you think you are having a problem with your medicine. When should you call for help? Call 911 anytime you think you may need emergency care. For example, call if: 
? · You pass maroon or very bloody stools. ?Call your doctor now or seek immediate medical care if: 
? · You have increased pain. ? · You have increased bleeding. ? Watch closely for changes in your health, and be sure to contact your doctor if: 
? · Your symptoms have not improved after 3 or 4 days. Where can you learn more? Go to http://peter-susi.info/. Enter F228 in the search box to learn more about \"Hemorrhoids: Care Instructions. \" Current as of: May 12, 2017 Content Version: 11.4 © 8827-3871 [employer]. Care instructions adapted under license by [employer] (which disclaims liability or warranty for this information). If you have questions about a medical condition or this instruction, always ask your healthcare professional. [address] any warranty or liability for your use of this information. Dermoplast to spray on hemorrhoids Introducing [facility]! [facility] introduces ZANY OX patient portal. Now you can access parts of your medical record, email your doctor's office, and request medication refills online. 1. In your internet browser, go to [email or web address] ([employer]). [email or web address] ([employer]) 2. Click on the First Time User? Click Here link in the Sign In box. You will see the New Member Sign Up page. 3. Enter your ZANY OX Access Code exactly as it appears below. You will not need to use this code after youve completed the sign-up process.  If you do not sign up before the expiration date, you must request a new code. · [employer] Access Code: [email or web address] Expires: 8/28/2018 10:43 AM 
 
4. Enter the last four digits of your Social Security Number (xxxx) and Date of Birth (mm/dd/yyyy) as indicated and click Submit. You will be taken to the next sign-up page. 5. Create a [employer] ID. This will be your [employer] login ID and cannot be changed, so think of one that is secure and easy to remember. 6. Create a [employer] password. You can change your password at any time. 7. Enter your Password Reset Question and Answer. This can be used at a later time if you forget your password. 8. Enter your e-mail address. You will receive e-mail notification when new information is available in [address] 9. Click Sign Up. You can now view and download portions of your medical record. 10. Click the Download Summary menu link to download a portable copy of your medical information. If you have questions, please visit the Frequently Asked Questions section of the [employer] website. Remember, [employer] is NOT to be used for urgent needs. For medical emergencies, dial 911. Now available from your iPhone and Android! Please provide this summary of care documentation to your next provider. Your primary care clinician is listed as Freddie Wilson. If you have any questions after today's visit, please call [phone].

## 2018-05-30 NOTE — PATIENT INSTRUCTIONS
Hemorrhoids: Care Instructions  Your Care Instructions    Hemorrhoids are enlarged veins that develop in the anal canal. Bleeding during bowel movements, itching, swelling, and rectal pain are the most common symptoms. They can be uncomfortable at times, but hemorrhoids rarely are a serious problem. You can treat most hemorrhoids with simple changes to your diet and bowel habits. These changes include eating more fiber and not straining to pass stools. Most hemorrhoids do not need surgery or other treatment unless they are very large and painful or bleed a lot. Follow-up care is a key part of your treatment and safety. Be sure to make and go to all appointments, and call your doctor if you are having problems. It's also a good idea to know your test results and keep a list of the medicines you take. How can you care for yourself at home? · Sit in a few inches of warm water (sitz bath) 3 times a day and after bowel movements. The warm water helps with pain and itching. · Put ice on your anal area several times a day for 10 minutes at a time. Put a thin cloth between the ice and your skin. Follow this by placing a warm, wet towel on the area for another 10 to 20 minutes. · Take pain medicines exactly as directed. ¨ If the doctor gave you a prescription medicine for pain, take it as prescribed. ¨ If you are not taking a prescription pain medicine, ask your doctor if you can take an over-the-counter medicine. · Keep the anal area clean, but be gentle. Use water and a fragrance-free soap, such as [place], or use baby wipes or medicated pads, such as Tucks. · Wear cotton underwear and loose clothing to decrease moisture in the anal area. · Eat more fiber. Include foods such as whole-grain breads and cereals, raw vegetables, raw and dried fruits, and beans. · Drink plenty of fluids, enough so that your urine is light yellow or clear like water.  If you have kidney, heart, or liver disease and have to limit fluids, talk with your doctor before you increase the amount of fluids you drink. · Use a stool softener that contains bran or psyllium. You can save money by buying bran or psyllium (available in bulk at most health food stores) and sprinkling it on foods or stirring it into fruit juice. Or you can use a product such as Metamucil or Hydrocil. · Practice healthy bowel habits. ¨ Go to the bathroom as soon as you have the urge. ¨ Avoid straining to pass stools. Relax and give yourself time to let things happen naturally. ¨ Do not hold your breath while passing stools. ¨ Do not read while sitting on the toilet. Get off the toilet as soon as you have finished. · Take your medicines exactly as prescribed. Call your doctor if you think you are having a problem with your medicine. When should you call for help? Call 911 anytime you think you may need emergency care. For example, call if:  ? · You pass maroon or very bloody stools. ?Call your doctor now or seek immediate medical care if:  ? · You have increased pain. ? · You have increased bleeding. ? Watch closely for changes in your health, and be sure to contact your doctor if:  ? · Your symptoms have not improved after 3 or 4 days. Where can you learn more? Go to http://peter-susi.info/. Enter F228 in the search box to learn more about \"Hemorrhoids: Care Instructions. \"  Current as of: May 12, 2017  Content Version: 11.4  © 4403-0714 [employer]. Care instructions adapted under license by [email or web address] (which disclaims liability or warranty for this information). If you have questions about a medical condition or this instruction, always ask your healthcare professional. [place] [address] any warranty or liability for your use of this information.     Dermoplast to spray on hemorrhoids  Medicare Wellness Visit, Female    The best way to live healthy is to have a lifestyle where you eat a well-balanced diet, exercise regularly, limit alcohol use, and quit all forms of tobacco/nicotine, if applicable. Regular preventive services are another way to keep healthy. Preventive services (vaccines, screening tests, monitoring & exams) can help personalize your care plan, which helps you manage your own care. Screening tests can find health problems at the earliest stages, when they are easiest to treat. [facility] follows the current, evidence-based guidelines published by the [facility] ([facility]) when recommending preventive services for our patients. Because we follow these guidelines, sometimes recommendations change over time as research supports it. (For example, mammograms used to be recommended annually. Even though Medicare will still pay for an annual mammogram, the newer guidelines recommend a mammogram every two years for women of average risk.)    Of course, you and your provider may decide to screen more often for some diseases, based on your risk and co-morbidities (chronic disease you are already diagnosed with). Preventive services for you include:    - Medicare offers their members a free annual wellness visit, which is time for you and your primary care provider to discuss and plan for your preventive service needs. Take advantage of this benefit every year!    -All people over age 72 should receive the recommended pneumonia vaccines. Current USPSTF guidelines recommend a series of two vaccines for the best pneumonia protection.     -All adults should have a yearly flu vaccine and a tetanus vaccine every 10 years. All adults age 61 years should receive a shingles vaccine once in their lifetime.      -A bone mass density test is recommended when a woman turns 65 to screen for osteoporosis. This test is only recommended once as a screening.  Some providers will use this same test as a disease monitoring tool if you already have osteoporosis. -All adults age 38-68 years who are overweight should have a diabetes screening test once every three years.     -Other screening tests & preventive services for persons with diabetes include: an eye exam to screen for diabetic retinopathy, a kidney function test, a foot exam, and stricter control over your cholesterol.     -Cardiovascular screening for adults with routine risk involves an electrocardiogram (ECG) at intervals determined by the provider.     -Colorectal cancer screenings should be done for adults age 54-65 years with normal risk. There are a number of acceptable methods of screening for this type of cancer. Each test has its own benefits and drawbacks. Discuss with your provider what is most appropriate for you during your annual wellness visit. The different tests include: colonoscopy (considered the best screening method), a fecal occult blood test, a fecal DNA test, and sigmoidoscopy. -Breast cancer screenings are recommended every other year for women of normal risk age 54-69 years.     -Cervical cancer screenings for women over age 72 are only recommended with certain risk factors.     -All adults born between [facility] should be screened once for Hepatitis C.      Here is a list of your current Health Maintenance items (your personalized list of preventive services) with a due date:  Health Maintenance Due   Topic Date Due   Jr Didisydney Test  1945    Eye Exam  12/09/2014    Glaucoma Screening   12/09/2015    DTaP/Tdap/Td  (1 - Tdap) 10/12/2017

## 2018-05-30 NOTE — PROGRESS NOTES
This is the Subsequent Medicare Annual Wellness Exam, performed 12 months or more after the Initial AWV or the last Subsequent AWV    I have reviewed the patient's medical history in detail and updated the computerized patient record. History     Past Medical History:   Diagnosis Date    Adjustment disorder with depressed mood 2009    Allergic rhinitis due to other allergen 2011    Anemia, unspecified 2009    COPD     Coronary atherosclerosis of native coronary artery     Essential hypertension, benign 2008    GERD (gastroesophageal reflux disease)     Gout, unspecified 2008    Heart failure ([facility]     hx of, asymptomic now    Impaired glucose tolerance test 2012    Morbid obesity with BMI of 45.0-49.9, adult ([facility] 1/23/2015    Osteoporosis, unspecified 2009    Other and unspecified hyperlipidemia 2008    Unspecified hereditary and idiopathic peripheral neuropathy 2010    Unspecified vitamin D deficiency 2009      Past Surgical History:   Procedure Laterality Date    HX APPENDECTOMY      HX COLONOSCOPY      HX CORONARY STENT PLACEMENT  2000    LCx    HX HEART CATHETERIZATION  2000    HX HEART CATHETERIZATION  2012    LVEWDP 11, PCWP 11, Inf Hypo EF 45%, LAD 30%, mRCA 100% R to R and L to R collaterals    HX PTCA  2012    Unsuccessful attempt to opne RCA    HX ROBERT AND BSO       Current Outpatient Prescriptions   Medication Sig Dispense Refill    bumetanide (BUMEX) 1 mg tablet TAKE 2 TABLETS TWICE DAILY  FOR  EDEMA 360 Tab 4    esomeprazole (NEXIUM) 20 mg capsule Take 1 Cap by mouth daily. 90 Cap 3    allopurinol (ZYLOPRIM) 300 mg tablet Take 1 Tab by mouth daily. Indications: Gout 90 Tab 3    tiotropium bromide (SPIRIVA RESPIMAT) 2.5 mcg/actuation inhaler Take 2 Puffs by inhalation daily. 3 Inhaler 3    cholecalciferol (VITAMIN D3) 1,000 unit tablet Take 2 Tabs by mouth daily.  Indications: Vitamin D Deficiency 180 Tab 3    metoprolol succinate (TOPROL-XL) 100 mg tablet Take 1 Tab by mouth daily. Indications: heart, replaces plain metoprolol (Patient taking differently: Take 50 mg by mouth two (2) times a day. Indications: heart, replaces plain metoprolol) 90 Tab 3    azithromycin (ZITHROMAX) 250 mg tablet Take 250 mg by mouth four (4) times daily.  DEXTROMETHORPHAN Take  by mouth.  VENTOLIN HFA 90 mcg/actuation inhaler       warfarin (COUMADIN) 5 mg tablet Take 5 mg by mouth daily. Indications: x 5 days      atorvastatin (LIPITOR) 20 mg tablet TAKE 1 TABLET EVERY DAY 90 Tab 2    ACETAMINOPHEN EXTRA STRENGTH PO Take  by mouth as needed.  nitroglycerin (NITROSTAT) 0.4 mg SL tablet 1 Tab by SubLINGual route every five (5) minutes as needed. 25 Tab 3    famotidine (PEPCID) 40 mg tablet TAKE 1 TABLET EVERY DAY  FOR  GASTROESOPHAGEAL REFLUX 90 Tab 3    gabapentin (NEURONTIN) 300 mg capsule       metOLazone (ZAROXOLYN) 2.5 mg tablet 2 tablets BID as needed for edema then maintenance dose of 2 tablets daily  Indications: PERIPHERAL EDEMA DUE TO CHRONIC HEART FAILURE (Patient taking differently: Take 2.5 mg by mouth two (2) times a day. 2 tablets BID as needed for edema then maintenance dose of 2 tablets daily  Indications: Peripheral Edema due to Chronic Heart Failure) 240 Tab 5    isosorbide mononitrate ER (IMDUR) 30 mg tablet TAKE 1 TABLET EVERY DAY 90 Tab 3    triamcinolone acetonide (KENALOG) 0.1 % ointment Apply  to affected area two (2) times a day. use thin layer 30 g 3    ALPRAZolam (XANAX) 0.5 mg tablet Take 1 Tab by mouth two (2) times daily as needed for Anxiety or Sleep. Max Daily Amount: 1 mg. Indications: ANXIETY 45 Tab 1    nystatin (MYCOSTATIN) powder Apply  to affected area three (3) times daily. 60 g 3    OXYGEN-AIR DELIVERY SYSTEMS Take 2 L by inhalation continuous.        Allergies   Allergen Reactions    Tramadol Hives and Rash    Pcn [Penicillins] Rash and Nausea and Vomiting    Shellfish Containing Products Shortness of Breath    Statins-Hmg-Coa Reductase Inhibitors Myalgia     Family History   Problem Relation Age of Onset   [employer] Arthritis-osteo Mother     Diabetes Mother     Heart Disease Mother     Lung Disease Mother     Hypertension Sister     Asthma Sister     Asthma Brother     Heart Disease Maternal Grandmother     No Known Problems Brother     No Known Problems Sister     Kidney Disease Daughter     Lupus Daughter      Social History   Substance Use Topics    Smoking status: Former Smoker     Packs/day: 1.00     Years: 40.00     Quit date: 12/1/2013    Smokeless tobacco: Never Used    Alcohol use No     Patient Active Problem List   Diagnosis Code    COPD (chronic obstructive pulmonary disease) ([facility] J44.9    GERD (gastroesophageal reflux disease) K21.9    Essential hypertension, benign I10    Hyperlipidemia E78.5    Gout M10.9    Osteoporosis M81.0    Anemia D64.9    Morbid obesity with BMI of 45.0-49.9, adult ([facility] E66.01, Z68.42    Coronary atherosclerosis of native coronary artery I25.10    Heart failure ([facility] I50.9    Paroxysmal atrial fibrillation (HCC) I48.0    Long term current use of anticoagulant Z79.01    Bronchitis J40    Pruritic dermatitis L29.9    CYRIL on CPAP G47.33, Z99.89    Dry skin dermatitis L85.3    Chronic gout of multiple sites M1A. 200X0    CKD (chronic kidney disease) stage 3, GFR 30-59 ml/min N18.3       Depression Risk Factor Screening:     PHQ over the last two weeks 4/12/2018   Little interest or pleasure in doing things Several days   Feeling down, depressed or hopeless Several days   Total Score PHQ 2 2     Alcohol Risk Factor Screening: You do not drink alcohol or very rarely. Functional Ability and Level of Safety:   Hearing Loss  Hearing is good. Activities of Daily Living  The home contains: handrails  Patient does total self care    Fall Risk  Fall Risk Assessment, last 12 mths 4/12/2018   Able to walk? Yes   Fall in past 12 months?  No       Abuse Screen  Patient is not abused    Cognitive Screening   Evaluation of Cognitive Function:  Has your family/caregiver stated any concerns about your memory: no  Normal    Patient Care Team   Patient Care Team:  Ariane Quezada NP as PCP - General (Nurse Practitioner)  Geo Torres MD (Internal Medicine)  Sofía Hunt MD (Cardiology)    Assessment/Plan   Education and counseling provided:  Are appropriate based on today's review and evaluation    Diagnoses and all orders for this visit:    1. BMI 40.0-44.9, adult ([address]    2. Encounter for Medicare annual wellness exam    3. Hemorrhoids, unspecified hemorrhoid type      Epifoam, Tucks and Dermaplast  Health Maintenance Due   Topic Date Due    Hepatitis C Screening  1945    FOBT Q 1 YEAR AGE 50-75  11/13/1996    EYE EXAM RETINAL OR DILATED Q1  12/09/2014    GLAUCOMA SCREENING Q2Y  12/09/2015    DTaP/Tdap/Td series (1 - Tdap) 10/12/2017    MEDICARE YEARLY EXAM  04/22/2018     1. Have you been to the ER, urgent care clinic since your last visit? Hospitalized since your last visit?no    2. Have you seen or consulted any other health care providers outside of the [address] [place] [address] since your last visit? Include any pap smears or colon screening.   Yes, DR ROGER Pul[facility], Cardiologist and nephrologist within last two months    Leticia WEATHERSC

## 2018-05-30 NOTE — PROGRESS NOTES
Subjective: Gurpreet Castillo is a 71 y.o. female who presents today with the following:  Chief Complaint   Patient presents with    Annual Wellness Visit     subsequent    Hemorrhoids       Patient Active Problem List   Diagnosis Code    COPD (chronic obstructive pulmonary disease) ([facility] J44.9    GERD (gastroesophageal reflux disease) K21.9    Essential hypertension, benign I10    Hyperlipidemia E78.5    Gout M10.9    Osteoporosis M81.0    Anemia D64.9    Morbid obesity with BMI of 45.0-49.9, adult ([facility] E66.01, Z68.42    Coronary atherosclerosis of native coronary artery I25.10    Heart failure ([facility] I50.9    Paroxysmal atrial fibrillation ([facility] I48.0    Long term current use of anticoagulant Z79.01    Bronchitis J40    Pruritic dermatitis L29.9    CYRIL on CPAP G47.33, Z99.89    Dry skin dermatitis L85.3    Chronic gout of multiple sites M1A. 200X0    CKD (chronic kidney disease) stage 3, GFR 30-59 ml/min N18.3         COMPLIANT WITH MEDICATION:   HTN; Denies chest pain, dyspnea, palpitations, headache and blurred vision. Blood pressure normotensive. BMI: discussed BMI with her    Hemorrhoids: burning and stinging every time she uses the bathroom. Streak of blood with wiping with BM. Has tried preparation with minimal relief. Hyperlipidemia      CKD: followed by Dr. Antonella Gallegos    GERD: Stopped taking Prilosec. Nexium working better.        ROS:  Gen: denies fever, chills, fatigue, weight loss, weight gain  HEENT:denies blurry vision, nasal congestion, sore throat  Resp: denies dypsnea, cough, wheezing  CV: denies chest pain radiating to the jaws or arms, palpitations, lower extremity edema  Abd: denies nausea, vomiting, diarrhea, constipation  Neuro: denies numbness/tingling  Endo: denies polyuria, polydipsia, heat/cold intolerance  Heme: no lymphadenopathy    Allergies   Allergen Reactions    Tramadol Hives and Rash    Pcn [Penicillins] Rash and Nausea and Vomiting    Shellfish Containing Products Shortness of Breath    Statins-Hmg-Coa Reductase Inhibitors Myalgia         Current Outpatient Prescriptions:     psyllium (METAMUCIL) packet, Take 1 Packet by mouth daily. , Disp: , Rfl:     esomeprazole (NEXIUM) 40 mg capsule, Take 1 Cap by mouth daily. , Disp: 90 Cap, Rfl: 1    hydrocortisone-pramoxine (EPIFOAM) 1-1 % topical foam, Apply  to affected area three (3) times daily. , Disp: 1 Can, Rfl: 0    witch hazel-glycerin (TUCKS) 12.5-50 % pad, 1 Pad by PeriANAL route as needed for Pain., Disp: 40 Pad, Rfl: 1    azithromycin (ZITHROMAX) 250 mg tablet, Take 2 tablets today, then take 1 tablet daily  Indications: bronchitis, Disp: 6 Tab, Rfl: 0    bumetanide (BUMEX) 1 mg tablet, TAKE 2 TABLETS TWICE DAILY  FOR  EDEMA, Disp: 360 Tab, Rfl: 4    allopurinol (ZYLOPRIM) 300 mg tablet, Take 1 Tab by mouth daily. Indications: Gout, Disp: 90 Tab, Rfl: 3    tiotropium bromide (SPIRIVA RESPIMAT) 2.5 mcg/actuation inhaler, Take 2 Puffs by inhalation daily. , Disp: 3 Inhaler, Rfl: 3    cholecalciferol (VITAMIN D3) 1,000 unit tablet, Take 2 Tabs by mouth daily. Indications: Vitamin D Deficiency, Disp: 180 Tab, Rfl: 3    metoprolol succinate (TOPROL-XL) 100 mg tablet, Take 1 Tab by mouth daily. Indications: heart, replaces plain metoprolol (Patient taking differently: Take 50 mg by mouth two (2) times a day. Indications: heart, replaces plain metoprolol), Disp: 90 Tab, Rfl: 3    DEXTROMETHORPHAN, Take  by mouth., Disp: , Rfl:     VENTOLIN HFA 90 mcg/actuation inhaler, , Disp: , Rfl:     warfarin (COUMADIN) 5 mg tablet, Take 5 mg by mouth daily. Indications: x 5 days, Disp: , Rfl:     atorvastatin (LIPITOR) 20 mg tablet, TAKE 1 TABLET EVERY DAY, Disp: 90 Tab, Rfl: 2    ACETAMINOPHEN EXTRA STRENGTH PO, Take  by mouth as needed. , Disp: , Rfl:     nitroglycerin (NITROSTAT) 0.4 mg SL tablet, 1 Tab by SubLINGual route every five (5) minutes as needed. , Disp: 25 Tab, Rfl: 3    gabapentin (NEURONTIN) 300 mg capsule, , Disp: , Rfl:     metOLazone (ZAROXOLYN) 2.5 mg tablet, 2 tablets BID as needed for edema then maintenance dose of 2 tablets daily  Indications: PERIPHERAL EDEMA DUE TO CHRONIC HEART FAILURE (Patient taking differently: Take 2.5 mg by mouth two (2) times a day. 2 tablets BID as needed for edema then maintenance dose of 2 tablets daily  Indications: Peripheral Edema due to Chronic Heart Failure), Disp: 240 Tab, Rfl: 5    isosorbide mononitrate ER (IMDUR) 30 mg tablet, TAKE 1 TABLET EVERY DAY, Disp: 90 Tab, Rfl: 3    triamcinolone acetonide (KENALOG) 0.1 % ointment, Apply  to affected area two (2) times a day. use thin layer, Disp: 30 g, Rfl: 3    ALPRAZolam (XANAX) 0.5 mg tablet, Take 1 Tab by mouth two (2) times daily as needed for Anxiety or Sleep. Max Daily Amount: 1 mg. Indications: ANXIETY, Disp: 45 Tab, Rfl: 1    nystatin (MYCOSTATIN) powder, Apply  to affected area three (3) times daily. , Disp: 60 g, Rfl: 3    OXYGEN-AIR DELIVERY SYSTEMS, Take 2 L by inhalation continuous. , Disp: , Rfl:     Past Medical History:   Diagnosis Date    Adjustment disorder with depressed mood 2009    Allergic rhinitis due to other allergen 2011    Anemia, unspecified 2009    COPD     Coronary atherosclerosis of native coronary artery     Essential hypertension, benign 2008    GERD (gastroesophageal reflux disease)     Gout, unspecified 2008    Heart failure ([facility]     hx of, asymptomic now    Impaired glucose tolerance test 2012    Morbid obesity with BMI of 45.0-49.9, adult ([facility] 1/23/2015    Osteoporosis, unspecified 2009    Other and unspecified hyperlipidemia 2008    Unspecified hereditary and idiopathic peripheral neuropathy 2010    Unspecified vitamin D deficiency 2009       Past Surgical History:   Procedure Laterality Date    HX APPENDECTOMY      HX COLONOSCOPY      HX CORONARY STENT PLACEMENT  2000    LCx    HX HEART CATHETERIZATION  2000    [address] HEART CATHETERIZATION  2012    LVEWDP 11, PCWP 11, Inf Hypo EF 45%, LAD 30%, mRCA 100% R to R and L to R collaterals    HX PTCA  2012    Unsuccessful attempt to opne RCA    HX ROBERT AND BSO         History   Smoking Status    Former Smoker    Packs/day: 1.00    Years: 40.00    Quit date: 12/1/2013   Smokeless Tobacco    Never Used       Social History     Social History    Marital status:      Spouse name: N/A    Number of children: N/A    Years of education: N/A     Social History Main Topics    Smoking status: Former Smoker     Packs/day: 1.00     Years: 40.00     Quit date: 12/1/2013    Smokeless tobacco: Never Used    Alcohol use No    Drug use: No    Sexual activity: Not Asked     Other Topics Concern     Service No    Blood Transfusions Yes    Caffeine Concern No    Occupational Exposure No    Hobby Hazards No    Sleep Concern Yes    Stress Concern No    Weight Concern Yes    Special Diet No    Back Care Yes    Exercise No    Bike Helmet Yes    Seat Belt Yes    Self-Exams Yes     Social History Narrative       Family History   Problem Relation Age of Onset    Arthritis-osteo Mother     Diabetes Mother     Heart Disease Mother     Lung Disease Mother     Hypertension Sister     Asthma Sister     Asthma Brother     Heart Disease Maternal Grandmother     No Known Problems Brother     No Known Problems Sister     Kidney Disease Daughter     Lupus Daughter          Objective:     Visit Vitals    /80 (BP 1 Location: Left arm, BP Patient Position: Sitting)    Pulse 82    Temp 97.2 °F (36.2 °C) (Temporal)    Resp 28    Ht 4' 10\" (1.473 m)    Wt 210 lb 9.6 oz (95.5 kg)    SpO2 94%    BMI 44.02 kg/m2     Body mass index is 44.02 kg/(m^2). General: Alert and oriented. No acute distress. Well nourished, [place]es[place]  HEENT :  Ears:TMs are normal. Canals are clear. Eyes: pupils equal, round, react to light and accommodation. Extra ocular movements intact. Nose: patent.  Mouth and throat is clear.  Neck:supple full range of motion no thyromegaly. Trachea midline, No carotid bruits. No significant lymphadenopathy  Lungs[de-identified] clear to auscultation without wheezes, rales, or rhonchi. Heart :RRR, S1 & S2 are normal intensity. No murmur; no gallop  Abdomen: bowel sounds active. No tenderness, guarding, rebound, masses, hepatic or spleen enlargement  Back: no CVA tenderness. Extremities: without clubbing, cyanosis, + 2 pedal edema  Pulses: radial and femoral pulses are normal  Neuro: HMF intact. Cranial nerves II through XII grossly normal.  Motor: is 5 over 5 and symmetrical.   Deep tendon reflexes: +2 equal    Results for orders placed or performed in visit on 05/24/18   AMB EXT CREATININE   Result Value Ref Range    Creatinine, External 1.54    AMB EXT HGBA1C   Result Value Ref Range    Hemoglobin A1c, External 6.4        No results found for this visit on 05/30/18. Assessment/ Plan:     1. BMI 40.0-44.9, adult [facility])  Discussed the patient's BMI with her. The BMI follow up plan is as follows:     dietary management education, guidance, and counseling  encourage exercise  monitor weight  prescribed dietary intake    An After Visit Summary was printed and given to the patient. 2. Encounter for Medicare annual wellness exam  Notes attached    3. Hemorrhoids, unspecified hemorrhoid type    - witch hazel-glycerin (TUCKS) 12.5-50 % pad; 1 Pad by PeriANAL route as needed for Pain. Dispense: 40 Pad; Refill: 1    4. Essential hypertension, benign  BP in goal    5. Hyperlipidemia, unspecified hyperlipidemia type    - LIPID PANEL  - COLLECTION VENOUS BLOOD,VENIPUNCTURE    6. CKD (chronic kidney disease) stage 3, GFR 30-59 ml/min  Continue  Medical management    7. Gastroesophageal reflux disease without esophagitis        Orders Placed This Encounter    COLLECTION VENOUS BLOOD,VENIPUNCTURE    LIPID PANEL    psyllium (METAMUCIL) packet     Sig: Take 1 Packet by mouth daily.     DISCONTD: hydrocortisone-pramoxine (EPIFOAM) 1-1 % topical foam     Sig: Apply  to affected area three (3) times daily. Dispense:  1 Can     Refill:  0    DISCONTD: witch hazel-glycerin (TUCKS) 12.5-50 % pad     Si Pad by PeriANAL route as needed for Pain. Dispense:  40 Pad     Refill:  1    esomeprazole (NEXIUM) 40 mg capsule     Sig: Take 1 Cap by mouth daily. Dispense:  90 Cap     Refill:  1    hydrocortisone-pramoxine (EPIFOAM) 1-1 % topical foam     Sig: Apply  to affected area three (3) times daily. Dispense:  1 Can     Refill:  0    witch hazel-glycerin (TUCKS) 12.5-50 % pad     Si Pad by PeriANAL route as needed for Pain. Dispense:  40 Pad     Refill:  1    azithromycin (ZITHROMAX) 250 mg tablet     Sig: Take 2 tablets today, then take 1 tablet daily  Indications: bronchitis     Dispense:  6 Tab     Refill:  0         Verbal and written instructions (see AVS) provided.  Patient expresses understanding of diagnosis and treatment plan. Health Maintenance Due   Topic Date Due    Hepatitis C Screening  1945    EYE EXAM RETINAL OR DILATED Q1  2014    GLAUCOMA SCREENING Q2Y  2015    DTaP/Tdap/Td series (1 - Tdap) 10/12/2017         Follow-up Disposition:  Return in about 4 months (around 2018). or sooner as needed.       RADHA Marquez

## 2018-05-31 NOTE — PROGRESS NOTES
Has she had a history of myalgia to Lipitor on a higher dosage?    If so can switch to another statin with less concern with myalgia

## 2018-05-31 NOTE — PROGRESS NOTES
Lipid panel cholesterol levels increasing    Increase Lipitor to 40 mg  Does she need a new prescription?

## 2018-06-22 NOTE — PROGRESS NOTES
Patient was called, per Chinyere, she wanted to know if patient had her dosage been altered or had anything happened to explained the abnormal reading? A VM was left to return the call. Ms. Vashti Gary returned the call and stated that she might had eaten too many salads, but normally does not eat them that much. She stated that she will keep the same dosage and wait for the results of her next weeks reading before changing her dosage.

## 2018-07-18 NOTE — MR AVS SNAPSHOT
Pee Rodriguez 
 
 
 [address] [facility] [address] [id number] [phone] Patient: Eloisa Perez MRN: [id number] Visit Information Date & Time Provider Department Dept. Phone Encounter #  
 7/18/2018  1:40 PM Kane Zaman, [address] [facility] Cardiology [facility] [id number] Follow-up Instructions Return in about 6 months (around 1/18/2019). Follow-up and Disposition History Your Appointments 9/20/2018  9:30 AM  
ESTABLISHED PATIENT with MEME Zuniga 73 ([facility]) Appt Note: 4 mo F/U  
 [address] [address]  
[phone]  
  
   
 [facility] [phone]  
  
    
 3/5/2019 11:00 AM  
ESTABLISHED PATIENT with Kane Zaman MD  
[address] [facility] Appt Note: 6 mth fu  $0 cp  
 [address] [facility] 67 [address] [phone]  
  
   
 [address] [place] [address] Upcoming Health Maintenance Date Due Hepatitis C Screening 1945 EYE EXAM RETINAL OR DILATED Q1 12/9/2014 GLAUCOMA SCREENING Q2Y 12/9/2015 DTaP/Tdap/Td series (1 - Tdap) 10/12/2017 FOBT Q 1 YEAR AGE 50-75 5/30/2019* Influenza Age 5 to Adult 8/1/2018 FOOT EXAM Q1 9/25/2018 MICROALBUMIN Q1 10/10/2018 HEMOGLOBIN A1C Q6M 10/12/2018 BREAST CANCER SCRN MAMMOGRAM 3/21/2019 LIPID PANEL Q1 5/30/2019 MEDICARE YEARLY EXAM 5/31/2019 *Topic was postponed. The date shown is not the original due date. Allergies as of 7/18/2018  Review Complete On: 7/18/2018 By: Kane Zaman MD  
  
 Severity Noted Reaction Type Reactions Tramadol Medium 07/29/2013    Hives, Rash Pcn [Penicillins]  10/05/2015    Rash, Nausea and Vomiting Shellfish Containing Products  08/17/2012    Shortness of Breath Statins-hmg-coa Reductase Inhibitors  01/27/2015   Side Effect Myalgia Current Immunizations  Reviewed on 4/21/2017 Name Date Influenza High Dose Vaccine PF 9/21/2017, 9/26/2016, 11/17/2015 Influenza Vaccine 11/8/2013  1:28 PM  
 Pneumococcal Conjugate (PCV-13) 12/20/2015 Pneumococcal Polysaccharide (PPSV-23) 11/8/2013  1:30 PM  
 Td, Adsorbed 10/11/2017 Zoster Vaccine, Live 1/1/2010 Not reviewed this visit You Were Diagnosed With   
  
 Codes Comments Paroxysmal atrial fibrillation (HCC)    -  Primary ICD-10-CM: I48.0 ICD-9-CM: 427.31 Chronic combined systolic and diastolic heart failure (HCC)     ICD-10-CM: I50.42 
ICD-9-CM: 428.42 Essential hypertension, benign     ICD-10-CM: I10 
ICD-9-CM: 266. 1 Chronic bronchitis, unspecified chronic bronchitis type ([facility]     ICD-10-CM: O42 ICD-9-CM: 491.9 CYRIL on CPAP     ICD-10-CM: G47.33, Z99.89 ICD-9-CM: 327.23, V46.8 Uncontrolled type 2 diabetes mellitus with complication, unspecified whether long term insulin use (HCC)     ICD-10-CM: E11.8, E11.65 ICD-9-CM: 250.92 CKD (chronic kidney disease) stage 3, GFR 30-59 ml/min     ICD-10-CM: N18.3 ICD-9-CM: 642. 3 Pure hypercholesterolemia     ICD-10-CM: E78.00 ICD-9-CM: 272.0 Atherosclerosis of native coronary artery of native heart without angina pectoris     ICD-10-CM: I25.10 ICD-9-CM: 414.01 Morbid obesity with BMI of 45.0-49.9, adult (HCC)     ICD-10-CM: E66.01, Z68.42 
ICD-9-CM: 278.01, V85.42 Vitals BP Pulse Resp Height(growth percentile) Weight(growth percentile) SpO2  
 126/60 (BP 1 Location: Left arm, BP Patient Position: Sitting) 81 16 4' 10\" (1.473 m) 204 lb (92.5 kg) 92% BMI OB Status Smoking Status 42.64 kg/m2 Hysterectomy Former Smoker Vitals History BMI and BSA Data Body Mass Index Body Surface Area  
 42.64 kg/m 2 1.95 m 2 Preferred Pharmacy Pharmacy Name Phone [address] [phone] Your Updated Medication List  
  
   
This list is accurate as of 7/18/18  2:52 PM.  Always use your most recent med list.  
  
  
  
  
 ACETAMINOPHEN EXTRA STRENGTH PO Take  by mouth as needed. * albuterol-ipratropium 2.5 mg-0.5 mg/3 ml Nebu Commonly known as:  DUO-NEB  
3 mL by Nebulization route two (2) times a day. * COMBIVENT RESPIMAT  mcg/actuation inhaler Generic drug:  ipratropium-albuterol Take 1 Puff by inhalation as needed for Wheezing. allopurinol 300 mg tablet Commonly known as:  Bernett Murali Take 1 Tab by mouth daily. Indications: Gout ALPRAZolam 0.5 mg tablet Commonly known as:  Tim Roy Take 1 Tab by mouth two (2) times daily as needed for Anxiety or Sleep. Max Daily Amount: 1 mg. Indications: ANXIETY  
  
 atorvastatin 40 mg tablet Commonly known as:  LIPITOR Take 1 Tab by mouth daily. Indications: atherosclerotic cardiovascular disease, hypertriglyceridemia  
  
 bumetanide 1 mg tablet Commonly known as:  Marisue Days TAKE 2 TABLETS TWICE DAILY  FOR  EDEMA  
  
 cholecalciferol 1,000 unit tablet Commonly known as:  VITAMIN D3 Take 2 Tabs by mouth daily. Indications: Vitamin D Deficiency  
  
 esomeprazole 40 mg capsule Commonly known as:  Nereida Bergamo Take 1 Cap by mouth daily. isosorbide mononitrate ER 30 mg tablet Commonly known as:  IMDUR  
TAKE 1 TABLET EVERY DAY  
  
 metoprolol succinate 100 mg tablet Commonly known as:  TOPROL-XL Take 1 Tab by mouth daily. Indications: heart, replaces plain metoprolol NEBULIZER  
by Does Not Apply route. nitroglycerin 0.4 mg SL tablet Commonly known as:  NITROSTAT  
1 Tab by SubLINGual route every five (5) minutes as needed. nystatin powder Commonly known as:  MYCOSTATIN Apply  to affected area three (3) times daily. OXYGEN-AIR DELIVERY SYSTEMS Take 2 L by inhalation continuous. tiotropium bromide 2.5 mcg/actuation inhaler Commonly known as:  Relda Grime Take 2 Puffs by inhalation daily. triamcinolone acetonide 0.1 % ointment Commonly known as:  KENALOG Apply  to affected area two (2) times a day. use thin layer VENTOLIN HFA 90 mcg/actuation inhaler Generic drug:  albuterol  
  
 warfarin 5 mg tablet Commonly known as:  COUMADIN Take 5 mg by mouth daily. Indications: x 5 days * Notice: This list has 2 medication(s) that are the same as other medications prescribed for you. Read the directions carefully, and ask your doctor or other care provider to review them with you. Follow-up Instructions Return in about 6 months (around 1/18/2019). Introducing [facility]! Renetta Goyal introduces [employer] patient portal. Now you can access parts of your medical record, email your doctor's office, and request medication refills online. 1. In your internet browser, go to [email or web address] 2. Click on the First Time User? Click Here link in the Sign In box. You will see the New Member Sign Up page. 3. Enter your [employer] Access Code exactly as it appears below. You will not need to use this code after youve completed the sign-up process. If you do not sign up before the expiration date, you must request a new code. · [employer] Access Code: [email or web address] Expires: 8/28/2018 10:43 AM 
 
4. Enter the last four digits of your Social Security Number (xxxx) and Date of Birth (mm/dd/yyyy) as indicated and click Submit. You will be taken to the next sign-up page. 5. Create a [employer] ID. This will be your Flooved login ID and cannot be changed, so think of one that is secure and easy to remember. 6. Create a [employer] password. You can change your password at any time. 7. Enter your Password Reset Question and Answer. This can be used at a later time if you forget your password. 8. Enter your e-mail address. You will receive e-mail notification when new information is available in [address] 9. Click Sign Up. You can now view and download portions of your medical record. 10. Click the Download Summary menu link to download a portable copy of your medical information. If you have questions, please visit the Frequently Asked Questions section of the [employer] website. Remember, [employer] is NOT to be used for urgent needs. For medical emergencies, dial 911. Now available from your iPhone and Android! Please provide this summary of care documentation to your next provider. Your primary care clinician is listed as Farhan Yousif. If you have any questions after today's visit, please call [phone].

## 2018-07-18 NOTE — PROGRESS NOTES
PATIENT ID VERIFIED WITH TWO PATIENT IDENTIFIERS. PATIENT MEDICATIONS REVIEWED AND APPROVED BY DR. Speedy Gramajo. MEDICATIONS THAT WERE REMOVED FROM THIS VISIT HAVE BEEN APPROVED BY DR. Speedy Gramajo. Chief Complaint   Patient presents with    Coronary Artery Disease     3 month follow up    Irregular Heart Beat    Hypertension    Cholesterol Problem       1. Have you been to the ER, urgent care clinic since your last visit? Hospitalized since your last visit? YES [facility] ER-vomiting/diarrhea  May 2018    2. Have you seen or consulted any other health care providers outside of the [address] [place] [address] since your last visit?   Yes Pulmonary in [address] for second opinion June 2018, Cardiologist in [address] for second opinion, Kidney doctor is [address] for second opinion

## 2018-07-18 NOTE — PROGRESS NOTES
Susan Greenfield is a 71 y.o. female is here for routine f/u. Seen in [facility] by Pulmonary, Cardiology, and Nephrology. Morena Clipper has been stopped. Meds adjusted. Stable PHAM, no specific CV sx or complaints. Remains on home O2. The patient denies chest pain, orthopnea, PND, LE edema, palpitations, syncope, presyncope.        Patient Active Problem List    Diagnosis Date Noted    Chronic gout of multiple sites 04/21/2017    CKD (chronic kidney disease) stage 3, GFR 30-59 ml/min 04/21/2017    Dry skin dermatitis 07/22/2016    CYRIL on CPAP 05/03/2016    Pruritic dermatitis 04/15/2016    Bronchitis 10/01/2015    Long term current use of anticoagulant 08/25/2015    [facility] 06/22/2015    Heart failure ([facility]     Coronary atherosclerosis of native coronary artery     Morbid obesity with BMI of 45.0-49.9, adult ([facility] 01/23/2015    GERD (gastroesophageal reflux disease)     Essential hypertension, benign     Hyperlipidemia     Gout     Osteoporosis     Anemia     COPD (chronic obstructive pulmonary disease) ([facility] 08/18/2012      Sis Puentes NP  Past Medical History:   Diagnosis Date    Adjustment disorder with depressed mood 2009    Allergic rhinitis due to other allergen 2011    Anemia, unspecified 2009    COPD     Coronary atherosclerosis of native coronary artery     Essential hypertension, benign 2008    GERD (gastroesophageal reflux disease)     Gout, unspecified 2008    Heart failure ([facility]     hx of, asymptomic now    Impaired glucose tolerance test 2012    Morbid obesity with BMI of 45.0-49.9, adult ([facility] 1/23/2015    Osteoporosis, unspecified 2009    Other and unspecified hyperlipidemia 2008    Unspecified hereditary and idiopathic peripheral neuropathy 2010    Unspecified vitamin D deficiency 2009      Past Surgical History:   Procedure Laterality Date    HX APPENDECTOMY      HX COLONOSCOPY      HX CORONARY STENT PLACEMENT  2000    LCx  HX HEART CATHETERIZATION  2000    HX HEART CATHETERIZATION  2012    LVEWDP 11, PCWP 11, Inf Hypo EF 45%, LAD 30%, mRCA 100% R to R and L to R collaterals    HX PTCA  2012    Unsuccessful attempt to opne RCA    HX ROBERT AND BSO       Allergies   Allergen Reactions    Tramadol Hives and Rash    Pcn [Penicillins] Rash and Nausea and Vomiting    Shellfish Containing Products Shortness of Breath    Statins-Hmg-Coa Reductase Inhibitors Myalgia      Family History   Problem Relation Age of Onset    Arthritis-osteo Mother     Diabetes Mother     Heart Disease Mother     Lung Disease Mother     Hypertension Sister     Asthma Sister     Asthma Brother     Heart Disease Maternal Grandmother     No Known Problems Brother     No Known Problems Sister     Kidney Disease Daughter     Lupus Daughter       Social History     Social History    Marital status:      Spouse name: N/A    Number of children: N/A    Years of education: N/A     Occupational History    Not on file. Social History Main Topics    Smoking status: Former Smoker     Packs/day: 1.00     Years: 40.00     Quit date: 12/1/2013    Smokeless tobacco: Never Used    Alcohol use No    Drug use: No    Sexual activity: Not on file     Other Topics Concern     Service No    Blood Transfusions Yes    Caffeine Concern No    Occupational Exposure No    Hobby Hazards No    Sleep Concern Yes    Stress Concern No    Weight Concern Yes    Special Diet No    Back Care Yes    Exercise No    Bike Helmet Yes    Seat Belt Yes    Self-Exams Yes     Social History Narrative      Current Outpatient Prescriptions   Medication Sig    NEBULIZER by Does Not Apply route.  albuterol-ipratropium (DUO-NEB) 2.5 mg-0.5 mg/3 ml nebu 3 mL by Nebulization route two (2) times a day.  ipratropium-albuterol (COMBIVENT RESPIMAT)  mcg/actuation inhaler Take 1 Puff by inhalation as needed for Wheezing.     triamcinolone acetonide (KENALOG) 0.1 % ointment Apply  to affected area two (2) times a day. use thin layer    atorvastatin (LIPITOR) 40 mg tablet Take 1 Tab by mouth daily. Indications: atherosclerotic cardiovascular disease, hypertriglyceridemia (Patient taking differently: Take 20 mg by mouth daily. Indications: atherosclerotic cardiovascular disease, hypertriglyceridemia)    esomeprazole (NEXIUM) 40 mg capsule Take 1 Cap by mouth daily.  bumetanide (BUMEX) 1 mg tablet TAKE 2 TABLETS TWICE DAILY  FOR  EDEMA    allopurinol (ZYLOPRIM) 300 mg tablet Take 1 Tab by mouth daily. Indications: Gout    cholecalciferol (VITAMIN D3) 1,000 unit tablet Take 2 Tabs by mouth daily. Indications: Vitamin D Deficiency    metoprolol succinate (TOPROL-XL) 100 mg tablet Take 1 Tab by mouth daily. Indications: heart, replaces plain metoprolol    warfarin (COUMADIN) 5 mg tablet Take 5 mg by mouth daily. Indications: x 5 days    ACETAMINOPHEN EXTRA STRENGTH PO Take  by mouth as needed.  isosorbide mononitrate ER (IMDUR) 30 mg tablet TAKE 1 TABLET EVERY DAY    nystatin (MYCOSTATIN) powder Apply  to affected area three (3) times daily. (Patient taking differently: Apply  to affected area three (3) times daily. Patient is using a cream once a day)    OXYGEN-AIR DELIVERY SYSTEMS Take 2 L by inhalation continuous.  tiotropium bromide (SPIRIVA RESPIMAT) 2.5 mcg/actuation inhaler Take 2 Puffs by inhalation daily.  VENTOLIN HFA 90 mcg/actuation inhaler     nitroglycerin (NITROSTAT) 0.4 mg SL tablet 1 Tab by SubLINGual route every five (5) minutes as needed.  ALPRAZolam (XANAX) 0.5 mg tablet Take 1 Tab by mouth two (2) times daily as needed for Anxiety or Sleep. Max Daily Amount: 1 mg. Indications: ANXIETY     No current facility-administered medications for this visit. Review of Symptoms:    CONST  No weight change.  No fever, chills, sweats    ENT No visual changes, URI sx, sore throat    CV  See HPI   RESP  No cough, or sputum, wheezing. Also see HPI   GI  No abdominal pain or change in bowel habits. No heartburn or dysphagia. No melena or rectal bleeding.   No dysuria, urgency, frequency, hematuria   MSKEL  No joint pain, swelling. No muscle pain. SKIN  No rash or lesions. NEURO  No headache, syncope, or seizure. No weakness, loss of sensation, or paresthesias. PSYCH  No low mood or depression  No anxiety. HE/LYMPH  No easy bruising, abnormal bleeding, or enlarged glands.         Physical ExamPhysical Exam:    Visit Vitals    /60 (BP 1 Location: Left arm, BP Patient Position: Sitting)    Pulse 81    Resp 16    Ht 4' 10\" (1.473 m)    Wt 204 lb (92.5 kg)    SpO2 92%    BMI 42.64 kg/m2     Gen: NAD  HEENT:  PERRL, throat clear  Neck: no adenopathy, no thyromegaly, no JVD   Heart:  irregular,Nl S1S2,  II/VI murmur, no gallop or rub.   Lungs:  clear  Abdomen:   Soft, non-tender, bowel sounds are active.   Extremities:  No edema  Pulse: symmetric  Neuro: A&O times 3, No focal neuro deficits      Labs:   Lab Results   Component Value Date/Time    Sodium 142 10/27/2017 09:46 AM    Sodium 146 (H) 10/10/2017 04:17 PM    Sodium 140 09/21/2017 11:15 AM    Sodium 142 06/23/2017 11:59 AM    Sodium 140 04/21/2017 11:21 AM    Potassium 3.7 10/27/2017 09:46 AM    Potassium 4.4 10/10/2017 04:17 PM    Potassium 4.8 09/21/2017 11:15 AM    Potassium 5.1 06/23/2017 11:59 AM    Potassium 4.8 04/21/2017 11:21 AM    Chloride 93 (L) 10/27/2017 09:46 AM    Chloride 95 (L) 10/10/2017 04:17 PM    Chloride 92 (L) 09/21/2017 11:15 AM    Chloride 96 06/23/2017 11:59 AM    Chloride 96 04/21/2017 11:21 AM    CO2 29 10/27/2017 09:46 AM    CO2 28 10/10/2017 04:17 PM    CO2 31 (H) 09/21/2017 11:15 AM    CO2 27 06/23/2017 11:59 AM    CO2 25 04/21/2017 11:21 AM    Anion gap 7 08/18/2012 04:00 AM    Glucose 205 (H) 10/27/2017 09:46 AM    Glucose 120 (H) 10/10/2017 04:17 PM    Glucose 152 (H) 09/21/2017 11:15 AM    Glucose 104 (H) 06/23/2017 11:59 AM    Glucose 152 (H) 04/21/2017 11:21 AM    BUN 96 (HH) 10/27/2017 09:46 AM    BUN 82 (HH) 10/10/2017 04:17 PM    BUN 81 (HH) 09/21/2017 11:15 AM    BUN 34 (H) 06/23/2017 11:59 AM    BUN 59 (H) 04/21/2017 11:21 AM    Creatinine 2.48 (H) 10/27/2017 09:46 AM    Creatinine 1.75 (H) 10/10/2017 04:17 PM    Creatinine 2.01 (H) 09/21/2017 11:15 AM    Creatinine 1.37 (H) 06/23/2017 11:59 AM    Creatinine 1.54 (H) 04/21/2017 11:21 AM    BUN/Creatinine ratio 39 (H) 10/27/2017 09:46 AM    BUN/Creatinine ratio 47 (H) 10/10/2017 04:17 PM    BUN/Creatinine ratio 40 (H) 09/21/2017 11:15 AM    BUN/Creatinine ratio 25 06/23/2017 11:59 AM    BUN/Creatinine ratio 38 (H) 04/21/2017 11:21 AM    GFR est AA 22 (L) 10/27/2017 09:46 AM    GFR est AA 34 (L) 10/10/2017 04:17 PM    GFR est AA 28 (L) 09/21/2017 11:15 AM    GFR est AA 45 (L) 06/23/2017 11:59 AM    GFR est AA 39 (L) 04/21/2017 11:21 AM    GFR est non-AA 19 (L) 10/27/2017 09:46 AM    GFR est non-AA 29 (L) 10/10/2017 04:17 PM    GFR est non-AA 25 (L) 09/21/2017 11:15 AM    GFR est non-AA 39 (L) 06/23/2017 11:59 AM    GFR est non-AA 34 (L) 04/21/2017 11:21 AM    Calcium 9.8 10/27/2017 09:46 AM    Calcium 10.6 (H) 10/10/2017 04:17 PM    Calcium 9.7 09/21/2017 11:15 AM    Calcium 10.1 06/23/2017 11:59 AM    Calcium 9.8 04/21/2017 11:21 AM    Bilirubin, total 0.3 10/10/2017 04:17 PM    Bilirubin, total 0.3 09/21/2017 11:15 AM    Bilirubin, total 0.4 04/21/2017 11:21 AM    Bilirubin, total 0.2 02/24/2017 10:00 AM    Bilirubin, total <0.2 08/02/2016 01:50 PM    AST (SGOT) 15 10/10/2017 04:17 PM    AST (SGOT) 8 09/21/2017 11:15 AM    AST (SGOT) 11 04/21/2017 11:21 AM    AST (SGOT) 9 02/24/2017 10:00 AM    AST (SGOT) 10 08/02/2016 01:50 PM    Alk. phosphatase 120 (H) 10/10/2017 04:17 PM    Alk. phosphatase 122 (H) 09/21/2017 11:15 AM    Alk. phosphatase 131 (H) 04/21/2017 11:21 AM    Alk. phosphatase 121 (H) 02/24/2017 10:00 AM    Alk.  phosphatase 117 08/02/2016 01:50 PM    Protein, total 7.3 10/10/2017 04:17 PM    Protein, total 6.8 09/21/2017 11:15 AM    Protein, total 6.9 04/21/2017 11:21 AM    Protein, total 6.8 02/24/2017 10:00 AM    Protein, total 6.3 08/02/2016 01:50 PM    Albumin 4.3 10/10/2017 04:17 PM    Albumin 4.2 09/21/2017 11:15 AM    Albumin 4.2 04/21/2017 11:21 AM    Albumin 4.1 02/24/2017 10:00 AM    Albumin 4.2 09/13/2016 02:35 PM    A-G Ratio 1.4 10/10/2017 04:17 PM    A-G Ratio 1.6 09/21/2017 11:15 AM    A-G Ratio 1.6 04/21/2017 11:21 AM    A-G Ratio 1.5 02/24/2017 10:00 AM    A-G Ratio 1.6 08/02/2016 01:50 PM    ALT (SGPT) 11 10/10/2017 04:17 PM    ALT (SGPT) 9 09/21/2017 11:15 AM    ALT (SGPT) 10 04/21/2017 11:21 AM    ALT (SGPT) 11 02/24/2017 10:00 AM    ALT (SGPT) 13 08/02/2016 01:50 PM     No results found for: CPK, CPKX, CPX  Lab Results   Component Value Date/Time    Cholesterol, total 245 (H) 05/30/2018 10:34 AM    Cholesterol, total 190 04/12/2018 09:26 AM    Cholesterol, total 276 (H) 04/21/2017 11:21 AM    Cholesterol, total 236 (H) 02/24/2017 10:00 AM    Cholesterol, total 195 07/22/2016 09:42 AM    HDL Cholesterol 55 05/30/2018 10:34 AM    HDL Cholesterol 50 04/12/2018 09:26 AM    HDL Cholesterol 65 04/21/2017 11:21 AM    HDL Cholesterol 56 02/24/2017 10:00 AM    HDL Cholesterol 51 07/22/2016 09:42 AM    LDL, calculated 115 (H) 05/30/2018 10:34 AM    LDL, calculated 80 04/12/2018 09:26 AM    LDL, calculated 139 (H) 04/21/2017 11:21 AM    LDL, calculated 126 (H) 02/24/2017 10:00 AM    LDL, calculated 88 07/22/2016 09:42 AM    Triglyceride 375 (H) 05/30/2018 10:34 AM    Triglyceride 300 (H) 04/12/2018 09:26 AM    Triglyceride 358 (H) 04/21/2017 11:21 AM    Triglyceride 272 (H) 02/24/2017 10:00 AM    Triglyceride 280 (H) 07/22/2016 09:42 AM     No results found for this or any previous visit.     Assessment:         Patient Active Problem List    Diagnosis Date Noted    Chronic gout of multiple sites 04/21/2017    CKD (chronic kidney disease) stage 3, GFR 30-59 ml/min 04/21/2017    Dry skin dermatitis 07/22/2016    CYRIL on CPAP 05/03/2016    Pruritic dermatitis 04/15/2016    Bronchitis 10/01/2015    Long term current use of anticoagulant 08/25/2015    Paroxysmal atrial fibrillation (HCC) 06/22/2015    Heart failure (HCC)     Coronary atherosclerosis of native coronary artery     Morbid obesity with BMI of 45.0-49.9, adult ([facility] 01/23/2015    GERD (gastroesophageal reflux disease)     Essential hypertension, benign     Hyperlipidemia     Gout     Osteoporosis     Anemia     COPD (chronic obstructive pulmonary disease) ([facility] 08/18/2012     Seen in [facility] by Pulmonary, Cardiology, and Nephrology. Franceen Wood has been stopped. Meds adjusted. Stable PHAM, no specific CV sx or complaints. Remains on home O2. T     Plan:     Doing well with no adverse cardiac symptoms. F/u with PCP as planned  Continue current meds  Lipids and labs followed by PCP. Continue current care and f/u in 6 months.     Krunal Regalado MD

## 2018-08-14 NOTE — PROGRESS NOTES
HR and BP check only--tachycardic earlier, now ok.   EKG with NSR, HR 72, PRWP, low voltage, no acute changes

## 2018-08-14 NOTE — PROGRESS NOTES
Verified patient with two patient identifiers. Call from this morning:   Pt states that at 5am she checked her O2 level and noticed her heart rate goes from 80 to 145 bpm.  Pt took her medications (including metoprolol xl) at 7 am.  Pt is feeling weak. Pt is taking warfarin. Pt has been trouble sleeping due to a new cream Dr. Daljit Tatum prescribed and the irritation is getting worse. Asked about benadryl. Pt has tried but after 2 hours she starts to itch again. Advised that she call Dr. Daljit Tatum regarding the reaction. Pt informed me that the weakness is better. Pt informed me during the visit that she has weight watchers on mondays. She did not hydrate well yesterday. Encouraged hydration. VITAL SIGNS - completed  EKG - completed    Discussed with Dr. Anamaria Faust. No changes. Hydrated better. No LOS.

## 2018-08-14 NOTE — TELEPHONE ENCOUNTER
Pt states she started with a-fib this morning around 5 am and it has continued. -145. Pt took all of her medications this morning around 7 am.    Requesting c/b to advise.

## 2018-08-14 NOTE — TELEPHONE ENCOUNTER
Spoke with the patient. Verified patient with two patient identifiers. Pt states that at 5am she checked her O2 level and noticed her heart rate goes from 80 to 145 bpm.  Pt took her medications (including metoprolol xl) at 7 am.  Pt is feeling weak. Pt is taking warfarin. Pt has been trouble sleeping due to a new cream Dr. Flori Santizo prescribed and the irritation is getting worse. Asked about benadryl. Pt has tried but after 2 hours she starts to itch again. Advised that she call Dr. Flori Santizo regarding the reaction. Spoke with Dr. Frannie Lucia regarding the elevated HR and weakness. Per Dr. Frannie Lucia:  If HR is still elevated and still weak after 30-60 minutes go to ER. Informed the pt of Dr. Frannie Lucia response. Advised that she stay out of the heat, hydrate, and rest.    Pt is asking if she should come with her  today since he has an appt and maybe she can be seen. I advised her of Dr. Frannie Lucia response again and said that if her sx's/HR even out then we can do a VS check/EKG this afternoon but she SHOULD go to ER if sx's/HR does not get better beforehand. Patient verbalized understanding.

## 2018-11-05 NOTE — ADDENDUM NOTE
After Visit Summary   11/5/2018    Lilli Steven    MRN: [id number]           Patient Information     Date Of Birth          1949        Visit Information        Provider Department      11/5/2018 3:40 PM Hermes Frias MD [facility]        Today's Diagnoses     Spasmodic torticollis    -  1    Dystonia, torsion, fragments of           Follow-ups after your visit        Follow-up notes from your care team     Return in about 12 weeks (around 1/28/2019).      Your next 10 appointments already scheduled     Nov 08, 2018  4:40 PM CST   MA SCREENING BILATERAL W/ RADHA with [id number]   [facility] ([facility])    [address] [place] [address], [address]  [facility] [address] [phone]           How do I prepare for my exam? (Food and drink instructions) No Food and Drink Restrictions.  How do I prepare for my exam? (Other instructions) Do not use any powder, lotion or deodorant under your arms or on your breast. If you do, we will ask you to remove it before your exam.  What should I wear: Wear comfortable, two-piece clothing.  How long does the exam take: Most scans will take 15 minutes.  What should I bring: Bring any previous mammograms from other facilities or have them mailed to the breast center.  Do I need a :  No  is needed.  What do I need to tell my doctor: If you have any allergies, tell your care team.  What should I do after the exam: No restrictions, You may resume normal activities.  What is this test: This test is an x-ray of the breast to look for breast disease. The breast is pressed between two plates to flatten and spread the tissue. An X-ray is taken of the breast from different angles.  Who should I call with questions: If you have any questions, please call the Imaging Department where you will have your exam. Directions, parking instructions, and other information is available on our  Addended by: Stormy Vivar on: 9/29/2017 02:37 PM     Modules accepted: Orders "website, [email or web address].  Other information about my exam Three-dimensional (3D) mammograms are available at [facility] locations in [facility], [place], [place], [facility], [place], and [place]. [facility] locations include [place] and [facility] in [place].  Benefits of 3D mammograms include: * Improved rate of cancer detection * Decreases your chance of having to go back for more tests, which means fewer: * \"False-positive\" results (This means that there is an abnormal area but it isn't cancer.) * Invasive testing procedures, such as a biopsy or surgery * Can provide clearer images of the breast if you have dense breast tissue.  *3D mammography is an optional exam that anyone can have with a 2D mammogram. It doesn't replace or take the place of a 2D mammogram. 2D mammograms remain an effective screening test for all women.  Not all insurance companies cover the cost of a 3D mammogram. Check with your insurance.            Nov 13, 2018  4:30 PM CST   Pre-Op physical with Vargas Chaudhry MD   [facility] ([facility])    [address] [facility] [address] [phone]            Nov 29, 2018   Procedure with Inés Harrell MD   [facility] (--)    [address]  [facility] [address]   [phone]            Jan 16, 2019  3:40 PM CST   (Arrive by 3:25 PM)   Return Botox with Hermes Frias MD   [facility] ([facility])    [address] [place] [id number] [phone]            Mar 25, 2019  3:40 PM CDT   (Arrive by 3:25 PM)   Return Botox with Hermes Frias MD   [facility] ([facility])    [address] [id number] [phone]              Future tests that were ordered for you today  " "   Open Future Orders        Priority Expected Expires Ordered    MA Screen Bilateral w/Rodrigo Routine  11/5/2019 11/5/2018            Who to contact     Please call your clinic at [phone] to:    Ask questions about your health    Make or cancel appointments    Discuss your medicines    Learn about your test results    Speak to your doctor            Additional Information About Your Visit        [employer] Information     [employer] gives you secure access to your electronic health record. If you see a primary care provider, you can also send messages to your care team and make appointments. If you have questions, please call your primary care clinic.  If you do not have a primary care provider, please call [phone] and they will assist you.      [facility] is an electronic gateway that provides easy, online access to your medical records. With [facility], you can request a clinic appointment, read your test results, renew a prescription or communicate with your care team.     To access your existing account, please contact your [facility] or call [phone] for assistance.        Care EveryWhere ID     This is your Care EveryWhere ID. This could be used by other organizations to access your [facility] medical records  [id number]        Your Vitals Were     Pulse Temperature Height Last Period Pulse Oximetry BMI (Body Mass Index)    95 97.9  F (36.6  C) (Oral) 1.584 m (5' 2.38\") (LMP Unknown) 94% 31.17 kg/m2       Blood Pressure from Last 3 Encounters:   11/05/18 125/80   08/15/18 133/55   07/24/18 122/71    Weight from Last 3 Encounters:   11/05/18 78.2 kg (172 lb 8 oz)   08/15/18 74.8 kg (165 lb)   06/05/18 74 kg (163 lb 3.2 oz)              We Performed the Following     HC CHEMODENERVATION 1 EXTREMITY, EA ADDL EXTREMITY, 1-4 MUSCLE     HC CHEMODENERVATION MUSCLE NECK UNILAT     HC CHEMODENERVATION ONE EXTREMITY, 1-4 MUSCLE     Needle EMG Guide w/Chemodenervation ([id number])        " Primary Care Provider Office Phone # Fax #    Vargas Ata Chaudhry [phone] [address] [facility] [address]        Equal Access to Services     RAJWINDER TEJEDA : Hadii aad ku haddhruvo Soomaali, waaxda luqadaha, qaybta kaalmada adesilvino, shakeel daykevan lorena. So [facility] [phone].    ATENCIÓN: Si habla español, tiene a sweet disposición servicios gratuitos de asistencia lingüística. Llame al [phone].    We comply with applicable federal civil rights laws and [place] laws. We do not discriminate on the basis of race, color, national origin, age, disability, sex, sexual orientation, or gender identity.            Thank you!     Thank you for choosing [facility]  for your care. Our goal is always to provide you with excellent care. Hearing back from our patients is one way we can continue to improve our services. Please take a few minutes to complete the written survey that you may receive in the mail after your visit with us. Thank you!             Your Updated Medication List - Protect others around you: Learn how to safely use, store and throw away your medicines at www.disposemymeds.org.          This list is accurate as of 11/5/18  4:14 PM.  Always use your most recent med list.                   Brand Name Dispense Instructions for use Diagnosis    * BOTOX IJ      Inject 150 Units into the muscle Lot # /[id number]  Exp: 10/2020        * BOTOX IJ      Inject 150 Units as directed once Lot # /C3 with Expiration Date:  11/2020        * BOTOX IJ      Inject 150 Units into the muscle once Lot # /C3 with Expiration Date: 03/2021        * BOTOX IJ      Inject 150 Units as directed once /C3 Exp 05/2021        mometasone 0.1 % cream    ELOCON    45 g    Apply sparingly to affected area twice daily as needed.  Do not apply to face.    Dermatitis       olopatadine HCl 0.2 % Soln    PATADAY    2.5 mL    Place 1 drop  into both eyes daily    Chronic seasonal allergic rhinitis, unspecified trigger       RESTASIS MULTIDOSE 0.05 % ophthalmic emulsion   Generic drug:  cycloSPORINE      Place 1 drop into both eyes 2 times daily        simvastatin 20 MG tablet    ZOCOR    90 tablet    Take 1 tablet (20 mg) by mouth At Bedtime    Hyperlipidemia LDL goal <160       traZODone 50 MG tablet    DESYREL    90 tablet    TAKE 3 TABLETS BY MOUTH AT BEDTIME    Persistent insomnia       * Notice:  This list has 4 medication(s) that are the same as other medications prescribed for you. Read the directions carefully, and ask your doctor or other care provider to review them with you.
